# Patient Record
Sex: FEMALE | Race: WHITE | NOT HISPANIC OR LATINO | Employment: FULL TIME | URBAN - METROPOLITAN AREA
[De-identification: names, ages, dates, MRNs, and addresses within clinical notes are randomized per-mention and may not be internally consistent; named-entity substitution may affect disease eponyms.]

---

## 2020-03-06 ENCOUNTER — TELEPHONE (OUTPATIENT)
Dept: SURGERY | Facility: CLINIC | Age: 40
End: 2020-03-06

## 2020-03-06 NOTE — TELEPHONE ENCOUNTER
"New Pt with   3-27-20 arrive 7:30    Pre screened for possible Breast Mri  Good on all questions.  Ht: 5'3\"        Mailed pw    Pt v/u  Salud  "

## 2020-03-09 ENCOUNTER — TELEPHONE (OUTPATIENT)
Dept: SURGERY | Facility: CLINIC | Age: 40
End: 2020-03-09

## 2020-03-09 DIAGNOSIS — C50.212 MALIGNANT NEOPLASM OF UPPER-INNER QUADRANT OF LEFT FEMALE BREAST, UNSPECIFIED ESTROGEN RECEPTOR STATUS (HCC): Primary | ICD-10-CM

## 2020-03-09 NOTE — TELEPHONE ENCOUNTER
Scheduled Bilateral Breast Mri w wo contrast [facility] 3-17-20 arrive 6:15am    Spoke to pt she v/u    Salud

## 2020-03-13 ENCOUNTER — LAB REQUISITION (OUTPATIENT)
Dept: LAB | Facility: HOSPITAL | Age: 40
End: 2020-03-13

## 2020-03-13 DIAGNOSIS — Z00.00 ENCOUNTER FOR GENERAL ADULT MEDICAL EXAMINATION WITHOUT ABNORMAL FINDINGS: ICD-10-CM

## 2020-03-13 LAB
CYTO UR: NORMAL
LAB AP CASE REPORT: NORMAL
LAB AP DIAGNOSIS COMMENT: NORMAL
LAB AP SPECIAL STAINS: NORMAL
PATH REPORT.FINAL DX SPEC: NORMAL
PATH REPORT.GROSS SPEC: NORMAL

## 2020-03-13 PROCEDURE — 88360 TUMOR IMMUNOHISTOCHEM/MANUAL: CPT | Performed by: SURGERY

## 2020-03-17 ENCOUNTER — APPOINTMENT (OUTPATIENT)
Dept: MRI IMAGING | Facility: HOSPITAL | Age: 40
End: 2020-03-17

## 2020-03-18 ENCOUNTER — HOSPITAL ENCOUNTER (OUTPATIENT)
Dept: MRI IMAGING | Facility: HOSPITAL | Age: 40
Discharge: HOME | End: 2020-03-18
Admitting: SURGERY

## 2020-03-18 DIAGNOSIS — C50.212 MALIGNANT NEOPLASM OF UPPER-INNER QUADRANT OF LEFT FEMALE BREAST, UNSPECIFIED ESTROGEN RECEPTOR STATUS (HCC): ICD-10-CM

## 2020-03-18 PROCEDURE — 0 GADOBENATE DIMEGLUMINE 529 MG/ML SOLUTION: Performed by: SURGERY

## 2020-03-18 PROCEDURE — 77049 MRI BREAST C-+ W/CAD BI: CPT

## 2020-03-18 PROCEDURE — A9577 INJ MULTIHANCE: HCPCS | Performed by: SURGERY

## 2020-03-18 RX ADMIN — GADOBENATE DIMEGLUMINE 12 ML: 529 INJECTION, SOLUTION INTRAVENOUS at 19:11

## 2020-03-23 ENCOUNTER — TELEPHONE (OUTPATIENT)
Dept: SURGERY | Facility: CLINIC | Age: 40
End: 2020-03-23

## 2020-03-23 ENCOUNTER — OFFICE VISIT (OUTPATIENT)
Dept: SURGERY | Facility: CLINIC | Age: 40
End: 2020-03-23

## 2020-03-23 VITALS
TEMPERATURE: 98.6 F | BODY MASS INDEX: 31.36 KG/M2 | HEART RATE: 69 BPM | DIASTOLIC BLOOD PRESSURE: 72 MMHG | OXYGEN SATURATION: 97 % | SYSTOLIC BLOOD PRESSURE: 118 MMHG | HEIGHT: 63 IN | WEIGHT: 177 LBS

## 2020-03-23 DIAGNOSIS — Z80.3 FH: BREAST CANCER: ICD-10-CM

## 2020-03-23 DIAGNOSIS — N63.0 LUMP OR MASS IN BREAST: ICD-10-CM

## 2020-03-23 DIAGNOSIS — Z87.440 HISTORY OF FREQUENT URINARY TRACT INFECTIONS: ICD-10-CM

## 2020-03-23 DIAGNOSIS — C50.212 MALIGNANT NEOPLASM OF UPPER-INNER QUADRANT OF LEFT BREAST IN FEMALE, ESTROGEN RECEPTOR POSITIVE (HCC): Primary | ICD-10-CM

## 2020-03-23 DIAGNOSIS — Z17.0 MALIGNANT NEOPLASM OF UPPER-INNER QUADRANT OF LEFT BREAST IN FEMALE, ESTROGEN RECEPTOR POSITIVE (HCC): Primary | ICD-10-CM

## 2020-03-23 PROCEDURE — 99244 OFF/OP CNSLTJ NEW/EST MOD 40: CPT | Performed by: SURGERY

## 2020-03-23 RX ORDER — FERROUS SULFATE 325(65) MG
1 TABLET ORAL DAILY
COMMUNITY
Start: 2020-03-12 | End: 2020-06-01

## 2020-03-23 RX ORDER — LANOLIN ALCOHOL/MO/W.PET/CERES
1000 CREAM (GRAM) TOPICAL DAILY
COMMUNITY
End: 2020-06-01

## 2020-03-23 RX ORDER — SERTRALINE HYDROCHLORIDE 100 MG/1
200 TABLET, FILM COATED ORAL EVERY EVENING
COMMUNITY
Start: 2020-03-16 | End: 2021-06-28

## 2020-03-23 RX ORDER — DIAZEPAM 5 MG/1
TABLET ORAL
COMMUNITY
Start: 2020-03-10 | End: 2020-04-01

## 2020-03-23 NOTE — PROGRESS NOTES
Chief Complaint: Stacy Grewal is a 40 y.o. female who was seen in consultation at the request of Brenda Sheikh MD  for newly diagnosed breast cancer    History of Present Illness:  Patient presents with newly diagnosed breast cancer, LEFT breast.  She noted a left breast mass in January.  She does not feel that this is gotten any larger.  She noticed no associated pain with this.  No associated skin changes.  She noticed it first when looking in the mirror at the gym.      She noted no other new masses, skin changes, nipple discharge, nipple changes prior to her most recent imaging.  Her most recent imaging includes the followin2020  [facility]   Radiology  Stacy Grewal  SONOGRAM LEFT LTD  MAMM DX BILAT  Clinical history: Left breast mass, upper outer quadrant.   Comparison: None, baseline exam.   Breasts are composed of scattered fibroglandular tissue. 3 cm high density speculated mass 11-12:00 left breast middle one third. Associated pleomorphic microcalcifications and prominent architectural distortion. Corresponds to the palpable abnormality. Right breast is unremarkable.   Left Breast US: 3.2 x 2.3 x 1.9 cm mass 11-12:00. Recommend ultrasound-guided core needle biopsy.  BIRADS: 5      She had a biopsy on the following day that showed:     2020  Stella Grewal  US/BX BREAST LEFT  MAMM EDX UNILATERAL LEFT  Left breast. 4 core needle biopsy specimen 9 gauge device. A marker clip was then deployed within the biopsy bed.  Diagnostic Left Mammo: Postprocedure diagnostic left craniocaudal view confirms marker clip placement within the biopsied mass.    2020  [facility]   Radiology  Stacy Grewal  Solid architecture, high nuclear grade, and infrequent mitotic figures.  Invasive ductal carcinoma, grade 2.    2020  [facility]   Pathology  Stacy Grewal  OUTSIDE PATH CONSULT  Left, Core Biopsy:  A. Invasive mammary carcinoma, no special type,  grade II/III (tubules =3, nuclei =2, mitoses =2) 14 mm.  B. ER - 60%; OH - 10%; HER2 (Score 1+)  C. Ki-67 - 20%  D. No lymphvascular invasion.  E. No in situ component identified.        We then arranged for a breast MRI:    03/18/2020  [facility]  Radiololgy  Stacy Grewal  BILATERAL BREAST MRI  Middle third left breast 11:00 upper inner quadrant there is an irregular mass 4.4 cm in anterior-posterior, 3.7 cm in the superior-inferior 3.1 cm in the medial-lateral. A metallic clip is located along the anterolateral margin of the mass.  No evidence for left axillary or internal mammary chain adenopathy.  No findings suspicious for malignancy in the right breast.      She has not had a breast biopsy in the past.  She has her uterus and ovaries, is premenopausal, and takes no hormones.  Her family history includes the following: She has 1 daughter, 5 sisters, 4 maternal aunts, one paternal aunt, she has a paternal cousin who had breast cancer in her mid 30s, she has a paternal great aunt who had breast cancer.    She is here for evaluation.    Review of Systems:  Review of Systems   Constitutional: Positive for diaphoresis, fatigue and unexpected weight change. Negative for appetite change, chills and fever.   HENT:   Negative for hearing loss, lump/mass, mouth sores, nosebleeds, sore throat, tinnitus, trouble swallowing and voice change.    Eyes: Negative for eye problems and icterus.   Respiratory: Negative for chest tightness, cough, hemoptysis, shortness of breath and wheezing.    Cardiovascular: Negative for chest pain, leg swelling and palpitations.   Gastrointestinal: Negative for abdominal distention, abdominal pain, blood in stool, constipation, diarrhea, nausea, rectal pain and vomiting.   Endocrine: Positive for hot flashes.   Genitourinary: Positive for frequency, nocturia and vaginal discharge. Negative for bladder incontinence, difficulty urinating, dyspareunia, dysuria, hematuria, menstrual  problem, pelvic pain and vaginal bleeding.    Musculoskeletal: Positive for back pain and myalgias. Negative for arthralgias, flank pain, gait problem, neck pain and neck stiffness.   Skin: Negative for itching, rash and wound.   Neurological: Negative for dizziness, extremity weakness, gait problem, headaches, light-headedness, numbness, seizures and speech difficulty.   Hematological: Negative for adenopathy. Does not bruise/bleed easily.   Psychiatric/Behavioral: Positive for decreased concentration, depression and sleep disturbance. Negative for confusion and suicidal ideas. The patient is nervous/anxious.         Past Medical and Surgical History:  Breast Biopsy History:  Patient had not had a breast biopsy prior to her cancer diagnosis.  Breast Cancer HIstory:  Patient does not have a past medical history of breast cancer.  Breast Operations, and year:  None   Obstetric/Gynecologic History:  Age menstrual periods began: 12  Patient is premenopausal, first day of last period: approx 3/23/2020  Number of pregnancies: 2  Number of live births: 2  Number of abortions or miscarriages: 0  Age of delivery of first child: 22  Patient did not breast feed.  Length of time taking birth control pills: 6-8 yrs   Patient has never taken hormone replacement  Patient has both ovaries and uterus.     Past Surgical History:   Procedure Laterality Date   • APPENDECTOMY     • BREAST BIOPSY       Past Medical History:   Diagnosis Date   • Anemia    • Anxiety    • Depression    • History of UTI        Prior Hospitalizations, other than for surgery or childbirth, and year:  None     Social History     Socioeconomic History   • Marital status:      Spouse name: Not on file   • Number of children: Not on file   • Years of education: Not on file   • Highest education level: Not on file   Tobacco Use   • Smoking status: Never Smoker   • Smokeless tobacco: Never Used   Substance and Sexual Activity   • Alcohol use: Yes      "Comment: RARE   • Drug use: Never     Patient is .  Patient is employed full time with the following occupation:    Patient drinks 1-2 servings of caffeine per day.    Family History:  Family History   Problem Relation Age of Onset   • Breast cancer Paternal Aunt         GREAT PATERNAL AUNT    • Breast cancer Cousin 35        PATERNAL COUSIN        Vital Signs:  /72   Pulse 69   Temp 98.6 °F (37 °C) (Temporal)   Ht 160 cm (63\")   Wt 80.3 kg (177 lb)   LMP 03/23/2020 (Approximate)   SpO2 97%   Breastfeeding No   BMI 31.35 kg/m²      Medications:    Current Outpatient Medications:   •  ferrous sulfate 325 (65 FE) MG tablet, Take 1 tablet by mouth Daily., Disp: , Rfl:   •  sertraline (ZOLOFT) 100 MG tablet, Take 200 mg by mouth Daily., Disp: , Rfl:   •  vitamin B-12 (CYANOCOBALAMIN) 1000 MCG tablet, Take 1,000 mcg by mouth Daily., Disp: , Rfl:   •  diazePAM (VALIUM) 5 MG tablet, TAKE 1 TABLAET 1 HOUR BEFORE APPOINTMENT AND MAY REPEAT ONCE, Disp: , Rfl:      Allergies:  No Known Allergies    Physical Examination:  /72   Pulse 69   Temp 98.6 °F (37 °C) (Temporal)   Ht 160 cm (63\")   Wt 80.3 kg (177 lb)   LMP 03/23/2020 (Approximate)   SpO2 97%   Breastfeeding No   BMI 31.35 kg/m²   General Appearance:  Patient is in no distress.  She is well kept and has an obese build.   Psychiatric:  Patient with appropriate mood and affect. Alert and oriented to self, time, and place.    Breast, RIGHT:  large sized,44DD,  symmetric with the contralateral side.  Breast skin is without erythema, edema, rashes.  There are no visible abnormalities upon inspection during the arm-raising maneuver or with hands on hips in the sitting position. There is no nipple retraction, discharge or nipple/areolar skin changes.There are no masses palpable in the sitting or supine positions.    Breast, LEFT:  large sized, 44DD, symmetric with the contralateral side.  Breast skin is without erythema, edema, " rashes.  There are no visible abnormalities upon inspection during the arm-raising maneuver or with hands on hips in the sitting position. There is no nipple retraction, discharge or nipple/areolar skin changes.there is a 4 x 3.5 cm mass palpable at the left breast 1130, 9.5 cm from the nipple location of known malignancy.  There are no overlying skin changes.  It is nontender.  There are no other masses palpable in the sitting or supine positions.    Lymphatic:  There is no axillary, cervical, infraclavicular, or supraclavicular adenopathy bilaterally.  Eyes:  Pupils are round and reactive to light.  Cardiovascular:  Heart rate and rhythm are regular.  Respiratory:  Lungs are clear bilaterally with no crackles or wheezes in any lung field.  Gastrointestinal:  Abdomen is soft, nondistended, and nontender.     Musculoskeletal:  Good strength in all 4 extremities.   There is good range of motion in both shoulders.    Skin:  No new skin lesions or rashes on the skin excluding the breast (see breast exam above).        Imagin2020  [facility]   Radiology  [facility]  SONOGRAM LEFT LTD  MAMM DX BILAT  Clinical history: Left breast mass, upper outer quadrant.   Comparison: None, baseline exam.   Breasts are composed of scattered fibroglandular tissue. 3 cm high density speculated mass 11-12:00 left breast middle one third. Associated pleomorphic microcalcifications and prominent architectural distortion. Corresponds to the palpable abnormality. Right breast is unremarkable.   Left Breast US: 3.2 x 2.3 x 1.9 cm mass 11-12:00. Recommend ultrasound-guided core needle biopsy.  BIRADS: 5    2020  [facility]  Radiololgy  Stacy Hobgood  BILATERAL BREAST MRI  Middle third left breast 11:00 upper inner quadrant there is an irregular mass 4.4 cm in anterior-posterior, 3.7 cm in the superior-inferior 3.1 cm in the medial-lateral. A metallic clip is located along the anterolateral margin of the  mass.  No evidence for left axillary or internal mammary chain adenopathy.  No findings suspicious for malignancy in the right breast.    Pathology:    03/04/2020  [facility]   Radiology  Stacy Grewal  US/BX BREAST LEFT  MAMM EDX UNILATERAL LEFT  Left breast. 4 core needle biopsy specimen 9 gauge device. A marker clip was then deployed within the biopsy bed.  Diagnostic Left Mammo: Postprocedure diagnostic left craniocaudal view confirms marker clip placement within the biopsied mass.    03/04/2020  [facility]   Radiology  Stacy Grewal  Solid architecture, high nuclear grade, and infrequent mitotic figures.  Invasive ductal carcinoma, grade 2.    03/13/2020  [facility]   Pathology  Stacy Grewal  OUTSIDE PATH CONSULT  Left, Core Biopsy:  F. Invasive mammary carcinoma, no special type, grade II/III (tubules =3, nuclei =2, mitoses =2) 14 mm.  G. ER - 60%; NC - 10%; HER2 (Score 1+)  H. Ki-67 - 20%  I. No lymphvascular invasion.  J. No in situ component identified.    Procedures:      Assessment:   Diagnosis Plan   1. Malignant neoplasm of upper-inner quadrant of left breast in female, estrogen receptor positive (CMS/HCC)     2. Lump or mass in breast     3. FH: breast cancer     4. History of frequent urinary tract infections     1-2  Left breast 1130, 9.5 cm from the nipple, 4 cm on exam, 4.4 cm on MRI, 3.2 cm on ultrasound.  Invasive mammary carcinoma, no special type, intermediate grade, 3, 2, 2, 1.4 cm largest in a core, estrogen 60, progesterone 10, HER-2/mary 1+, Ki-67 is 20%.  No lymphovascular invasion, no duct carcinoma site 2,  Clinical stage T2N0 stage IIa.    3-  Paternal cousin age 35, paternal great aunt uncertain age    4-  Postcoital UTIs    Plan:  Stacy is here with her cousin today.    We reviewed her history, exam, imaging, pathology, and treatment options together today. We reviewed the differences in systemic therpay versus locoregional therapy. We discussed the options of  neoadjuvant versus adjuvant therapy. We discussed that for larger tumors, node positive tumors, or tumors that are felt to communly be chemosensitive, that neoadjuvant therapy can offer the following benefits: potential tumor reduction that may allow for breast conservation, ability to see in vivo response to chemotherapy, potential in 30% of women to neutralize small volume wendy disease that may allow avoidance of complete axillary dissection.      Specifically for her, the location in the upper inner quadrant and the sheer size, she would benefit from neoadjuvant therapy just to shrink the tumor.  The possibility of neutralizing microscopic wendy disease would also be of benefit.  I spoke with Dr. Gregory regarding her case today.    She is interested in neoadjuvant therapy.    Since this is hormone fed and not fed by HER-2/mary, we will plan to send an Oncotype off, to help her decide between a shorter or longer duration of neoadjuvant therapy.  She will let me know whether Stacy needs a port placed.  Stacy and I discussed her having a left breast ultrasound here at [facility] prior to seeing me in 8 weeks.  We also discussed with her family history and young age at diagnosis that I would recommend we send off a genetic panel today.  Breast and gynecologic panel with [facility].    We briefly discussed breast conservation versus total mastectomy in general terms. After her followup MRI, we will discuss the surgical options as well as radiation.     The plan would be eventually for a left breast image guided lumpectomy, left axillary sentinel lymph node biopsy, possible axillary lymph node dissection and oncoplastic closure bilaterally.    At her next visit, we will schedule her to see plastics and her FU MRI.    -- addendum- I received a call from Dr Didi Chew, partner of Compa Leija, at [facility], He plans for ddAC-T. He would like a port.    We will plan for a RIGHT port  placement.    She and I will discuss risks of bleeding, infection, pneumothorax, thrombus, malfunction prior to her case early next week.    He said not to cancel the oncotype.        Sunni Barber MD        We have spent 60 minutes in visit today, 45 in face to face .      Next Appointment:  Return in about 8 weeks (around 5/18/2020) for after imaging.      EMR Dragon/transcription disclaimer:    Much of this encounter note is an electronic transcription/translocation of spoken language to printed text.  The electronic translation of spoken language may permit erroneous, or at times, nonsensical words or phrases to be inadvertently transcribed.  Although I have reviewed the note from such areas, some may still exist.

## 2020-03-27 ENCOUNTER — TELEPHONE (OUTPATIENT)
Dept: SURGERY | Facility: CLINIC | Age: 40
End: 2020-03-27

## 2020-03-27 ENCOUNTER — TRANSCRIBE ORDERS (OUTPATIENT)
Dept: SURGERY | Facility: CLINIC | Age: 40
End: 2020-03-27

## 2020-03-27 DIAGNOSIS — C50.212 MALIGNANT NEOPLASM OF UPPER-INNER QUADRANT OF LEFT FEMALE BREAST, UNSPECIFIED ESTROGEN RECEPTOR STATUS (HCC): Primary | ICD-10-CM

## 2020-03-27 NOTE — TELEPHONE ENCOUNTER
Scheduled Left Breast U/S at [facility] 5-18-20 @ 11:30  Return to see Dr ORTIZ same day at 1:30          Salud

## 2020-03-30 ENCOUNTER — TELEPHONE (OUTPATIENT)
Dept: SURGERY | Facility: CLINIC | Age: 40
End: 2020-03-30

## 2020-03-30 ENCOUNTER — TELEPHONE (OUTPATIENT)
Dept: OTHER | Facility: HOSPITAL | Age: 40
End: 2020-03-30

## 2020-03-30 NOTE — TELEPHONE ENCOUNTER
Referral received from Dr. Barber's office. I called Ms. Grewal and introduced myself and navigational services. She states the plan is to have chemotherapy first and she will do that at [facility]. She has a good understanding of her pathology and treatment options and  is comfortable with her plan of care.     We discussed her support system and she stated her support is ok, but she has a therapist she talks to twice a month. We discussed that we have counseling services available through our [facility]. She declined the need for that at present.    We discussed integrative therapies and other services at the [facility]. She verbalized interest in receiving a navigation folder outlining services. I verified her mailing address and will send out a navigation folder with the following information:     [facility] Support [facility],  Sharing Our Stories Breast Cancer Support Group, Cancer and Restorative Exercise (CARE), [employer] Exercise program, Together for Breast Cancer Survival,  For Women Facing Breast Cancer, Road to Recovery, Bio[employer]ped[facility], [facility], Massage Therapy, Reiki Therapy, [employer] [place], Cancer Nutrition, and Survivorship Clinic.     She verbalized appreciation for navigational services and she has my contact information and will call with any questions that arise.

## 2020-03-31 ENCOUNTER — PREP FOR SURGERY (OUTPATIENT)
Dept: OTHER | Facility: HOSPITAL | Age: 40
End: 2020-03-31

## 2020-03-31 ENCOUNTER — TELEPHONE (OUTPATIENT)
Dept: SURGERY | Facility: CLINIC | Age: 40
End: 2020-03-31

## 2020-03-31 DIAGNOSIS — C50.212 MALIGNANT NEOPLASM OF UPPER-INNER QUADRANT OF LEFT FEMALE BREAST, UNSPECIFIED ESTROGEN RECEPTOR STATUS (HCC): Primary | ICD-10-CM

## 2020-03-31 DIAGNOSIS — Z17.0 CARCINOMA OF UPPER-INNER QUADRANT OF LEFT BREAST IN FEMALE, ESTROGEN RECEPTOR POSITIVE (HCC): Primary | ICD-10-CM

## 2020-03-31 DIAGNOSIS — C50.212 CARCINOMA OF UPPER-INNER QUADRANT OF LEFT BREAST IN FEMALE, ESTROGEN RECEPTOR POSITIVE (HCC): Primary | ICD-10-CM

## 2020-03-31 RX ORDER — ONDANSETRON 4 MG/1
4 TABLET, FILM COATED ORAL EVERY 8 HOURS PRN
Qty: 10 TABLET | Refills: 1 | Status: SHIPPED | OUTPATIENT
Start: 2020-03-31 | End: 2020-04-01

## 2020-03-31 RX ORDER — SODIUM CHLORIDE, SODIUM LACTATE, POTASSIUM CHLORIDE, CALCIUM CHLORIDE 600; 310; 30; 20 MG/100ML; MG/100ML; MG/100ML; MG/100ML
100 INJECTION, SOLUTION INTRAVENOUS CONTINUOUS
Status: CANCELLED | OUTPATIENT
Start: 2020-04-07

## 2020-03-31 RX ORDER — CEFAZOLIN SODIUM 2 G/100ML
2 INJECTION, SOLUTION INTRAVENOUS ONCE
Status: CANCELLED | OUTPATIENT
Start: 2020-04-07 | End: 2020-03-31

## 2020-03-31 RX ORDER — POLYETHYLENE GLYCOL 3350 17 G/17G
17 POWDER, FOR SOLUTION ORAL DAILY
Qty: 1 EACH | Refills: 0 | Status: SHIPPED | OUTPATIENT
Start: 2020-03-31 | End: 2020-04-01

## 2020-03-31 RX ORDER — HYDROCODONE BITARTRATE AND ACETAMINOPHEN 5; 325 MG/1; MG/1
TABLET ORAL
Qty: 15 TABLET | Refills: 0 | Status: SHIPPED | OUTPATIENT
Start: 2020-03-31 | End: 2020-04-01

## 2020-03-31 RX ORDER — CALCIUM POLYCARBOPHIL 625 MG 625 MG/1
625 TABLET ORAL DAILY
Qty: 30 TABLET | Refills: 0 | Status: SHIPPED | OUTPATIENT
Start: 2020-03-31 | End: 2020-04-01

## 2020-04-01 ENCOUNTER — APPOINTMENT (OUTPATIENT)
Dept: PREADMISSION TESTING | Facility: HOSPITAL | Age: 40
End: 2020-04-01

## 2020-04-01 ENCOUNTER — TELEPHONE (OUTPATIENT)
Dept: SURGERY | Facility: CLINIC | Age: 40
End: 2020-04-01

## 2020-04-01 VITALS
HEIGHT: 63 IN | TEMPERATURE: 98.1 F | WEIGHT: 172 LBS | HEART RATE: 82 BPM | DIASTOLIC BLOOD PRESSURE: 71 MMHG | BODY MASS INDEX: 30.48 KG/M2 | OXYGEN SATURATION: 98 % | SYSTOLIC BLOOD PRESSURE: 106 MMHG | RESPIRATION RATE: 16 BRPM

## 2020-04-01 LAB
ANION GAP SERPL CALCULATED.3IONS-SCNC: 14.2 MMOL/L (ref 5–15)
BUN BLD-MCNC: 6 MG/DL (ref 6–20)
BUN/CREAT SERPL: 8.3 (ref 7–25)
CALCIUM SPEC-SCNC: 9.4 MG/DL (ref 8.6–10.5)
CHLORIDE SERPL-SCNC: 98 MMOL/L (ref 98–107)
CO2 SERPL-SCNC: 25.8 MMOL/L (ref 22–29)
CREAT BLD-MCNC: 0.72 MG/DL (ref 0.57–1)
DEPRECATED RDW RBC AUTO: 43.9 FL (ref 37–54)
ERYTHROCYTE [DISTWIDTH] IN BLOOD BY AUTOMATED COUNT: 14.2 % (ref 12.3–15.4)
GFR SERPL CREATININE-BSD FRML MDRD: 91 ML/MIN/1.73
GLUCOSE BLD-MCNC: 83 MG/DL (ref 65–99)
HCG SERPL QL: NEGATIVE
HCT VFR BLD AUTO: 40.1 % (ref 34–46.6)
HGB BLD-MCNC: 13 G/DL (ref 12–15.9)
MCH RBC QN AUTO: 27.3 PG (ref 26.6–33)
MCHC RBC AUTO-ENTMCNC: 32.4 G/DL (ref 31.5–35.7)
MCV RBC AUTO: 84.1 FL (ref 79–97)
PLATELET # BLD AUTO: 248 10*3/MM3 (ref 140–450)
PMV BLD AUTO: 9.2 FL (ref 6–12)
POTASSIUM BLD-SCNC: 4 MMOL/L (ref 3.5–5.2)
RBC # BLD AUTO: 4.77 10*6/MM3 (ref 3.77–5.28)
SODIUM BLD-SCNC: 138 MMOL/L (ref 136–145)
WBC NRBC COR # BLD: 6.61 10*3/MM3 (ref 3.4–10.8)

## 2020-04-01 PROCEDURE — 36415 COLL VENOUS BLD VENIPUNCTURE: CPT

## 2020-04-01 PROCEDURE — 84703 CHORIONIC GONADOTROPIN ASSAY: CPT | Performed by: SURGERY

## 2020-04-01 PROCEDURE — 80048 BASIC METABOLIC PNL TOTAL CA: CPT | Performed by: SURGERY

## 2020-04-01 PROCEDURE — 85027 COMPLETE CBC AUTOMATED: CPT | Performed by: SURGERY

## 2020-04-01 RX ORDER — MELATONIN 10 MG
1 CAPSULE ORAL NIGHTLY
COMMUNITY
End: 2020-06-01

## 2020-04-01 NOTE — DISCHARGE INSTRUCTIONS
Take the following medications the morning of surgery:NONE    SURGERY 04/07 ARRIVAL 05:30    General Instructions:  • Do not eat solid food after midnight the night before surgery.  • You may drink clear liquids day of surgery but must stop at least one hour before your hospital arrival time.  • It is beneficial for you to have a clear drink that contains carbohydrates the day of surgery.  We suggest a 12 to 20 ounce bottle of Gatorade or Powerade for non-diabetic patients or a 12 to 20 ounce bottle of G2 or Powerade Zero for diabetic patients. (Pediatric patients, are not advised to drink a 12 to 20 ounce carbohydrate drink)    Clear liquids are liquids you can see through.  Nothing red in color.     Plain water                               Sports drinks  Sodas                                   Gelatin (Jell-O)  Fruit juices without pulp such as white grape juice and apple juice  Popsicles that contain no fruit or yogurt  Tea or coffee (no cream or milk added)  Gatorade / Powerade  G2 / Powerade Zero    • Infants may have breast milk up to four hours before surgery.  • Infants drinking formula may drink formula up to six hours before surgery.   • Patients who avoid smoking, chewing tobacco and alcohol for 4 weeks prior to surgery have a reduced risk of post-operative complications.  Quit smoking as many days before surgery as you can.  • Do not smoke, use chewing tobacco or drink alcohol the day of surgery.   • If applicable bring your C-PAP/ BI-PAP machine.  • Bring any papers given to you in the doctor’s office.  • Wear clean comfortable clothes.  • Do not wear contact lenses, false eyelashes or make-up.  Bring a case for your glasses.   • Bring crutches or walker if applicable.  • Remove all piercings.  Leave jewelry and any other valuables at home.  • Hair extensions with metal clips must be removed prior to surgery.  • The Pre-Admission Testing nurse will instruct you to bring medications if unable to obtain  an accurate list in Pre-Admission Testing.        If you were given a blood bank ID arm band remember to bring it with you the day of surgery.    Preventing a Surgical Site Infection:  • For 2 to 3 days before surgery, avoid shaving with a razor because the razor can irritate skin and make it easier to develop an infection.    • Any areas of open skin can increase the risk of a post-operative wound infection by allowing bacteria to enter and travel throughout the body.  Notify your surgeon if you have any skin wounds / rashes even if it is not near the expected surgical site.  The area will need assessed to determine if surgery should be delayed until it is healed.  • The night prior to surgery shower using a fresh bar of anti-bacterial soap (such as Dial) and clean washcloth.  Sleep in a clean bed with clean clothing.  Do not allow pets to sleep with you.  • Shower on the morning of surgery using a fresh bar of anti-bacterial soap (such as Dial) and clean washcloth.  Dry with a clean towel and dress in clean clothing.  • Ask your surgeon if you will be receiving antibiotics prior to surgery.  • Make sure you, your family, and all healthcare providers clean their hands with soap and water or an alcohol based hand  before caring for you or your wound.    Day of surgery:  Your arrival time is approximately two hours before your scheduled surgery time.  Upon arrival, a Pre-op nurse and Anesthesiologist will review your health history, obtain vital signs, and answer questions you may have.  The only belongings needed at this time will be a list of your home medications and if applicable your C-PAP/BI-PAP machine.  If you are staying overnight your family can leave the rest of your belongings in the car and bring them to your room later.  A Pre-op nurse will start an IV and you may receive medication in preparation for surgery, including something to help you relax.  Your family will be able to see you in the  Pre-op area.  Two visitors at a time will be allowed in the Pre-op room.  While you are in surgery your family should notify the waiting room  if they leave the waiting room area and provide a contact phone number.    Please be aware that surgery does come with discomfort.  We want to make every effort to control your discomfort so please discuss any uncontrolled symptoms with your nurse.   Your doctor will most likely have prescribed pain medications.      If you are going home after surgery you will receive individualized written care instructions before being discharged.  A responsible adult must drive you to and from the hospital on the day of your surgery and stay with you for 24 hours.    If you are staying overnight following surgery, you will be transported to your hospital room following the recovery period.  [facility] has all private rooms.    If you have any questions please call Pre-Admission Testing at [phone].  Deductibles and co-payments are collected on the day of service. Please be prepared to pay the required co-pay, deductible or deposit on the day of service as defined by your plan.

## 2020-04-07 ENCOUNTER — ANESTHESIA (OUTPATIENT)
Dept: PERIOP | Facility: HOSPITAL | Age: 40
End: 2020-04-07

## 2020-04-07 ENCOUNTER — ANESTHESIA EVENT (OUTPATIENT)
Dept: PERIOP | Facility: HOSPITAL | Age: 40
End: 2020-04-07

## 2020-04-07 ENCOUNTER — HOSPITAL ENCOUNTER (OUTPATIENT)
Facility: HOSPITAL | Age: 40
Setting detail: HOSPITAL OUTPATIENT SURGERY
Discharge: HOME | End: 2020-04-07
Attending: SURGERY | Admitting: SURGERY

## 2020-04-07 ENCOUNTER — APPOINTMENT (OUTPATIENT)
Dept: GENERAL RADIOLOGY | Facility: HOSPITAL | Age: 40
End: 2020-04-07

## 2020-04-07 VITALS
WEIGHT: 175.19 LBS | OXYGEN SATURATION: 93 % | HEIGHT: 63 IN | TEMPERATURE: 98.3 F | DIASTOLIC BLOOD PRESSURE: 61 MMHG | RESPIRATION RATE: 16 BRPM | HEART RATE: 68 BPM | SYSTOLIC BLOOD PRESSURE: 108 MMHG | BODY MASS INDEX: 31.04 KG/M2

## 2020-04-07 DIAGNOSIS — C50.212 CARCINOMA OF UPPER-INNER QUADRANT OF LEFT BREAST IN FEMALE, ESTROGEN RECEPTOR POSITIVE (HCC): ICD-10-CM

## 2020-04-07 DIAGNOSIS — Z17.0 CARCINOMA OF UPPER-INNER QUADRANT OF LEFT BREAST IN FEMALE, ESTROGEN RECEPTOR POSITIVE (HCC): ICD-10-CM

## 2020-04-07 PROCEDURE — 25010000002 SUCCINYLCHOLINE PER 20 MG: Performed by: NURSE ANESTHETIST, CERTIFIED REGISTERED

## 2020-04-07 PROCEDURE — 25010000002 FENTANYL CITRATE (PF) 100 MCG/2ML SOLUTION: Performed by: NURSE ANESTHETIST, CERTIFIED REGISTERED

## 2020-04-07 PROCEDURE — 25010000002 PROPOFOL 10 MG/ML EMULSION: Performed by: NURSE ANESTHETIST, CERTIFIED REGISTERED

## 2020-04-07 PROCEDURE — 25010000003 LIDOCAINE 1 % SOLUTION 20 ML VIAL: Performed by: SURGERY

## 2020-04-07 PROCEDURE — 77001 FLUOROGUIDE FOR VEIN DEVICE: CPT | Performed by: SURGERY

## 2020-04-07 PROCEDURE — C1788 PORT, INDWELLING, IMP: HCPCS | Performed by: SURGERY

## 2020-04-07 PROCEDURE — 36561 INSERT TUNNELED CV CATH: CPT | Performed by: SURGERY

## 2020-04-07 PROCEDURE — 25010000002 ONDANSETRON PER 1 MG: Performed by: NURSE ANESTHETIST, CERTIFIED REGISTERED

## 2020-04-07 PROCEDURE — 25010000002 DEXAMETHASONE PER 1 MG: Performed by: NURSE ANESTHETIST, CERTIFIED REGISTERED

## 2020-04-07 PROCEDURE — 25010000002 HEPARIN (PORCINE) PER 1000 UNITS: Performed by: SURGERY

## 2020-04-07 PROCEDURE — 25010000002 HYDROMORPHONE PER 4 MG: Performed by: NURSE ANESTHETIST, CERTIFIED REGISTERED

## 2020-04-07 PROCEDURE — 25010000003 CEFAZOLIN IN DEXTROSE 2-4 GM/100ML-% SOLUTION: Performed by: SURGERY

## 2020-04-07 PROCEDURE — 76000 FLUOROSCOPY <1 HR PHYS/QHP: CPT

## 2020-04-07 PROCEDURE — 25010000002 MIDAZOLAM PER 1 MG: Performed by: ANESTHESIOLOGY

## 2020-04-07 DEVICE — POWERPORT M.R.I. IMPLANTABLE PORT WITH ATTACHABLE 8F CHRONOFLEX OPEN-ENDED SINGLE-LUMEN VENOUS CATHETER INTERMEDIATE KIT (WITH SUTURE PLUGS)
Type: IMPLANTABLE DEVICE | Site: SUBCLAVIAN | Status: FUNCTIONAL
Brand: POWERPORT M.R.I., CHRONOFLEX

## 2020-04-07 RX ORDER — MIDAZOLAM HYDROCHLORIDE 1 MG/ML
1 INJECTION INTRAMUSCULAR; INTRAVENOUS
Status: DISCONTINUED | OUTPATIENT
Start: 2020-04-07 | End: 2020-04-07 | Stop reason: HOSPADM

## 2020-04-07 RX ORDER — PROPOFOL 10 MG/ML
VIAL (ML) INTRAVENOUS AS NEEDED
Status: DISCONTINUED | OUTPATIENT
Start: 2020-04-07 | End: 2020-04-07 | Stop reason: SURG

## 2020-04-07 RX ORDER — ONDANSETRON 2 MG/ML
4 INJECTION INTRAMUSCULAR; INTRAVENOUS ONCE AS NEEDED
Status: DISCONTINUED | OUTPATIENT
Start: 2020-04-07 | End: 2020-04-07 | Stop reason: HOSPADM

## 2020-04-07 RX ORDER — OXYCODONE AND ACETAMINOPHEN 7.5; 325 MG/1; MG/1
1 TABLET ORAL ONCE AS NEEDED
Status: DISCONTINUED | OUTPATIENT
Start: 2020-04-07 | End: 2020-04-07 | Stop reason: HOSPADM

## 2020-04-07 RX ORDER — DEXAMETHASONE SODIUM PHOSPHATE 10 MG/ML
INJECTION INTRAMUSCULAR; INTRAVENOUS AS NEEDED
Status: DISCONTINUED | OUTPATIENT
Start: 2020-04-07 | End: 2020-04-07 | Stop reason: SURG

## 2020-04-07 RX ORDER — LIDOCAINE HYDROCHLORIDE 20 MG/ML
INJECTION, SOLUTION INFILTRATION; PERINEURAL AS NEEDED
Status: DISCONTINUED | OUTPATIENT
Start: 2020-04-07 | End: 2020-04-07 | Stop reason: SURG

## 2020-04-07 RX ORDER — PROMETHAZINE HYDROCHLORIDE 25 MG/1
25 SUPPOSITORY RECTAL ONCE AS NEEDED
Status: DISCONTINUED | OUTPATIENT
Start: 2020-04-07 | End: 2020-04-07 | Stop reason: HOSPADM

## 2020-04-07 RX ORDER — HYDRALAZINE HYDROCHLORIDE 20 MG/ML
5 INJECTION INTRAMUSCULAR; INTRAVENOUS
Status: DISCONTINUED | OUTPATIENT
Start: 2020-04-07 | End: 2020-04-07 | Stop reason: HOSPADM

## 2020-04-07 RX ORDER — FAMOTIDINE 10 MG/ML
20 INJECTION, SOLUTION INTRAVENOUS ONCE
Status: COMPLETED | OUTPATIENT
Start: 2020-04-07 | End: 2020-04-07

## 2020-04-07 RX ORDER — PROMETHAZINE HYDROCHLORIDE 25 MG/ML
6.25 INJECTION, SOLUTION INTRAMUSCULAR; INTRAVENOUS
Status: DISCONTINUED | OUTPATIENT
Start: 2020-04-07 | End: 2020-04-07 | Stop reason: HOSPADM

## 2020-04-07 RX ORDER — ONDANSETRON 2 MG/ML
INJECTION INTRAMUSCULAR; INTRAVENOUS AS NEEDED
Status: DISCONTINUED | OUTPATIENT
Start: 2020-04-07 | End: 2020-04-07 | Stop reason: SURG

## 2020-04-07 RX ORDER — HYDROMORPHONE HYDROCHLORIDE 1 MG/ML
0.5 INJECTION, SOLUTION INTRAMUSCULAR; INTRAVENOUS; SUBCUTANEOUS
Status: DISCONTINUED | OUTPATIENT
Start: 2020-04-07 | End: 2020-04-07 | Stop reason: HOSPADM

## 2020-04-07 RX ORDER — SUCCINYLCHOLINE CHLORIDE 20 MG/ML
INJECTION INTRAMUSCULAR; INTRAVENOUS AS NEEDED
Status: DISCONTINUED | OUTPATIENT
Start: 2020-04-07 | End: 2020-04-07 | Stop reason: SURG

## 2020-04-07 RX ORDER — DIPHENHYDRAMINE HCL 25 MG
25 CAPSULE ORAL
Status: DISCONTINUED | OUTPATIENT
Start: 2020-04-07 | End: 2020-04-07 | Stop reason: HOSPADM

## 2020-04-07 RX ORDER — FENTANYL CITRATE 50 UG/ML
INJECTION, SOLUTION INTRAMUSCULAR; INTRAVENOUS AS NEEDED
Status: DISCONTINUED | OUTPATIENT
Start: 2020-04-07 | End: 2020-04-07 | Stop reason: SURG

## 2020-04-07 RX ORDER — CEFAZOLIN SODIUM 2 G/100ML
2 INJECTION, SOLUTION INTRAVENOUS ONCE
Status: COMPLETED | OUTPATIENT
Start: 2020-04-07 | End: 2020-04-07

## 2020-04-07 RX ORDER — EPHEDRINE SULFATE 50 MG/ML
INJECTION, SOLUTION INTRAVENOUS AS NEEDED
Status: DISCONTINUED | OUTPATIENT
Start: 2020-04-07 | End: 2020-04-07 | Stop reason: SURG

## 2020-04-07 RX ORDER — LIDOCAINE HYDROCHLORIDE 10 MG/ML
0.5 INJECTION, SOLUTION EPIDURAL; INFILTRATION; INTRACAUDAL; PERINEURAL ONCE AS NEEDED
Status: DISCONTINUED | OUTPATIENT
Start: 2020-04-07 | End: 2020-04-07 | Stop reason: HOSPADM

## 2020-04-07 RX ORDER — PROMETHAZINE HYDROCHLORIDE 25 MG/1
25 TABLET ORAL ONCE AS NEEDED
Status: DISCONTINUED | OUTPATIENT
Start: 2020-04-07 | End: 2020-04-07 | Stop reason: HOSPADM

## 2020-04-07 RX ORDER — FLUMAZENIL 0.1 MG/ML
0.2 INJECTION INTRAVENOUS AS NEEDED
Status: DISCONTINUED | OUTPATIENT
Start: 2020-04-07 | End: 2020-04-07 | Stop reason: HOSPADM

## 2020-04-07 RX ORDER — DIPHENHYDRAMINE HYDROCHLORIDE 50 MG/ML
12.5 INJECTION INTRAMUSCULAR; INTRAVENOUS
Status: DISCONTINUED | OUTPATIENT
Start: 2020-04-07 | End: 2020-04-07 | Stop reason: HOSPADM

## 2020-04-07 RX ORDER — LABETALOL HYDROCHLORIDE 5 MG/ML
5 INJECTION, SOLUTION INTRAVENOUS
Status: DISCONTINUED | OUTPATIENT
Start: 2020-04-07 | End: 2020-04-07 | Stop reason: HOSPADM

## 2020-04-07 RX ORDER — ACETAMINOPHEN 325 MG/1
650 TABLET ORAL ONCE AS NEEDED
Status: DISCONTINUED | OUTPATIENT
Start: 2020-04-07 | End: 2020-04-07 | Stop reason: HOSPADM

## 2020-04-07 RX ORDER — PROMETHAZINE HYDROCHLORIDE 25 MG/ML
12.5 INJECTION, SOLUTION INTRAMUSCULAR; INTRAVENOUS ONCE AS NEEDED
Status: DISCONTINUED | OUTPATIENT
Start: 2020-04-07 | End: 2020-04-07 | Stop reason: HOSPADM

## 2020-04-07 RX ORDER — NALOXONE HCL 0.4 MG/ML
0.2 VIAL (ML) INJECTION AS NEEDED
Status: DISCONTINUED | OUTPATIENT
Start: 2020-04-07 | End: 2020-04-07 | Stop reason: HOSPADM

## 2020-04-07 RX ORDER — SODIUM CHLORIDE 0.9 % (FLUSH) 0.9 %
3-10 SYRINGE (ML) INJECTION AS NEEDED
Status: DISCONTINUED | OUTPATIENT
Start: 2020-04-07 | End: 2020-04-07 | Stop reason: HOSPADM

## 2020-04-07 RX ORDER — EPHEDRINE SULFATE 50 MG/ML
5 INJECTION, SOLUTION INTRAVENOUS ONCE AS NEEDED
Status: DISCONTINUED | OUTPATIENT
Start: 2020-04-07 | End: 2020-04-07 | Stop reason: HOSPADM

## 2020-04-07 RX ORDER — HYDROCODONE BITARTRATE AND ACETAMINOPHEN 7.5; 325 MG/1; MG/1
1 TABLET ORAL ONCE AS NEEDED
Status: COMPLETED | OUTPATIENT
Start: 2020-04-07 | End: 2020-04-07

## 2020-04-07 RX ORDER — MIDAZOLAM HYDROCHLORIDE 1 MG/ML
2 INJECTION INTRAMUSCULAR; INTRAVENOUS
Status: DISCONTINUED | OUTPATIENT
Start: 2020-04-07 | End: 2020-04-07 | Stop reason: HOSPADM

## 2020-04-07 RX ORDER — ROCURONIUM BROMIDE 10 MG/ML
INJECTION, SOLUTION INTRAVENOUS AS NEEDED
Status: DISCONTINUED | OUTPATIENT
Start: 2020-04-07 | End: 2020-04-07 | Stop reason: SURG

## 2020-04-07 RX ORDER — FENTANYL CITRATE 50 UG/ML
50 INJECTION, SOLUTION INTRAMUSCULAR; INTRAVENOUS
Status: DISCONTINUED | OUTPATIENT
Start: 2020-04-07 | End: 2020-04-07 | Stop reason: HOSPADM

## 2020-04-07 RX ORDER — SODIUM CHLORIDE, SODIUM LACTATE, POTASSIUM CHLORIDE, CALCIUM CHLORIDE 600; 310; 30; 20 MG/100ML; MG/100ML; MG/100ML; MG/100ML
9 INJECTION, SOLUTION INTRAVENOUS CONTINUOUS
Status: DISCONTINUED | OUTPATIENT
Start: 2020-04-07 | End: 2020-04-07 | Stop reason: HOSPADM

## 2020-04-07 RX ORDER — SODIUM CHLORIDE 0.9 % (FLUSH) 0.9 %
3 SYRINGE (ML) INJECTION EVERY 12 HOURS SCHEDULED
Status: DISCONTINUED | OUTPATIENT
Start: 2020-04-07 | End: 2020-04-07 | Stop reason: HOSPADM

## 2020-04-07 RX ORDER — SODIUM CHLORIDE, SODIUM LACTATE, POTASSIUM CHLORIDE, CALCIUM CHLORIDE 600; 310; 30; 20 MG/100ML; MG/100ML; MG/100ML; MG/100ML
100 INJECTION, SOLUTION INTRAVENOUS CONTINUOUS
Status: DISCONTINUED | OUTPATIENT
Start: 2020-04-07 | End: 2020-04-07 | Stop reason: HOSPADM

## 2020-04-07 RX ADMIN — FENTANYL CITRATE 50 MCG: 50 INJECTION INTRAMUSCULAR; INTRAVENOUS at 08:11

## 2020-04-07 RX ADMIN — EPHEDRINE SULFATE 10 MG: 50 INJECTION INTRAVENOUS at 08:23

## 2020-04-07 RX ADMIN — SODIUM CHLORIDE, POTASSIUM CHLORIDE, SODIUM LACTATE AND CALCIUM CHLORIDE: 600; 310; 30; 20 INJECTION, SOLUTION INTRAVENOUS at 07:34

## 2020-04-07 RX ADMIN — FENTANYL CITRATE 50 MCG: 0.05 INJECTION, SOLUTION INTRAMUSCULAR; INTRAVENOUS at 09:39

## 2020-04-07 RX ADMIN — ROCURONIUM BROMIDE 5 MG: 10 INJECTION, SOLUTION INTRAVENOUS at 07:42

## 2020-04-07 RX ADMIN — DEXAMETHASONE SODIUM PHOSPHATE 10 MG: 10 INJECTION INTRAMUSCULAR; INTRAVENOUS at 07:49

## 2020-04-07 RX ADMIN — SODIUM CHLORIDE, POTASSIUM CHLORIDE, SODIUM LACTATE AND CALCIUM CHLORIDE 100 ML/HR: 600; 310; 30; 20 INJECTION, SOLUTION INTRAVENOUS at 07:02

## 2020-04-07 RX ADMIN — MIDAZOLAM 2 MG: 1 INJECTION INTRAMUSCULAR; INTRAVENOUS at 07:25

## 2020-04-07 RX ADMIN — HYDROMORPHONE HYDROCHLORIDE 0.5 MG: 1 INJECTION, SOLUTION INTRAMUSCULAR; INTRAVENOUS; SUBCUTANEOUS at 09:24

## 2020-04-07 RX ADMIN — SUCCINYLCHOLINE CHLORIDE 140 MG: 20 INJECTION, SOLUTION INTRAMUSCULAR; INTRAVENOUS; PARENTERAL at 07:42

## 2020-04-07 RX ADMIN — FENTANYL CITRATE 50 MCG: 50 INJECTION INTRAMUSCULAR; INTRAVENOUS at 07:39

## 2020-04-07 RX ADMIN — ONDANSETRON HYDROCHLORIDE 4 MG: 2 SOLUTION INTRAMUSCULAR; INTRAVENOUS at 08:35

## 2020-04-07 RX ADMIN — HYDROCODONE BITARTRATE AND ACETAMINOPHEN 1 TABLET: 7.5; 325 TABLET ORAL at 09:36

## 2020-04-07 RX ADMIN — PROPOFOL 200 MG: 10 INJECTION, EMULSION INTRAVENOUS at 07:42

## 2020-04-07 RX ADMIN — FENTANYL CITRATE 50 MCG: 0.05 INJECTION, SOLUTION INTRAMUSCULAR; INTRAVENOUS at 09:20

## 2020-04-07 RX ADMIN — LIDOCAINE HYDROCHLORIDE 100 MG: 20 INJECTION, SOLUTION INFILTRATION; PERINEURAL at 07:42

## 2020-04-07 RX ADMIN — HYDROMORPHONE HYDROCHLORIDE 0.5 MG: 1 INJECTION, SOLUTION INTRAMUSCULAR; INTRAVENOUS; SUBCUTANEOUS at 10:15

## 2020-04-07 RX ADMIN — FAMOTIDINE 20 MG: 10 INJECTION, SOLUTION INTRAVENOUS at 07:02

## 2020-04-07 RX ADMIN — CEFAZOLIN SODIUM 2 G: 2 INJECTION, SOLUTION INTRAVENOUS at 07:48

## 2020-04-07 NOTE — ANESTHESIA POSTPROCEDURE EVALUATION
Patient: Stacy Grewal    Procedure Summary     Date:  04/07/20 Room / Location:  [facility] OR [address] [place] [address] MAIN OR    Anesthesia Start:  0734 Anesthesia Stop:  0913    Procedure:  RIGHT port placement. (Right ) Diagnosis:       Carcinoma of upper-inner quadrant of left breast in female, estrogen receptor positive (CMS/HCC)      (Carcinoma of upper-inner quadrant of left breast in female, estrogen receptor positive (CMS/HCC) [C50.212, Z17.0])    Surgeon:  Sunni Barber MD Provider:  Masood Boland MD    Anesthesia Type:  general ASA Status:  2          Anesthesia Type: general    Vitals  Vitals Value Taken Time   /73 4/7/2020  9:41 AM   Temp 36.8 °C (98.3 °F) 4/7/2020  9:41 AM   Pulse 80 4/7/2020  9:45 AM   Resp 16 4/7/2020  9:41 AM   SpO2 88 % 4/7/2020  9:42 AM   Vitals shown include unvalidated device data.        Post Anesthesia Care and Evaluation    Patient location during evaluation: PHASE II  Anesthetic complications: No anesthetic complications

## 2020-04-07 NOTE — DISCHARGE INSTRUCTIONS
**SEE PORT BOOKLET AND CARD**  **YOU HAD A PAIN PILL WITH TYLENOL AT 9:36 AM**    Please give patients the following postoperative WOUND CARE and discharge instructions:     FOR PATIENTS WHO HAVE HAD BREAST SURGERY:   IF..... patient has had reconstruction or oncoplastic closure, wound care per plastic surgeon.   IF PATIENT DOES NOT HAVE PLASTIC SURGEON, keep ace wrap (if present)  in place and dressings dry for 72 hours. May then remove ACE wrap (if present) and dressings and may shower. Leave steri strips on skin and ignore clear suture tails.  Blot dry incision with towel.  No tubs, hot tubs, pools. May wear post op bra after removing ACE. Prefer patient to wear either bra or ACE until followup visit.     FOR PATIENTS WHO HAVE HAD PORT PLACED:   Keep dressing  in place and dressings dry for 72 hours.   May then remove dressings and may shower. Leave steri strips on skin and ignore clear suture tails.  Blot dry incision with towel.  No tubs, hot tubs, pools.   May use port immediately, but prefer to keep incision dry for 3 days.  Do not remove steri strips for 7-10 days.     FOR ALL PATIENTS:   Responsible adult to stay with patient at discharge and at least 24 hours after discharge.   Call the office for any of the following: persistent bleeding, excessive bruising or swelling, excessive sleepiness or trouble breathing, severe pain, persistent nausea or vomiting, fever greater than 101.0     Postop appointment was scheduled in the office preoperatively. If patient cannot find that appointment, please call the office for a reminder on the next business day. [phone].

## 2020-04-07 NOTE — OP NOTE
Operative note    Preoperative Diagnosis:  Breast cancer, LEFT    Postoperative Diagnosis: Breast cancer, LEFT    Procedure Performed: right cephalic vein cutdown port placement    Dictating physician and surgeon: Sunni Barber MD    EBL:15cc    FINDINGS AND DESCRIPTION OF PROCEDURE:       The patient was brought to the operating room and placed on the table in the supine position. After adequate general anesthesia was obtained, we sterilly prepped and draped bilateral anterior chest wall.   We did a time out to identify correct patient and correct operative site.  She did receive IV antibiotic within an hour of and prior to incision.     I made an oblique incision in the deltopectoral groove using a 15 blade and dissected down using bovie electrocautery to open the clavipectoral fascia.   I identified the cephalic vein and dissected this out circumferentially and ligated the proximal portion closest to the arm using a 3-0 silk suture.  I left the distal end patient. I then flushed the port after assembling it, using 10 unit per cc heparainized saline.    I used the following port: 8Fr power port     I made a pocket on the pectoral fascia using bovie electrocautery I then made a venotomy with the 11 blade, and threaded the catheter proximally. This threaded with ease. I then used intraopertive fluoroscopy to ensure that the catheter tip was in good position near the atriocaval junction.    I then secured the catheter in the proximal cephalic vein using the 3-0 silk.    I  I then sewed the port down to the pectoral fascia using 3 x 2-0 prolene sutures.    I then flushed and jamilah from the port using heparainized saline 10 unit per cc to ensure that the port worked properly, which it did.    I then irrigated, assured hemostasis and closed the skin in 2 layers. the first being n interrupted 3-0 vicryl layer, followed by a running 4-0 monocryl.    I then applied lengthwise 1/2 inch steri strips, 2x2 s and a  shakeel.    She will have a CXR in the recovery room.    She tolerated the procedure well, there were no immediate complications, and all counts were correct at the end of the case.

## 2020-04-07 NOTE — ANESTHESIA PREPROCEDURE EVALUATION
Anesthesia Evaluation     Patient summary reviewed   no history of anesthetic complications:  NPO Solid Status: > 6 hours  NPO Liquid Status: > 2 hours           Airway   Mallampati: II  TM distance: >3 FB  Neck ROM: full  No difficulty expected  Dental      Pulmonary - normal exam   (-) not a smoker  Cardiovascular - normal exam    (-) angina      Neuro/Psych  (+) psychiatric history Anxiety and Depression,     GI/Hepatic/Renal/Endo      Musculoskeletal     Abdominal    Substance History      OB/GYN    (-)  Pregnant        Other      history of cancer (BREAST)                    Anesthesia Plan    ASA 2     general       Anesthetic plan, all risks, benefits, and alternatives have been provided, discussed and informed consent has been obtained with: patient.

## 2020-04-07 NOTE — ANESTHESIA PROCEDURE NOTES
Airway  Urgency: elective    Date/Time: 4/7/2020 7:45 AM    General Information and Staff    Patient location during procedure: OR  Anesthesiologist: Masood Boland MD  CRNA: Cristina Blunt CRNA    Indications and Patient Condition  Indications for airway management: airway protection    Preoxygenated: yes  Mask difficulty assessment: 0 - not attempted    Final Airway Details  Final airway type: endotracheal airway      Successful airway: ETT  Cuffed: yes   Successful intubation technique: direct laryngoscopy  Facilitating devices/methods: intubating stylet  Endotracheal tube insertion site: oral  Blade: Nelly  Blade size: 3  ETT size (mm): 6.5  Cormack-Lehane Classification: grade IIa - partial view of glottis  Placement verified by: chest auscultation and capnometry   Measured from: lips  ETT/EBT  to lips (cm): 22  Number of attempts at approach: 1

## 2020-04-10 ENCOUNTER — APPOINTMENT (OUTPATIENT)
Dept: LAB | Facility: HOSPITAL | Age: 40
End: 2020-04-10

## 2020-04-17 ENCOUNTER — TELEPHONE (OUTPATIENT)
Dept: SURGERY | Facility: CLINIC | Age: 40
End: 2020-04-17

## 2020-04-17 NOTE — TELEPHONE ENCOUNTER
instructions to; Move her ultrasound and same-day visit with me to the first week of June. She is a neoadjuvant patient and cannot be moved.     6/1/2020 [facility] 9:30  Follow up 6/1/2020 11:00 with Dr. Barber.     I had to leave patient a voicemail, unable to mailbox full    CONFIRMED.

## 2020-05-15 ENCOUNTER — TELEPHONE (OUTPATIENT)
Dept: SURGERY | Facility: CLINIC | Age: 40
End: 2020-05-15

## 2020-05-15 NOTE — TELEPHONE ENCOUNTER
Patient calls today wanting to know if Dr. Barber has spoke to her Medical Oncologist Dr. Meade.   States he recommend scans due to elevated liver enzymes, but her insurance would not cover.     U/S breast an follow up with us is scheduled 6/1/2020.

## 2020-05-19 ENCOUNTER — TELEPHONE (OUTPATIENT)
Dept: SURGERY | Facility: CLINIC | Age: 40
End: 2020-05-19

## 2020-05-19 NOTE — TELEPHONE ENCOUNTER
5-5-20 Note frmo Dr Chew at [facility] in [facility]: Patient could not tolerate the scalp cooling device.  She has lost her hair.  Treatment summary: start date 4-15-20- 4 cycles of AC, to be completed 5-27, then followed by taxol    Dr Chew plans q3 week taxol due to travel with last dose 7-22-20    April 3, 2020 CT abdomen and pelvis at [facility]: No evidence for metastatic disease or adenopathy.  Nonspecific bilateral ovarian lesions.  However the right ovarian dominant lesion appears larger than prior recommend pelvic ultrasound.  CT chest April 3, 2020 [facility]: 3 cm left breast mass consistent with known malignancy.  Axillary lymph nodes containing fat without discrete enlargement.  No mediastinal or hilar adenopathy.  Bilateral lower lobe atelectasis without infiltrate mass or pleural effusion.  No evidence for metastatic lesion or adenopathy.

## 2020-05-27 ENCOUNTER — TELEPHONE (OUTPATIENT)
Dept: SURGERY | Facility: CLINIC | Age: 40
End: 2020-05-27

## 2020-05-27 NOTE — TELEPHONE ENCOUNTER
I let patient know we are planning and U/S 6/1. No other scans planned. It will be up to Medical Oncologist if additional scan are requested.

## 2020-06-01 ENCOUNTER — OFFICE VISIT (OUTPATIENT)
Dept: SURGERY | Facility: CLINIC | Age: 40
End: 2020-06-01

## 2020-06-01 ENCOUNTER — HOSPITAL ENCOUNTER (OUTPATIENT)
Dept: ULTRASOUND IMAGING | Facility: HOSPITAL | Age: 40
Discharge: HOME | End: 2020-06-01
Admitting: SURGERY

## 2020-06-01 VITALS
WEIGHT: 205 LBS | HEART RATE: 97 BPM | HEIGHT: 63 IN | SYSTOLIC BLOOD PRESSURE: 115 MMHG | OXYGEN SATURATION: 97 % | DIASTOLIC BLOOD PRESSURE: 74 MMHG | BODY MASS INDEX: 36.32 KG/M2 | TEMPERATURE: 97.3 F

## 2020-06-01 DIAGNOSIS — C50.212 MALIGNANT NEOPLASM OF UPPER-INNER QUADRANT OF LEFT BREAST IN FEMALE, ESTROGEN RECEPTOR POSITIVE (HCC): ICD-10-CM

## 2020-06-01 DIAGNOSIS — Z17.0 MALIGNANT NEOPLASM OF UPPER-INNER QUADRANT OF LEFT BREAST IN FEMALE, ESTROGEN RECEPTOR POSITIVE (HCC): ICD-10-CM

## 2020-06-01 DIAGNOSIS — N63.0 LUMP OR MASS IN BREAST: ICD-10-CM

## 2020-06-01 DIAGNOSIS — Z80.3 FH: BREAST CANCER: ICD-10-CM

## 2020-06-01 DIAGNOSIS — C50.212 MALIGNANT NEOPLASM OF UPPER-INNER QUADRANT OF LEFT FEMALE BREAST, UNSPECIFIED ESTROGEN RECEPTOR STATUS (HCC): Primary | ICD-10-CM

## 2020-06-01 DIAGNOSIS — Z87.440 HISTORY OF FREQUENT URINARY TRACT INFECTIONS: ICD-10-CM

## 2020-06-01 PROCEDURE — 99214 OFFICE O/P EST MOD 30 MIN: CPT | Performed by: SURGERY

## 2020-06-01 PROCEDURE — 76642 ULTRASOUND BREAST LIMITED: CPT

## 2020-06-01 RX ORDER — PROMETHAZINE HYDROCHLORIDE 25 MG/1
12.5-25 TABLET ORAL
COMMUNITY
Start: 2020-05-27 | End: 2020-08-24

## 2020-06-01 RX ORDER — OLANZAPINE 10 MG/1
10 TABLET ORAL
COMMUNITY
Start: 2020-04-15 | End: 2020-08-24

## 2020-06-01 RX ORDER — DEXAMETHASONE 0.5 MG/1
TABLET ORAL
COMMUNITY
Start: 2020-04-20 | End: 2020-08-24

## 2020-06-01 RX ORDER — ONDANSETRON HYDROCHLORIDE 8 MG/1
8 TABLET, FILM COATED ORAL
COMMUNITY
Start: 2020-04-07 | End: 2020-08-24

## 2020-06-01 NOTE — PROGRESS NOTES
Chief Complaint: Stacy Grewal is a 40 y.o. female who was seen in consultation at the request of Brenda Sheikh MD  for newly diagnosed breast cancer and a followup visit    History of Present Illness:  Patient presents with newly diagnosed breast cancer, LEFT breast.  She noted a left breast mass in January.  She does not feel that this is gotten any larger.  She noticed no associated pain with this.  No associated skin changes.  She noticed it first when looking in the mirror at the gym.      She noted no other new masses, skin changes, nipple discharge, nipple changes prior to her most recent imaging.  Her most recent imaging includes the followin2020  [facility]   Radiology  Stacy Grewal  SONOGRAM LEFT LTD  MAMM DX BILAT  Clinical history: Left breast mass, upper outer quadrant.   Comparison: None, baseline exam.   Breasts are composed of scattered fibroglandular tissue. 3 cm high density speculated mass 11-12:00 left breast middle one third. Associated pleomorphic microcalcifications and prominent architectural distortion. Corresponds to the palpable abnormality. Right breast is unremarkable.   Left Breast US: 3.2 x 2.3 x 1.9 cm mass 11-12:00. Recommend ultrasound-guided core needle biopsy.  BIRADS: 5      She had a biopsy on the following day that showed:     2020  [facility]   Hazel Grewal  US/BX BREAST LEFT  MAMM EDX UNILATERAL LEFT  Left breast. 4 core needle biopsy specimen 9 gauge device. A marker clip was then deployed within the biopsy bed.  Diagnostic Left Mammo: Postprocedure diagnostic left craniocaudal view confirms marker clip placement within the biopsied mass.    2020  [facility]   Radiology  Stacy Grewal  Solid architecture, high nuclear grade, and infrequent mitotic figures.  Invasive ductal carcinoma, grade 2.    2020  [facility]   Pathology  Stacy Grewal  OUTSIDE PATH CONSULT  Left, Core Biopsy:  A. Invasive mammary  carcinoma, no special type, grade II/III (tubules =3, nuclei =2, mitoses =2) 14 mm.  B. ER - 60%; MS - 10%; HER2 (Score 1+)  C. Ki-67 - 20%  D. No lymphvascular invasion.  E. No in situ component identified.        We then arranged for a breast MRI:    03/18/2020  [facility]  Radiololgy  Stacy Grewal  BILATERAL BREAST MRI  Middle third left breast 11:00 upper inner quadrant there is an irregular mass 4.4 cm in anterior-posterior, 3.7 cm in the superior-inferior 3.1 cm in the medial-lateral. A metallic clip is located along the anterolateral margin of the mass.  No evidence for left axillary or internal mammary chain adenopathy.  No findings suspicious for malignancy in the right breast.      She has not had a breast biopsy in the past.  She has her uterus and ovaries, is premenopausal, and takes no hormones.  Her family history includes the following: She has 1 daughter, 5 sisters, 4 maternal aunts, one paternal aunt, she has a paternal cousin who had breast cancer in her mid 30s, she has a paternal great aunt who had breast cancer.      Interval History:  In the interim, Stacy took DDAC from April 15, 2020 through May 27, 2020.  She is set to start her Taxol next Wednesday on Holley 10.  She tells me that her port is drawing and flushing properly.  She tells me that she feels the mass is smaller and softer in the breast.  She denies any new breast masses, skin changes, nipple changes, nipple discharge.  She denies any headache, bone pain, belly pain, cough, changes in vision or gait.  Her genetics returned as negative for mutation.  She had staging CT chest abdomen pelvis April 3, 2020 that was negative for metastatic disease.  Ovarian lesions were seen and a recommendation was made for transvaginal ultrasound.    Her most recent imaging is a left breast ultrasound in radiology today: Previous measurements 3.2 x 1.9 x 2.3.  Today 3.0 x 1.9 x 2.2.  Stable no significant change BI-RADS 6.    She is here for  review.    Review of Systems:  Review of Systems   Constitutional: Positive for diaphoresis, fatigue and unexpected weight change (18 lb wt gain ). Negative for appetite change, chills and fever.   HENT:   Negative for hearing loss, lump/mass, mouth sores, nosebleeds, sore throat, tinnitus, trouble swallowing and voice change.    Eyes: Negative for eye problems and icterus.   Respiratory: Negative for chest tightness, cough, hemoptysis, shortness of breath and wheezing.    Cardiovascular: Negative for chest pain, leg swelling and palpitations.   Gastrointestinal: Negative for abdominal distention, abdominal pain, blood in stool, constipation, diarrhea, nausea, rectal pain and vomiting.   Endocrine: Positive for hot flashes.   Genitourinary: Positive for frequency, nocturia and vaginal discharge. Negative for bladder incontinence, difficulty urinating, dyspareunia, dysuria, hematuria, menstrual problem, pelvic pain and vaginal bleeding.    Musculoskeletal: Positive for back pain and myalgias. Negative for arthralgias, flank pain, gait problem, neck pain and neck stiffness.   Skin: Negative for itching, rash and wound.   Neurological: Negative for dizziness, extremity weakness, gait problem, headaches, light-headedness, numbness, seizures and speech difficulty.   Hematological: Negative for adenopathy. Does not bruise/bleed easily.   Psychiatric/Behavioral: Positive for decreased concentration, depression and sleep disturbance. Negative for confusion and suicidal ideas. The patient is nervous/anxious.         Past Medical and Surgical History:  Breast Biopsy History:  Patient had not had a breast biopsy prior to her cancer diagnosis.  Breast Cancer HIstory:  Patient does not have a past medical history of breast cancer.  Breast Operations, and year:  None   Obstetric/Gynecologic History:  Age menstrual periods began: 12  Patient is premenopausal, first day of last period: approx 3/23/2020  Number of pregnancies:  2  Number of live births: 2  Number of abortions or miscarriages: 0  Age of delivery of first child: 22  Patient did not breast feed.  Length of time taking birth control pills: 6-8 yrs   Patient has never taken hormone replacement  Patient has both ovaries and uterus.     Past Surgical History:   Procedure Laterality Date   • APPENDECTOMY     • BREAST BIOPSY     • VENOUS ACCESS DEVICE (PORT) INSERTION Right 4/7/2020    Procedure: RIGHT port placement.;  Surgeon: Sunni Barber MD;  Location: [facility];  Service: General;  Laterality: Right;     Past Medical History:   Diagnosis Date   • Anemia    • Anesthesia complication     PATIENT STATED THAT AFTER HER APPENDIX WAS TAKEN OUT, HER  TOLD HER THAT IN RECOVERY THEY HAD TO PUT HER ON A VENTILATOR BECAUSE HER HEART RATE SLOWED AND HER BREATHING SLOWED.   • Anxiety    • Breast cancer (CMS/HCC)     Left   • Depression    • HA (headache)    • History of UTI        Prior Hospitalizations, other than for surgery or childbirth, and year:  None     Social History     Socioeconomic History   • Marital status:      Spouse name: Garret   • Number of children: 2   • Years of education: Not on file   • Highest education level: Not on file   Occupational History   • Occupation: [employer]     Employer: [employer]   Tobacco Use   • Smoking status: Never Smoker   • Smokeless tobacco: Never Used   Substance and Sexual Activity   • Alcohol use: Yes     Comment: RARE   • Drug use: Never   • Sexual activity: Defer     Patient is .  Patient is employed full time with the following occupation: [employer]   Patient drinks 1-2 servings of caffeine per day.    Family History:  Family History   Problem Relation Age of Onset   • Breast cancer Paternal Aunt         GREAT PATERNAL AUNT    • Breast cancer Cousin 35        PATERNAL COUSIN    • Malig Hyperthermia Neg Hx        Vital Signs:  /74   Pulse 97   Temp 97.3 °F (36.3 °C) (Temporal)   Ht 160 cm  "(63\")   Wt 93 kg (205 lb)   LMP  (LMP Unknown)   SpO2 97%   Breastfeeding No   BMI 36.31 kg/m²      Medications:    Current Outpatient Medications:   •  dexamethasone (DECADRON) 0.5 MG tablet, DISSOLVE 1 TAB IN 10 ML WATER, HOLD IN MOUTH 5 MIN, THEN SPIT. DO 4X DAILY., Disp: , Rfl:   •  OLANZapine (zyPREXA) 10 MG tablet, Take 10 mg by mouth., Disp: , Rfl:   •  ondansetron (ZOFRAN) 8 MG tablet, Take 8 mg by mouth., Disp: , Rfl:   •  promethazine (PHENERGAN) 25 MG tablet, Take 12.5-25 mg by mouth., Disp: , Rfl:   •  sertraline (ZOLOFT) 100 MG tablet, Take 200 mg by mouth Daily., Disp: , Rfl:      Allergies:  No Known Allergies    Physical Examination:  /74   Pulse 97   Temp 97.3 °F (36.3 °C) (Temporal)   Ht 160 cm (63\")   Wt 93 kg (205 lb)   LMP  (LMP Unknown)   SpO2 97%   Breastfeeding No   BMI 36.31 kg/m²   General Appearance:  Patient is in no distress.  She is well kept and has an obese build.   Psychiatric:  Patient with appropriate mood and affect. Alert and oriented to self, time, and place.    Breast, RIGHT:  large sized,44DD,  symmetric with the contralateral side.  Breast skin is without erythema, edema, rashes.  There are no visible abnormalities upon inspection during the arm-raising maneuver or with hands on hips in the sitting position. There is no nipple retraction, discharge or nipple/areolar skin changes.There are no masses palpable in the sitting or supine positions.    Breast, LEFT:  large sized, 44DD, symmetric with the contralateral side.  Breast skin is without erythema, edema, rashes.  There are no visible abnormalities upon inspection during the arm-raising maneuver or with hands on hips in the sitting position. There is no nipple retraction, discharge or nipple/areolar skin changes. There is a 3 cm (initialy 4 x 3.5 cm) mass palpable at the left breast 1130, 9.5 cm from the nipple location of known malignancy.  There are no overlying skin changes.  It is nontender.  There " are no other masses palpable in the sitting or supine positions.    Lymphatic:  There is no axillary, cervical, infraclavicular, or supraclavicular adenopathy bilaterally.  Eyes:  Pupils are round and reactive to light.  Cardiovascular:  Heart rate and rhythm are regular.  Respiratory:  Lungs are clear bilaterally with no crackles or wheezes in any lung field.  Gastrointestinal:  Abdomen is soft, nondistended, and nontender.     Musculoskeletal:  Good strength in all 4 extremities.   There is good range of motion in both shoulders.    Skin:  No new skin lesions or rashes on the skin excluding the breast (see breast exam above).        Imagin2020  [facility]   Radiology  Stacy Harriet  SONOGRAM LEFT LTD  MAMM DX BILAT  Clinical history: Left breast mass, upper outer quadrant.   Comparison: None, baseline exam.   Breasts are composed of scattered fibroglandular tissue. 3 cm high density speculated mass 11-12:00 left breast middle one third. Associated pleomorphic microcalcifications and prominent architectural distortion. Corresponds to the palpable abnormality. Right breast is unremarkable.   Left Breast US: 3.2 x 2.3 x 1.9 cm mass 11-12:00. Recommend ultrasound-guided core needle biopsy.  BIRADS: 5    2020  [facility]  Radiololgy  Stacy Harriet  BILATERAL BREAST MRI  Middle third left breast 11:00 upper inner quadrant there is an irregular mass 4.4 cm in anterior-posterior, 3.7 cm in the superior-inferior 3.1 cm in the medial-lateral. A metallic clip is located along the anterolateral margin of the mass.  No evidence for left axillary or internal mammary chain adenopathy.  No findings suspicious for malignancy in the right breast.    April 3, 2020 CT abdomen and pelvis at [facility]: No evidence for metastatic disease or adenopathy.  Nonspecific bilateral ovarian lesions.  However the right ovarian dominant lesion appears larger than prior recommend pelvic ultrasound.  CT chest April  3, 2020 [facility]: 3 cm left breast mass consistent with known malignancy.  Axillary lymph nodes containing fat without discrete enlargement.  No mediastinal or hilar adenopathy.  Bilateral lower lobe atelectasis without infiltrate mass or pleural effusion.  No evidence for metastatic lesion or adenopathy.     [facility] left breast ultrasound June 1, 2020: 3.0 x 1.9 x 2.2 cm lesion down from 3.2 x 1.9 x 2.3 cm lesion.  BI-RADS 6    Pathology:    03/04/2020  [facility]   Radiology  Stacy Grewal  US/BX BREAST LEFT  MAMM EDX UNILATERAL LEFT  Left breast. 4 core needle biopsy specimen 9 gauge device. A marker clip was then deployed within the biopsy bed.  Diagnostic Left Mammo: Postprocedure diagnostic left craniocaudal view confirms marker clip placement within the biopsied mass.    03/04/2020  [facility]   Radiology  Stacy Grewal  Solid architecture, high nuclear grade, and infrequent mitotic figures.  Invasive ductal carcinoma, grade 2.    03/13/2020  [facility]   Pathology  Stacy Grewal  OUTSIDE PATH CONSULT  Left, Core Biopsy:  F. Invasive mammary carcinoma, no special type, grade II/III (tubules =3, nuclei =2, mitoses =2) 14 mm.  G. ER - 60%; DE - 10%; HER2 (Score 1+)  H. Ki-67 - 20%  I. No lymphvascular invasion.  J. No in situ component identified.        Procedures:      Assessment:   Diagnosis Plan   1. Malignant neoplasm of upper-inner quadrant of left female breast, unspecified estrogen receptor status (CMS/HCC)     2. Lump or mass in breast     3. FH: breast cancer     4. History of frequent urinary tract infections        1-2  Left breast 1130, 9.5 cm from the nipple, 4 cm on exam, 4.4 cm on MRI, 3.2 cm on ultrasound.  Invasive mammary carcinoma, no special type, intermediate grade, 3, 2, 2, 1.4 cm largest in a core, estrogen 60, progesterone 10, HER-2/mary 1+, Ki-67 is 20%.  No lymphovascular invasion, no duct carcinoma site 2,  Clinical stage T2N0 stage IIa.      Deshaun-  DDAC from April 15, 2020 through May 27, 2020.  - set to start her Taxol next Wednesday on Holley 10.    -staging  CT chest abdomen pelvis April 3, 2020 that was negative for metastatic disease.  Ovarian lesions were seen and a recommendation was made for transvaginal ultrasound. Dr Meade plans to do this at a later date    -port placed by me. Functioning well.        3-  Paternal cousin age 35, paternal great aunt uncertain age  [facility] breast and gyn panel returned 3-30-20 as negative for mutation      4-  Postcoital UTIs    Plan:  Stacy and I reviewed her interval history, imaging, imaging reports, treatment, and examination together today.  On examination the mass is softer and smaller than it initial presentation.  On imaging the lesion is not as dense appearing but has no significant change in size.  I talked with Dr. Chew her medical oncologist about this today.  We will plan to give her the Taxol portion of her treatment prior to surgery.  He is going to verify the date of her last treatment.  Her Taxol supposed to start Holley 10.  The patient was told every 2 weeks.  Her medical oncologist is seeing every 3 weeks.  As I said he will call and let us know so we can plan accordingly.      I will plan for a MRI of the breast as well as a mammogram of the left breast to reassess the calcifications.  She is interested in a lumpectomy and would be interested in seeing a plastic surgeon for an oncoplastic reduction and contralateral mastopexy for symmetry.    We will arrange for her to see plastic surgery.    We will plan to leave her port in place at the time of surgery until we have her pathology report.    Tentatively we would plan for a left breast image guided lumpectomy with a sentinel lymph node biopsy, possible axillary lymph node dissection and possible oncoplastics.  To leave the port in place.  She has gained 28 pounds in the interim while on the steroids and chemotherapy.  I offered her to  see nutrition but she does not wish to do that at this juncture.  We did discuss the negative impact on healing and on her breast cancer natural history with weight gain.  She was understanding of this.      I will see her back after her last Taxol and her imaging.  As mentioned we will wait to hear from her medical oncologist about the timing of this.          Sunni Barber MD    -- addendum- last dose of taxol is confirmed as 7-22-20- Will plan for MRI July 24, see me after. PLan for PAT August 20 and PAT the week after    We have spent 35minutes in visit today, 25 in face to face .    Next Appointment:  Return for after imaging, Next scheduled follow up.      EMR Dragon/transcription disclaimer:    Much of this encounter note is an electronic transcription/translocation of spoken language to printed text.  The electronic translation of spoken language may permit erroneous, or at times, nonsensical words or phrases to be inadvertently transcribed.  Although I have reviewed the note from such areas, some may still exist.

## 2020-06-02 ENCOUNTER — TELEPHONE (OUTPATIENT)
Dept: SURGERY | Facility: CLINIC | Age: 40
End: 2020-06-02

## 2020-06-02 NOTE — TELEPHONE ENCOUNTER
Scheduled Consult with Dr Davidson.  6-25-20 arrive 8:15    Scheduled Lt diagnostic Mammo @ [facility] 7-24-20  Scheduled Bilateral Breast MRI w wo contrast 7-24-20    Arrive at 9:00    Return to see Dr ORTIZ  8-10-20 arrive 8:15 (pt picked date due to being out of town)    Salud

## 2020-07-24 ENCOUNTER — TELEPHONE (OUTPATIENT)
Dept: SURGERY | Facility: CLINIC | Age: 40
End: 2020-07-24

## 2020-07-24 ENCOUNTER — APPOINTMENT (OUTPATIENT)
Dept: MRI IMAGING | Facility: HOSPITAL | Age: 40
End: 2020-07-24

## 2020-07-24 ENCOUNTER — APPOINTMENT (OUTPATIENT)
Dept: MAMMOGRAPHY | Facility: HOSPITAL | Age: 40
End: 2020-07-24

## 2020-07-24 NOTE — TELEPHONE ENCOUNTER
Pt no showed for her imaging today, this has been rescheduled to  8-12-20. Pt will see Dr ORTIZ 8-17-20 arrive 8:45    Pt v/u  Salud

## 2020-08-11 ENCOUNTER — TELEPHONE (OUTPATIENT)
Dept: SURGERY | Facility: CLINIC | Age: 40
End: 2020-08-11

## 2020-08-12 ENCOUNTER — HOSPITAL ENCOUNTER (OUTPATIENT)
Dept: MRI IMAGING | Facility: HOSPITAL | Age: 40
Discharge: HOME | End: 2020-08-12

## 2020-08-12 ENCOUNTER — TELEPHONE (OUTPATIENT)
Dept: SURGERY | Facility: CLINIC | Age: 40
End: 2020-08-12

## 2020-08-12 ENCOUNTER — HOSPITAL ENCOUNTER (OUTPATIENT)
Dept: MAMMOGRAPHY | Facility: HOSPITAL | Age: 40
Discharge: HOME | End: 2020-08-12
Admitting: SURGERY

## 2020-08-12 DIAGNOSIS — C50.212 MALIGNANT NEOPLASM OF UPPER-INNER QUADRANT OF LEFT FEMALE BREAST, UNSPECIFIED ESTROGEN RECEPTOR STATUS (HCC): ICD-10-CM

## 2020-08-12 PROCEDURE — 77049 MRI BREAST C-+ W/CAD BI: CPT

## 2020-08-12 PROCEDURE — 77065 DX MAMMO INCL CAD UNI: CPT

## 2020-08-12 PROCEDURE — A9575 INJ GADOTERATE MEGLUMI 0.1ML: HCPCS | Performed by: SURGERY

## 2020-08-12 PROCEDURE — 25010000002 GADOTERATE MEGLUMINE 10 MMOL/20ML SOLUTION: Performed by: SURGERY

## 2020-08-12 RX ORDER — GADOTERATE MEGLUMINE 376.9 MG/ML
20 INJECTION INTRAVENOUS
Status: COMPLETED | OUTPATIENT
Start: 2020-08-12 | End: 2020-08-12

## 2020-08-12 RX ADMIN — GADOTERATE MEGLUMINE 20 ML: 376.9 INJECTION, SOLUTION INTRAVENOUS at 13:51

## 2020-08-12 NOTE — TELEPHONE ENCOUNTER
-Located within [facility] bilateral breast MRI August 12, 2020: Scattered fibroglandular densities.  Middle one third left breast 11:00, upper inner quadrant there is a metallic clip mini cork-shaped within an irregular enhancing mass that measures 3.3 x 2.1 x 2.2 cm.  Previously it measured 4.4 x 3.7 x 3.7 cm.  There is been interval development of central hypointense enhancement suggestive of tumor necrosis.  There is been interval reduction in surrounding tissue vascularity.  No evidence for left axillary or internal mammary adenopathy.  No abnormal areas of enhancement in the right breast.    Impression interval partial response to neoadjuvant chemotherapy.    LEFT DX mammogram Located within [facility] dated 8-12-20 showed fatty replaced parenchyma.  In the posterior one third upper inner quadrant left breast is a metallic clip within an irregular mass.  The mass measures 2.8 x 2.2 cm compared to previous measurements of 3.3 x 3.0.  No axillary adenopathy.  Impression interval decrease in size of the biopsy-proven malignancy in the left breast in the posterior one third upper inner quadrant.  Consistent with interval partial response to neoadjuvant chemotherapy.    Requested Dr Brush comment on the calcifications extent on this mammogram    [facility] left diagnostic mammogram addendum: There has been partial interval resolution of microcalcifications associated with malignancy in the left breast since the prior examination.  Fewer microcalcifications remain in the majority are located at the medial margin of the malignancy with fewer microcalcifications along the lateral margin.  The calcifications that remain are located within the stated dimensions of the malignancy.  No suspicious microcalcifications greater than 5 mm away from the margin of the malignancy are visualized.

## 2020-08-17 ENCOUNTER — TELEPHONE (OUTPATIENT)
Dept: SURGERY | Facility: CLINIC | Age: 40
End: 2020-08-17

## 2020-08-17 NOTE — TELEPHONE ENCOUNTER
Pt late for appointment today with Dr SKY  Rescheduled her for 8-20-20 arrive 3:15    Spoke to pt prabhjot Brannon

## 2020-08-18 ENCOUNTER — TELEPHONE (OUTPATIENT)
Dept: SURGERY | Facility: CLINIC | Age: 40
End: 2020-08-18

## 2020-08-18 NOTE — TELEPHONE ENCOUNTER
Note from Dr Good Chew at [facility]- 8-6-20-  Patient completed her scheduled chemotherapy July 22, 2020.

## 2020-08-20 ENCOUNTER — PREP FOR SURGERY (OUTPATIENT)
Dept: OTHER | Facility: HOSPITAL | Age: 40
End: 2020-08-20

## 2020-08-20 ENCOUNTER — OFFICE VISIT (OUTPATIENT)
Dept: SURGERY | Facility: CLINIC | Age: 40
End: 2020-08-20

## 2020-08-20 ENCOUNTER — TELEPHONE (OUTPATIENT)
Dept: SURGERY | Facility: CLINIC | Age: 40
End: 2020-08-20

## 2020-08-20 VITALS
DIASTOLIC BLOOD PRESSURE: 77 MMHG | OXYGEN SATURATION: 97 % | TEMPERATURE: 97.3 F | BODY MASS INDEX: 34.55 KG/M2 | SYSTOLIC BLOOD PRESSURE: 114 MMHG | WEIGHT: 195 LBS | HEART RATE: 86 BPM | HEIGHT: 63 IN

## 2020-08-20 DIAGNOSIS — C50.212 MALIGNANT NEOPLASM OF UPPER-INNER QUADRANT OF LEFT FEMALE BREAST, UNSPECIFIED ESTROGEN RECEPTOR STATUS (HCC): ICD-10-CM

## 2020-08-20 DIAGNOSIS — C50.212 MALIGNANT NEOPLASM OF UPPER-INNER QUADRANT OF LEFT FEMALE BREAST, UNSPECIFIED ESTROGEN RECEPTOR STATUS (HCC): Primary | ICD-10-CM

## 2020-08-20 DIAGNOSIS — Z87.440 HISTORY OF FREQUENT URINARY TRACT INFECTIONS: ICD-10-CM

## 2020-08-20 DIAGNOSIS — F43.21 GRIEF REACTION: Primary | ICD-10-CM

## 2020-08-20 DIAGNOSIS — Z80.3 FH: BREAST CANCER: ICD-10-CM

## 2020-08-20 PROCEDURE — 99215 OFFICE O/P EST HI 40 MIN: CPT | Performed by: SURGERY

## 2020-08-20 RX ORDER — DIAZEPAM 5 MG/1
10 TABLET ORAL ONCE
Status: CANCELLED | OUTPATIENT
Start: 2020-08-26 | End: 2020-08-20

## 2020-08-20 RX ORDER — CEFAZOLIN SODIUM 2 G/100ML
2 INJECTION, SOLUTION INTRAVENOUS ONCE
Status: CANCELLED | OUTPATIENT
Start: 2020-08-26 | End: 2020-08-20

## 2020-08-20 RX ORDER — CHOLECALCIFEROL (VITAMIN D3) 125 MCG
5-10 CAPSULE ORAL NIGHTLY PRN
COMMUNITY
End: 2022-09-12

## 2020-08-20 RX ORDER — SODIUM CHLORIDE, SODIUM LACTATE, POTASSIUM CHLORIDE, CALCIUM CHLORIDE 600; 310; 30; 20 MG/100ML; MG/100ML; MG/100ML; MG/100ML
100 INJECTION, SOLUTION INTRAVENOUS CONTINUOUS
Status: CANCELLED | OUTPATIENT
Start: 2020-08-26

## 2020-08-20 NOTE — H&P
There are no changes in the history or physical exam, aside from any that are listed as an addendum at the bottom of this document.   Today, patient will go for the surgery listed in the plan below.    Chief Complaint: Stacy Grewal is a 40 y.o. female who was seen in consultation at the request of Brenda Sheikh MD  for newly diagnosed breast cancer and a followup visit    History of Present Illness:  Patient presents with newly diagnosed breast cancer, LEFT breast.  She noted a left breast mass in January.  She does not feel that this is gotten any larger.  She noticed no associated pain with this.  No associated skin changes.  She noticed it first when looking in the mirror at the gym.      She noted no other new masses, skin changes, nipple discharge, nipple changes prior to her most recent imaging.  Her most recent imaging includes the followin2020  [facility]   Radiology  Stacy Grewal  SONOGRAM LEFT LTD  MAMM DX BILAT  Clinical history: Left breast mass, upper outer quadrant.   Comparison: None, baseline exam.   Breasts are composed of scattered fibroglandular tissue. 3 cm high density speculated mass 11-12:00 left breast middle one third. Associated pleomorphic microcalcifications and prominent architectural distortion. Corresponds to the palpable abnormality. Right breast is unremarkable.   Left Breast US: 3.2 x 2.3 x 1.9 cm mass 11-12:00. Recommend ultrasound-guided core needle biopsy.  BIRADS: 5      She had a biopsy on the following day that showed:     2020  [facility]   Radiology  Stacy Grewal  US/BX BREAST LEFT  MAMM EDX UNILATERAL LEFT  Left breast. 4 core needle biopsy specimen 9 gauge device. A marker clip was then deployed within the biopsy bed.  Diagnostic Left Mammo: Postprocedure diagnostic left craniocaudal view confirms marker clip placement within the biopsied mass.    2020  [facility]   Radiology  Stacy Carmina  Solid architecture, high nuclear  grade, and infrequent mitotic figures.  Invasive ductal carcinoma, grade 2.    03/13/2020  [facility]   Pathology  Stacy Grewal  OUTSIDE PATH CONSULT  Left, Core Biopsy:  A. Invasive mammary carcinoma, no special type, grade II/III (tubules =3, nuclei =2, mitoses =2) 14 mm.  B. ER - 60%; VT - 10%; HER2 (Score 1+)  C. Ki-67 - 20%  D. No lymphvascular invasion.  E. No in situ component identified.        We then arranged for a breast MRI:    03/18/2020  [facility]  Radiololgy  Stacy Grewal  BILATERAL BREAST MRI  Middle third left breast 11:00 upper inner quadrant there is an irregular mass 4.4 cm in anterior-posterior, 3.7 cm in the superior-inferior 3.1 cm in the medial-lateral. A metallic clip is located along the anterolateral margin of the mass.  No evidence for left axillary or internal mammary chain adenopathy.  No findings suspicious for malignancy in the right breast.      She has not had a breast biopsy in the past.  She has her uterus and ovaries, is premenopausal, and takes no hormones.  Her family history includes the following: She has 1 daughter, 5 sisters, 4 maternal aunts, one paternal aunt, she has a paternal cousin who had breast cancer in her mid 30s, she has a paternal great aunt who had breast cancer.      In the interim, Stacy took DDAC from April 15, 2020 through May 27, 2020.  She is set to start her Taxol next Wednesday on Holley 10.  She tells me that her port is drawing and flushing properly.  She tells me that she feels the mass is smaller and softer in the breast.  She denies any new breast masses, skin changes, nipple changes, nipple discharge.  She denies any headache, bone pain, belly pain, cough, changes in vision or gait.  Her genetics returned as negative for mutation.  She had staging CT chest abdomen pelvis April 3, 2020 that was negative for metastatic disease.  Ovarian lesions were seen and a recommendation was made for transvaginal ultrasound.    Her most recent  imaging is a left breast ultrasound in radiology today: Previous measurements 3.2 x 1.9 x 2.3.  Today 3.0 x 1.9 x 2.2.  Stable no significant change BI-RADS 6.    Interval History:  In the interim, Stacy took her last dose of Taxol on July 22.  She tells me that the mass is hard for her to feel.  She tells me her port has been functioning well.  She has seen Dr. Davidson in the interim.  She denies any belly pain bone pain cough, changes in vision or gait.  Her most recent imaging includes the following:  [facility] bilateral breast MRI August 12, 2020: Scattered fibroglandular densities.  Middle one third left breast 11:00, upper inner quadrant there is a metallic clip mini cork-shaped within an irregular enhancing mass that measures 3.3 x 2.1 x 2.2 cm.  Previously it measured 4.4 x 3.7 x 3.7 cm.  There is been interval development of central hypointense enhancement suggestive of tumor necrosis.  There is been interval reduction in surrounding tissue vascularity.  No evidence for left axillary or internal mammary adenopathy.  No abnormal areas of enhancement in the right breast.     Impression interval partial response to neoadjuvant chemotherapy.     LEFT DX mammogram [facility] dated 8-12-20 showed fatty replaced parenchyma.  In the posterior one third upper inner quadrant left breast is a metallic clip within an irregular mass.  The mass measures 2.8 x 2.2 cm compared to previous measurements of 3.3 x 3.0.  No axillary adenopathy.  Impression interval decrease in size of the biopsy-proven malignancy in the left breast in the posterior one third upper inner quadrant.  Consistent with interval partial response to neoadjuvant chemotherapy.     Requested Dr Brush comment on the calcifications extent on this mammogram     [facility] left diagnostic mammogram addendum: There has been partial interval resolution of microcalcifications associated with malignancy in the left breast since the prior examination.  Fewer  microcalcifications remain in the majority are located at the medial margin of the malignancy with fewer microcalcifications along the lateral margin.  The calcifications that remain are located within the stated dimensions of the malignancy.  No suspicious microcalcifications greater than 5 mm away from the margin of the malignancy are visualized.       Her weight is stable.  Review of Systems:  Review of Systems   Constitutional: Positive for diaphoresis and fatigue. Negative for appetite change, chills, fever and unexpected weight change (10 lb wt loss).   HENT:   Negative for hearing loss, lump/mass, mouth sores, nosebleeds, sore throat, tinnitus, trouble swallowing and voice change.    Eyes: Negative for eye problems and icterus.   Respiratory: Negative for chest tightness, cough, hemoptysis, shortness of breath and wheezing.    Cardiovascular: Negative for chest pain, leg swelling and palpitations.   Gastrointestinal: Negative for abdominal distention, abdominal pain, blood in stool, constipation, diarrhea, nausea, rectal pain and vomiting.   Endocrine: Positive for hot flashes.   Genitourinary: Positive for frequency, nocturia and vaginal discharge. Negative for bladder incontinence, difficulty urinating, dyspareunia, dysuria, hematuria, menstrual problem, pelvic pain and vaginal bleeding.    Musculoskeletal: Positive for back pain and myalgias. Negative for arthralgias, flank pain, gait problem, neck pain and neck stiffness.   Skin: Negative for itching, rash and wound.   Neurological: Negative for dizziness, extremity weakness, gait problem, headaches, light-headedness, numbness, seizures and speech difficulty.   Hematological: Negative for adenopathy. Does not bruise/bleed easily.   Psychiatric/Behavioral: Positive for decreased concentration, depression and sleep disturbance. Negative for confusion and suicidal ideas. The patient is nervous/anxious.         Past Medical and Surgical History:  Breast  Biopsy History:  Patient had not had a breast biopsy prior to her cancer diagnosis.  Breast Cancer HIstory:  Patient does not have a past medical history of breast cancer.  Breast Operations, and year:  None   Obstetric/Gynecologic History:  Age menstrual periods began: 12  Patient is premenopausal, first day of last period: approx 3/23/2020  Number of pregnancies: 2  Number of live births: 2  Number of abortions or miscarriages: 0  Age of delivery of first child: 22  Patient did not breast feed.  Length of time taking birth control pills: 6-8 yrs   Patient has never taken hormone replacement  Patient has both ovaries and uterus.     Past Surgical History:   Procedure Laterality Date   • APPENDECTOMY     • BREAST BIOPSY     • VENOUS ACCESS DEVICE (PORT) INSERTION Right 4/7/2020    Procedure: RIGHT port placement.;  Surgeon: Sunni Barber MD;  Location: [facility];  Service: General;  Laterality: Right;     Past Medical History:   Diagnosis Date   • Anemia    • Anesthesia complication     PATIENT STATED THAT AFTER HER APPENDIX WAS TAKEN OUT, HER  TOLD HER THAT IN RECOVERY THEY HAD TO PUT HER ON A VENTILATOR BECAUSE HER HEART RATE SLOWED AND HER BREATHING SLOWED.   • Anxiety    • Breast cancer (CMS/HCC)     Left   • Depression    • Drug therapy    • HA (headache)    • History of UTI        Prior Hospitalizations, other than for surgery or childbirth, and year:  None     Social History     Socioeconomic History   • Marital status:      Spouse name: Garrte   • Number of children: 2   • Years of education: Not on file   • Highest education level: Not on file   Occupational History   • Occupation: G[employer]h     Employer: [employer]   Tobacco Use   • Smoking status: Never Smoker   • Smokeless tobacco: Never Used   Substance and Sexual Activity   • Alcohol use: Yes     Comment: RARE   • Drug use: Never   • Sexual activity: Defer     Patient is .  Patient is employed full time with the  "following occupation:    Patient drinks 1-2 servings of caffeine per day.    Family History:  Family History   Problem Relation Age of Onset   • Breast cancer Paternal Aunt         GREAT PATERNAL AUNT    • Breast cancer Cousin 35        PATERNAL COUSIN    • Malig Hyperthermia Neg Hx        Vital Signs:  /77   Pulse 86   Temp 97.3 °F (36.3 °C)   Ht 160 cm (63\")   Wt 88.5 kg (195 lb)   LMP  (LMP Unknown)   SpO2 97%   Breastfeeding No   BMI 34.54 kg/m²      Medications:    Current Outpatient Medications:   •  melatonin 5 MG tablet tablet, Take 5 mg by mouth., Disp: , Rfl:   •  sertraline (ZOLOFT) 100 MG tablet, Take 200 mg by mouth Daily., Disp: , Rfl:   •  dexamethasone (DECADRON) 0.5 MG tablet, DISSOLVE 1 TAB IN 10 ML WATER, HOLD IN MOUTH 5 MIN, THEN SPIT. DO 4X DAILY., Disp: , Rfl:   •  OLANZapine (zyPREXA) 10 MG tablet, Take 10 mg by mouth., Disp: , Rfl:   •  ondansetron (ZOFRAN) 8 MG tablet, Take 8 mg by mouth., Disp: , Rfl:   •  promethazine (PHENERGAN) 25 MG tablet, Take 12.5-25 mg by mouth., Disp: , Rfl:      Allergies:  No Known Allergies    Physical Examination:  /77   Pulse 86   Temp 97.3 °F (36.3 °C)   Ht 160 cm (63\")   Wt 88.5 kg (195 lb)   LMP  (LMP Unknown)   SpO2 97%   Breastfeeding No   BMI 34.54 kg/m²   General Appearance:  Patient is in no distress.  She is well kept and has an obese build.   Psychiatric:  Patient with appropriate mood and affect. Alert and oriented to self, time, and place.    Breast, RIGHT:  large sized,44DD,  symmetric with the contralateral side.  Breast skin is without erythema, edema, rashes.  There are no visible abnormalities upon inspection during the arm-raising maneuver or with hands on hips in the sitting position. There is no nipple retraction, discharge or nipple/areolar skin changes.There are no masses palpable in the sitting or supine positions.    Breast, LEFT:  large sized, 44DD, symmetric with the contralateral side.  Breast " skin is without erythema, edema, rashes.  There are no visible abnormalities upon inspection during the arm-raising maneuver or with hands on hips in the sitting position. There is no nipple retraction, discharge or nipple/areolar skin changes. There is a palpable thickening with underlying nodularity that is vague on her exam at the site of known malignancy- (initialy 4 x 3.5 cm) - palpable at the left breast 1130, 9.5 cm from the nipple location. There are no overlying skin changes.  It is nontender.  There are no other masses palpable in the sitting or supine positions.    Lymphatic:  There is no axillary, cervical, infraclavicular, or supraclavicular adenopathy bilaterally.  Eyes:  Pupils are round and reactive to light.  Cardiovascular:  Heart rate and rhythm are regular.  Respiratory:  Lungs are clear bilaterally with no crackles or wheezes in any lung field.  Gastrointestinal:  Abdomen is soft, nondistended, and nontender.     Musculoskeletal:  Good strength in all 4 extremities.   There is good range of motion in both shoulders.    Skin:  No new skin lesions or rashes on the skin excluding the breast (see breast exam above).        Imagin2020  [facility]   Radiology  Stacyalida Grewal  SONOGRAM LEFT LTD  MAMM DX BILAT  Clinical history: Left breast mass, upper outer quadrant.   Comparison: None, baseline exam.   Breasts are composed of scattered fibroglandular tissue. 3 cm high density speculated mass 11-12:00 left breast middle one third. Associated pleomorphic microcalcifications and prominent architectural distortion. Corresponds to the palpable abnormality. Right breast is unremarkable.   Left Breast US: 3.2 x 2.3 x 1.9 cm mass 11-12:00. Recommend ultrasound-guided core needle biopsy.  BIRADS: 5    2020  [facility]  Radiololgy  Stacy Grewal  BILATERAL BREAST MRI  Middle third left breast 11:00 upper inner quadrant there is an irregular mass 4.4 cm in anterior-posterior, 3.7 cm  in the superior-inferior 3.1 cm in the medial-lateral. A metallic clip is located along the anterolateral margin of the mass.  No evidence for left axillary or internal mammary chain adenopathy.  No findings suspicious for malignancy in the right breast.    April 3, 2020 CT abdomen and pelvis at [facility]: No evidence for metastatic disease or adenopathy.  Nonspecific bilateral ovarian lesions.  However the right ovarian dominant lesion appears larger than prior recommend pelvic ultrasound.  CT chest April 3, 2020 [facility]: 3 cm left breast mass consistent with known malignancy.  Axillary lymph nodes containing fat without discrete enlargement.  No mediastinal or hilar adenopathy.  Bilateral lower lobe atelectasis without infiltrate mass or pleural effusion.  No evidence for metastatic lesion or adenopathy.     [facility] left breast ultrasound June 1, 2020: 3.0 x 1.9 x 2.2 cm lesion down from 3.2 x 1.9 x 2.3 cm lesion.  BI-RADS 6    [facility] bilateral breast MRI August 12, 2020: Scattered fibroglandular densities.  Middle one third left breast 11:00, upper inner quadrant there is a metallic clip mini cork-shaped within an irregular enhancing mass that measures 3.3 x 2.1 x 2.2 cm.  Previously it measured 4.4 x 3.7 x 3.7 cm.  There is been interval development of central hypointense enhancement suggestive of tumor necrosis.  There is been interval reduction in surrounding tissue vascularity.  No evidence for left axillary or internal mammary adenopathy.  No abnormal areas of enhancement in the right breast.     Impression interval partial response to neoadjuvant chemotherapy.     LEFT DX mammogram [facility] dated 8-12-20 showed fatty replaced parenchyma.  In the posterior one third upper inner quadrant left breast is a metallic clip within an irregular mass.  The mass measures 2.8 x 2.2 cm compared to previous measurements of 3.3 x 3.0.  No axillary adenopathy.  Impression interval decrease in size of  the biopsy-proven malignancy in the left breast in the posterior one third upper inner quadrant.  Consistent with interval partial response to neoadjuvant chemotherapy.     Requested Dr Brush comment on the calcifications extent on this mammogram     [facility] left diagnostic mammogram addendum: There has been partial interval resolution of microcalcifications associated with malignancy in the left breast since the prior examination.  Fewer microcalcifications remain in the majority are located at the medial margin of the malignancy with fewer microcalcifications along the lateral margin.  The calcifications that remain are located within the stated dimensions of the malignancy.  No suspicious microcalcifications greater than 5 mm away from the margin of the malignancy are visualized.           Pathology:    03/04/2020  [facility]   Radiology  Stacy Grewal  US/BX BREAST LEFT  MAMM EDX UNILATERAL LEFT  Left breast. 4 core needle biopsy specimen 9 gauge device. A marker clip was then deployed within the biopsy bed.  Diagnostic Left Mammo: Postprocedure diagnostic left craniocaudal view confirms marker clip placement within the biopsied mass.    03/04/2020  [facility]  Stacy Grewal  Solid architecture, high nuclear grade, and infrequent mitotic figures.  Invasive ductal carcinoma, grade 2.    03/13/2020  [facility]   Pathology  Stacy Grewal  OUTSIDE PATH CONSULT  Left, Core Biopsy:  F. Invasive mammary carcinoma, no special type, grade II/III (tubules =3, nuclei =2, mitoses =2) 14 mm.  G. ER - 60%; NV - 10%; HER2 (Score 1+)  H. Ki-67 - 20%  I. No lymphvascular invasion.  J. No in situ component identified.        Note from Dr Davidson dated 7-16-20- plan for left oncoplastic closure and right reduction    Note from Dr Good Chew at [facility]- 8-6-20-  Patient completed her scheduled chemotherapy July 22, 2020.      Procedures:      Assessment:   Diagnosis Plan   1.  Grief reaction     2. Malignant neoplasm of upper-inner quadrant of left female breast, unspecified estrogen receptor status (CMS/HCC)     3. FH: breast cancer     4. History of frequent urinary tract infections        1-  Grief with significant denial and anger related to cancer diagnosis.  Possible complicated by marital issues/discord, young age.    2-  Left breast 1130, 9.5 cm from the nipple, 4 cm on exam, 4.4 cm on MRI, 3.2 cm on ultrasound.  Invasive mammary carcinoma, no special type, intermediate grade, 3, 2, 2, 1.4 cm largest in a core, estrogen 60, progesterone 10, HER-2/mary 1+, Ki-67 is 20%.  No lymphovascular invasion, no duct carcinoma site 2,  Clinical stage T2N0 stage IIa.    Dr Meade-  Oncotype 38  DDAC from April 15, 2020 through May 27, 2020.  - set to start her Taxol next Wednesday on [place] 10.- last dose July 22.    -staging  CT chest abdomen pelvis April 3, 2020 that was negative for metastatic disease.  Ovarian lesions were seen and a recommendation was made for transvaginal ultrasound. Dr Meade plans to do this at a later date    -port placed by me. Functioning well.    - post neoadjuvant MRI showed tumor reduction from 4.4 cm to 3.3 cm. Calcifications remaining are within 5mm of the index lesion- wont need special bracketing    3-  Paternal cousin age 35, paternal great aunt uncertain age  [facility] breast and gyn panel returned 3-30-20 as negative for mutation      4-  Postcoital UTIs    Plan:  Stacy and I reviewed her interval history, imaging, imaging reports, treatment, and examination together today.  On examination the mass is softer and smaller than it initial presentation.     There is still nodularity present and the MRI shows significant residual.      We will plan to leave her port at the time of surgery.    We discussed that for early stage breast cancer, there are 2 options for locoregional treatment that have equivalent survival: total mastectomy versus lumpectomy and  radiation, either with sentinel node biopsy.   She is interested in breast conservation.   We discussed the option of ipsilateral oncoplastic closure with contralateral  mastopexy, and she is interested in this.  She has seen Dr Davidson and has many additional questions for her- we will arrange for a preoperative visit.    We discussed the following procedure:  Left breast ultrasound-guided lumpectomy, left axillary sentinel lymph node biopsy,  possible axillary lymph node dissection, possible oncoplastics.      She understands the risks of lumpectomy including bleeding, infection, seroma and 25% chance of positive margins. She understands the risks of  sentinel node biopsy, including 7% chance of lymphedema, injury to nerves or vessels in the axilla, seroma. We discussed that we would review her pathology from her sentinel node procedure in consideration of a complete axillary dissection, after her procedure.     NCCN guidelines have been followed.    We prescribed her EMLA cream and tegederm for her sentinel node procedure.      She told me that she had a negative first impression at Dr Davidson office, mostly because she was unhappy with how the photos looked after a reduction.  We discussed that Dr Davidson does very nice work and that it may be grief in accepting the reality of having to have breast changes due to her cancer. She knows that she doesn't want a mastectomy, she does not like the amount of incisions with a reduction, and really just doesnt want cancer at all.    We will arrange for her to talk with Flor dotson.     We also offered a second opinion and will keep that on with Dr Owens.  We discussed that if she didn't feel that she was in a better place emotionally that we may need to postpone her surgery.               Sunni Barber MD      We have spent 50 minutes in visit today, 40 in face to face .    Next Appointment:  Return for surgery.      EMR Dragon/transcription  disclaimer:    Much of this encounter note is an electronic transcription/translocation of spoken language to printed text.  The electronic translation of spoken language may permit erroneous, or at times, nonsensical words or phrases to be inadvertently transcribed.  Although I have reviewed the note from such areas, some may still exist.

## 2020-08-20 NOTE — PROGRESS NOTES
Chief Complaint: Stacy Grewal is a 40 y.o. female who was seen in consultation at the request of Brenda Sheikh MD  for newly diagnosed breast cancer and a followup visit    History of Present Illness:  Patient presents with newly diagnosed breast cancer, LEFT breast.  She noted a left breast mass in January.  She does not feel that this is gotten any larger.  She noticed no associated pain with this.  No associated skin changes.  She noticed it first when looking in the mirror at the gym.      She noted no other new masses, skin changes, nipple discharge, nipple changes prior to her most recent imaging.  Her most recent imaging includes the followin2020  [facility]   Radiology  Stacy Grewal  SONOGRAM LEFT LTD  MAMM DX BILAT  Clinical history: Left breast mass, upper outer quadrant.   Comparison: None, baseline exam.   Breasts are composed of scattered fibroglandular tissue. 3 cm high density speculated mass 11-12:00 left breast middle one third. Associated pleomorphic microcalcifications and prominent architectural distortion. Corresponds to the palpable abnormality. Right breast is unremarkable.   Left Breast US: 3.2 x 2.3 x 1.9 cm mass 11-12:00. Recommend ultrasound-guided core needle biopsy.  BIRADS: 5      She had a biopsy on the following day that showed:     2020  [facility]   Hazel Grewal  US/BX BREAST LEFT  MAMM EDX UNILATERAL LEFT  Left breast. 4 core needle biopsy specimen 9 gauge device. A marker clip was then deployed within the biopsy bed.  Diagnostic Left Mammo: Postprocedure diagnostic left craniocaudal view confirms marker clip placement within the biopsied mass.    2020  [facility]   Radiology  Stacy Grewal  Solid architecture, high nuclear grade, and infrequent mitotic figures.  Invasive ductal carcinoma, grade 2.    2020  [facility]   Pathology  Stacy Grewal  OUTSIDE PATH CONSULT  Left, Core Biopsy:  A. Invasive mammary  carcinoma, no special type, grade II/III (tubules =3, nuclei =2, mitoses =2) 14 mm.  B. ER - 60%; UT - 10%; HER2 (Score 1+)  C. Ki-67 - 20%  D. No lymphvascular invasion.  E. No in situ component identified.        We then arranged for a breast MRI:    03/18/2020  [facility]  Radiololgy  Stacy Grewal  BILATERAL BREAST MRI  Middle third left breast 11:00 upper inner quadrant there is an irregular mass 4.4 cm in anterior-posterior, 3.7 cm in the superior-inferior 3.1 cm in the medial-lateral. A metallic clip is located along the anterolateral margin of the mass.  No evidence for left axillary or internal mammary chain adenopathy.  No findings suspicious for malignancy in the right breast.      She has not had a breast biopsy in the past.  She has her uterus and ovaries, is premenopausal, and takes no hormones.  Her family history includes the following: She has 1 daughter, 5 sisters, 4 maternal aunts, one paternal aunt, she has a paternal cousin who had breast cancer in her mid 30s, she has a paternal great aunt who had breast cancer.      In the interim, Stacy took DDAC from April 15, 2020 through May 27, 2020.  She is set to start her Taxol next Wednesday on Holley 10.  She tells me that her port is drawing and flushing properly.  She tells me that she feels the mass is smaller and softer in the breast.  She denies any new breast masses, skin changes, nipple changes, nipple discharge.  She denies any headache, bone pain, belly pain, cough, changes in vision or gait.  Her genetics returned as negative for mutation.  She had staging CT chest abdomen pelvis April 3, 2020 that was negative for metastatic disease.  Ovarian lesions were seen and a recommendation was made for transvaginal ultrasound.    Her most recent imaging is a left breast ultrasound in radiology today: Previous measurements 3.2 x 1.9 x 2.3.  Today 3.0 x 1.9 x 2.2.  Stable no significant change BI-RADS 6.    Interval History:  In the interim,  Stacy took her last dose of Taxol on July 22.  She tells me that the mass is hard for her to feel.  She tells me her port has been functioning well.  She has seen Dr. Davidson in the interim.  She denies any belly pain bone pain cough, changes in vision or gait.  Her most recent imaging includes the following:  [facility] bilateral breast MRI August 12, 2020: Scattered fibroglandular densities.  Middle one third left breast 11:00, upper inner quadrant there is a metallic clip mini cork-shaped within an irregular enhancing mass that measures 3.3 x 2.1 x 2.2 cm.  Previously it measured 4.4 x 3.7 x 3.7 cm.  There is been interval development of central hypointense enhancement suggestive of tumor necrosis.  There is been interval reduction in surrounding tissue vascularity.  No evidence for left axillary or internal mammary adenopathy.  No abnormal areas of enhancement in the right breast.     Impression interval partial response to neoadjuvant chemotherapy.     LEFT DX mammogram [facility] dated 8-12-20 showed fatty replaced parenchyma.  In the posterior one third upper inner quadrant left breast is a metallic clip within an irregular mass.  The mass measures 2.8 x 2.2 cm compared to previous measurements of 3.3 x 3.0.  No axillary adenopathy.  Impression interval decrease in size of the biopsy-proven malignancy in the left breast in the posterior one third upper inner quadrant.  Consistent with interval partial response to neoadjuvant chemotherapy.     Requested Dr Brush comment on the calcifications extent on this mammogram     [facility] left diagnostic mammogram addendum: There has been partial interval resolution of microcalcifications associated with malignancy in the left breast since the prior examination.  Fewer microcalcifications remain in the majority are located at the medial margin of the malignancy with fewer microcalcifications along the lateral margin.  The calcifications that remain are located  within the stated dimensions of the malignancy.  No suspicious microcalcifications greater than 5 mm away from the margin of the malignancy are visualized.       Her weight is stable.  Review of Systems:  Review of Systems   Constitutional: Positive for diaphoresis and fatigue. Negative for appetite change, chills, fever and unexpected weight change (10 lb wt loss).   HENT:   Negative for hearing loss, lump/mass, mouth sores, nosebleeds, sore throat, tinnitus, trouble swallowing and voice change.    Eyes: Negative for eye problems and icterus.   Respiratory: Negative for chest tightness, cough, hemoptysis, shortness of breath and wheezing.    Cardiovascular: Negative for chest pain, leg swelling and palpitations.   Gastrointestinal: Negative for abdominal distention, abdominal pain, blood in stool, constipation, diarrhea, nausea, rectal pain and vomiting.   Endocrine: Positive for hot flashes.   Genitourinary: Positive for frequency, nocturia and vaginal discharge. Negative for bladder incontinence, difficulty urinating, dyspareunia, dysuria, hematuria, menstrual problem, pelvic pain and vaginal bleeding.    Musculoskeletal: Positive for back pain and myalgias. Negative for arthralgias, flank pain, gait problem, neck pain and neck stiffness.   Skin: Negative for itching, rash and wound.   Neurological: Negative for dizziness, extremity weakness, gait problem, headaches, light-headedness, numbness, seizures and speech difficulty.   Hematological: Negative for adenopathy. Does not bruise/bleed easily.   Psychiatric/Behavioral: Positive for decreased concentration, depression and sleep disturbance. Negative for confusion and suicidal ideas. The patient is nervous/anxious.         Past Medical and Surgical History:  Breast Biopsy History:  Patient had not had a breast biopsy prior to her cancer diagnosis.  Breast Cancer HIstory:  Patient does not have a past medical history of breast cancer.  Breast Operations, and  year:  None   Obstetric/Gynecologic History:  Age menstrual periods began: 12  Patient is premenopausal, first day of last period: approx 3/23/2020  Number of pregnancies: 2  Number of live births: 2  Number of abortions or miscarriages: 0  Age of delivery of first child: 22  Patient did not breast feed.  Length of time taking birth control pills: 6-8 yrs   Patient has never taken hormone replacement  Patient has both ovaries and uterus.     Past Surgical History:   Procedure Laterality Date   • APPENDECTOMY     • BREAST BIOPSY     • VENOUS ACCESS DEVICE (PORT) INSERTION Right 4/7/2020    Procedure: RIGHT port placement.;  Surgeon: Sunni Barber MD;  Location: [facility];  Service: General;  Laterality: Right;     Past Medical History:   Diagnosis Date   • Anemia    • Anesthesia complication     PATIENT STATED THAT AFTER HER APPENDIX WAS TAKEN OUT, HER  TOLD HER THAT IN RECOVERY THEY HAD TO PUT HER ON A VENTILATOR BECAUSE HER HEART RATE SLOWED AND HER BREATHING SLOWED.   • Anxiety    • Breast cancer (CMS/HCC)     Left   • Depression    • Drug therapy    • HA (headache)    • History of UTI        Prior Hospitalizations, other than for surgery or childbirth, and year:  None     Social History     Socioeconomic History   • Marital status:      Spouse name: Garret   • Number of children: 2   • Years of education: Not on file   • Highest education level: Not on file   Occupational History   • Occupation: [employer]     Employer: [employer]   Tobacco Use   • Smoking status: Never Smoker   • Smokeless tobacco: Never Used   Substance and Sexual Activity   • Alcohol use: Yes     Comment: RARE   • Drug use: Never   • Sexual activity: Defer     Patient is .  Patient is employed full time with the following occupation: [employer]   Patient drinks 1-2 servings of caffeine per day.    Family History:  Family History   Problem Relation Age of Onset   • Breast cancer Paternal Aunt         GREAT  "PATERNAL AUNT    • Breast cancer Cousin 35        PATERNAL COUSIN    • Malig Hyperthermia Neg Hx        Vital Signs:  /77   Pulse 86   Temp 97.3 °F (36.3 °C)   Ht 160 cm (63\")   Wt 88.5 kg (195 lb)   LMP  (LMP Unknown)   SpO2 97%   Breastfeeding No   BMI 34.54 kg/m²      Medications:    Current Outpatient Medications:   •  melatonin 5 MG tablet tablet, Take 5 mg by mouth., Disp: , Rfl:   •  sertraline (ZOLOFT) 100 MG tablet, Take 200 mg by mouth Daily., Disp: , Rfl:   •  dexamethasone (DECADRON) 0.5 MG tablet, DISSOLVE 1 TAB IN 10 ML WATER, HOLD IN MOUTH 5 MIN, THEN SPIT. DO 4X DAILY., Disp: , Rfl:   •  OLANZapine (zyPREXA) 10 MG tablet, Take 10 mg by mouth., Disp: , Rfl:   •  ondansetron (ZOFRAN) 8 MG tablet, Take 8 mg by mouth., Disp: , Rfl:   •  promethazine (PHENERGAN) 25 MG tablet, Take 12.5-25 mg by mouth., Disp: , Rfl:      Allergies:  No Known Allergies    Physical Examination:  /77   Pulse 86   Temp 97.3 °F (36.3 °C)   Ht 160 cm (63\")   Wt 88.5 kg (195 lb)   LMP  (LMP Unknown)   SpO2 97%   Breastfeeding No   BMI 34.54 kg/m²   General Appearance:  Patient is in no distress.  She is well kept and has an obese build.   Psychiatric:  Patient with appropriate mood and affect. Alert and oriented to self, time, and place.    Breast, RIGHT:  large sized,44DD,  symmetric with the contralateral side.  Breast skin is without erythema, edema, rashes.  There are no visible abnormalities upon inspection during the arm-raising maneuver or with hands on hips in the sitting position. There is no nipple retraction, discharge or nipple/areolar skin changes.There are no masses palpable in the sitting or supine positions.    Breast, LEFT:  large sized, 44DD, symmetric with the contralateral side.  Breast skin is without erythema, edema, rashes.  There are no visible abnormalities upon inspection during the arm-raising maneuver or with hands on hips in the sitting position. There is no nipple " retraction, discharge or nipple/areolar skin changes. There is a palpable thickening with underlying nodularity that is vague on her exam at the site of known malignancy- (initialy 4 x 3.5 cm) - palpable at the left breast 1130, 9.5 cm from the nipple location. There are no overlying skin changes.  It is nontender.  There are no other masses palpable in the sitting or supine positions.    Lymphatic:  There is no axillary, cervical, infraclavicular, or supraclavicular adenopathy bilaterally.  Eyes:  Pupils are round and reactive to light.  Cardiovascular:  Heart rate and rhythm are regular.  Respiratory:  Lungs are clear bilaterally with no crackles or wheezes in any lung field.  Gastrointestinal:  Abdomen is soft, nondistended, and nontender.     Musculoskeletal:  Good strength in all 4 extremities.   There is good range of motion in both shoulders.    Skin:  No new skin lesions or rashes on the skin excluding the breast (see breast exam above).        Imagin2020  [facility]   Radiology  [facility]  SONOGRAM LEFT LTD  MAMM DX BILAT  Clinical history: Left breast mass, upper outer quadrant.   Comparison: None, baseline exam.   Breasts are composed of scattered fibroglandular tissue. 3 cm high density speculated mass 11-12:00 left breast middle one third. Associated pleomorphic microcalcifications and prominent architectural distortion. Corresponds to the palpable abnormality. Right breast is unremarkable.   Left Breast US: 3.2 x 2.3 x 1.9 cm mass 11-12:00. Recommend ultrasound-guided core needle biopsy.  BIRADS: 5    2020  [facility]  Radiololgy  Stacy Couderay  BILATERAL BREAST MRI  Middle third left breast 11:00 upper inner quadrant there is an irregular mass 4.4 cm in anterior-posterior, 3.7 cm in the superior-inferior 3.1 cm in the medial-lateral. A metallic clip is located along the anterolateral margin of the mass.  No evidence for left axillary or internal mammary chain  adenopathy.  No findings suspicious for malignancy in the right breast.    April 3, 2020 CT [facility]: No evidence for metastatic disease or adenopathy.  Nonspecific bilateral ovarian lesions.  However the right ovarian dominant lesion appears larger than prior recommend pelvic ultrasound.  CT chest April 3, 2020 [facility]: 3 cm left breast mass consistent with known malignancy.  Axillary lymph nodes containing fat without discrete enlargement.  No mediastinal or hilar adenopathy.  Bilateral lower lobe atelectasis without infiltrate mass or pleural effusion.  No evidence for metastatic lesion or adenopathy.     [facility] left breast ultrasound June 1, 2020: 3.0 x 1.9 x 2.2 cm lesion down from 3.2 x 1.9 x 2.3 cm lesion.  BI-RADS 6    [facility] bilateral breast MRI August 12, 2020: Scattered fibroglandular densities.  Middle one third left breast 11:00, upper inner quadrant there is a metallic clip mini cork-shaped within an irregular enhancing mass that measures 3.3 x 2.1 x 2.2 cm.  Previously it measured 4.4 x 3.7 x 3.7 cm.  There is been interval development of central hypointense enhancement suggestive of tumor necrosis.  There is been interval reduction in surrounding tissue vascularity.  No evidence for left axillary or internal mammary adenopathy.  No abnormal areas of enhancement in the right breast.     Impression interval partial response to neoadjuvant chemotherapy.     LEFT DX mammogram [facility] dated 8-12-20 showed fatty replaced parenchyma.  In the posterior one third upper inner quadrant left breast is a metallic clip within an irregular mass.  The mass measures 2.8 x 2.2 cm compared to previous measurements of 3.3 x 3.0.  No axillary adenopathy.  Impression interval decrease in size of the biopsy-proven malignancy in the left breast in the posterior one third upper inner quadrant.  Consistent with interval partial response to neoadjuvant chemotherapy.     Requested  Dr Brush comment on the calcifications extent on this mammogram     [facility] left diagnostic mammogram addendum: There has been partial interval resolution of microcalcifications associated with malignancy in the left breast since the prior examination.  Fewer microcalcifications remain in the majority are located at the medial margin of the malignancy with fewer microcalcifications along the lateral margin.  The calcifications that remain are located within the stated dimensions of the malignancy.  No suspicious microcalcifications greater than 5 mm away from the margin of the malignancy are visualized.           Pathology:    03/04/2020  [facility]   Radiology  Stacy Grewal  US/BX BREAST LEFT  MAMM EDX UNILATERAL LEFT  Left breast. 4 core needle biopsy specimen 9 gauge device. A marker clip was then deployed within the biopsy bed.  Diagnostic Left Mammo: Postprocedure diagnostic left craniocaudal view confirms marker clip placement within the biopsied mass.    03/04/2020  [facility]  Stacy Grewal  Solid architecture, high nuclear grade, and infrequent mitotic figures.  Invasive ductal carcinoma, grade 2.    03/13/2020  [facility]   Pathology  Stacy Grewal  OUTSIDE PATH CONSULT  Left, Core Biopsy:  F. Invasive mammary carcinoma, no special type, grade II/III (tubules =3, nuclei =2, mitoses =2) 14 mm.  G. ER - 60%; WA - 10%; HER2 (Score 1+)  H. Ki-67 - 20%  I. No lymphvascular invasion.  J. No in situ component identified.        Note from Dr Davidson dated 7-16-20- plan for left oncoplastic closure and right reduction    Note from Dr Good Chew at [facility]- 8-6-20-  Patient completed her scheduled chemotherapy July 22, 2020.      Procedures:      Assessment:   Diagnosis Plan   1. Grief reaction     2. Malignant neoplasm of upper-inner quadrant of left female breast, unspecified estrogen receptor status (CMS/HCC)     3. FH: breast cancer     4. History of  frequent urinary tract infections        1-  Grief with significant denial and anger related to cancer diagnosis.  Possible complicated by marital issues/discord, young age.    2-  Left breast 1130, 9.5 cm from the nipple, 4 cm on exam, 4.4 cm on MRI, 3.2 cm on ultrasound.  Invasive mammary carcinoma, no special type, intermediate grade, 3, 2, 2, 1.4 cm largest in a core, estrogen 60, progesterone 10, HER-2/mary 1+, Ki-67 is 20%.  No lymphovascular invasion, no duct carcinoma site 2,  Clinical stage T2N0 stage IIa.    Dr Meade-  Oncotype 38  DDAC from April 15, 2020 through May 27, 2020.  - set to start her Taxol next Wednesday on Holley 10.- last dose July 22.    -staging  CT chest abdomen pelvis April 3, 2020 that was negative for metastatic disease.  Ovarian lesions were seen and a recommendation was made for transvaginal ultrasound. Dr Meade plans to do this at a later date    -port placed by me. Functioning well.    - post neoadjuvant MRI showed tumor reduction from 4.4 cm to 3.3 cm. Calcifications remaining are within 5mm of the index lesion- wont need special bracketing    3-  Paternal cousin age 35, paternal great aunt uncertain age  [facility] breast and gyn panel returned 3-30-20 as negative for mutation      4-  Postcoital UTIs    Plan:  Stacy and MATT reviewed her interval history, imaging, imaging reports, treatment, and examination together today.  On examination the mass is softer and smaller than it initial presentation.     There is still nodularity present and the MRI shows significant residual.      We will plan to leave her port at the time of surgery.    We discussed that for early stage breast cancer, there are 2 options for locoregional treatment that have equivalent survival: total mastectomy versus lumpectomy and radiation, either with sentinel node biopsy.   She is interested in breast conservation.   We discussed the option of ipsilateral oncoplastic closure with contralateral  mastopexy, and  she is interested in this.  She has seen Dr Davidson and has many additional questions for her- we will arrange for a preoperative visit.    We discussed the following procedure:  Left breast ultrasound-guided lumpectomy, left axillary sentinel lymph node biopsy,  possible axillary lymph node dissection, possible oncoplastic's.      She understands the risks of lumpectomy including bleeding, infection, seroma and 25% chance of positive margins. She understands the risks of  sentinel node biopsy, including 7% chance of lymphedema, injury to nerves or vessels in the axilla, seroma. We discussed that we would review her pathology from her sentinel node procedure in consideration of a complete axillary dissection, after her procedure.     NCCN guidelines have been followed.    We prescribed her EMLA cream and tegederm for her sentinel node procedure.      She told me that she had a negative first impression at Dr Davidson office, mostly because she was unhappy with how the photos looked after a reduction.  We discussed that Dr Davidson does very nice work and that it may be grief in accepting the reality of having to have breast changes due to her cancer. She knows that she doesn't want a mastectomy, she does not like the amount of incisions with a reduction, and really just doesnt want cancer at all.    We will arrange for her to talk with Flor dotson.     We also offered a second opinion and will keep that on with Dr Owens.  We discussed that if she didn't feel that she was in a better place emotionally that we may need to postpone her surgery.               Sunni Barber MD      We have spent 50 minutes in visit today, 40 in face to face .    Next Appointment:  Return for surgery.      EMR Dragon/transcription disclaimer:    Much of this encounter note is an electronic transcription/translocation of spoken language to printed text.  The electronic translation of spoken language may permit erroneous,  or at times, nonsensical words or phrases to be inadvertently transcribed.  Although I have reviewed the note from such areas, some may still exist.

## 2020-08-20 NOTE — TELEPHONE ENCOUNTER
Request for patient to see Behavioral Oncology.   Ambar will call patient tomorrow and help us get her set up.

## 2020-08-21 ENCOUNTER — TRANSCRIBE ORDERS (OUTPATIENT)
Dept: SURGERY | Facility: CLINIC | Age: 40
End: 2020-08-21

## 2020-08-21 ENCOUNTER — TELEPHONE (OUTPATIENT)
Dept: OTHER | Facility: HOSPITAL | Age: 40
End: 2020-08-21

## 2020-08-21 ENCOUNTER — TELEPHONE (OUTPATIENT)
Dept: SURGERY | Facility: CLINIC | Age: 40
End: 2020-08-21

## 2020-08-21 DIAGNOSIS — C50.212 MALIGNANT NEOPLASM OF UPPER-INNER QUADRANT OF LEFT FEMALE BREAST, UNSPECIFIED ESTROGEN RECEPTOR STATUS (HCC): Primary | ICD-10-CM

## 2020-08-21 NOTE — TELEPHONE ENCOUNTER
Called Stacy to follow up on her consult with Dr. Barber and Dr. Davidson. She stated she had a good appointment with Dr. Davidson today and was feeling better about the surgery in general. She did not have any questions or concerns to discuss with me today but was open to seeing out supportive oncology clinic. Appointment made with Rosa Monday Aug 24th at 10am. She works from home and would prefer a virtual session if possible. She has my contact number if any needs arise.

## 2020-08-24 ENCOUNTER — DOCUMENTATION (OUTPATIENT)
Dept: OTHER | Facility: HOSPITAL | Age: 40
End: 2020-08-24

## 2020-08-24 ENCOUNTER — APPOINTMENT (OUTPATIENT)
Dept: PREADMISSION TESTING | Facility: HOSPITAL | Age: 40
End: 2020-08-24

## 2020-08-24 VITALS
OXYGEN SATURATION: 97 % | HEIGHT: 63 IN | RESPIRATION RATE: 16 BRPM | HEART RATE: 83 BPM | DIASTOLIC BLOOD PRESSURE: 82 MMHG | BODY MASS INDEX: 34.2 KG/M2 | SYSTOLIC BLOOD PRESSURE: 116 MMHG | TEMPERATURE: 98.3 F | WEIGHT: 193 LBS

## 2020-08-24 DIAGNOSIS — C50.212 MALIGNANT NEOPLASM OF UPPER-INNER QUADRANT OF LEFT FEMALE BREAST, UNSPECIFIED ESTROGEN RECEPTOR STATUS (HCC): ICD-10-CM

## 2020-08-24 LAB
ALBUMIN SERPL-MCNC: 4.3 G/DL (ref 3.5–5.2)
ALBUMIN/GLOB SERPL: 1.9 G/DL
ALP SERPL-CCNC: 122 U/L (ref 39–117)
ALT SERPL W P-5'-P-CCNC: 30 U/L (ref 1–33)
ANION GAP SERPL CALCULATED.3IONS-SCNC: 11.5 MMOL/L (ref 5–15)
AST SERPL-CCNC: 33 U/L (ref 1–32)
BASOPHILS # BLD AUTO: 0.03 10*3/MM3 (ref 0–0.2)
BASOPHILS NFR BLD AUTO: 0.6 % (ref 0–1.5)
BILIRUB SERPL-MCNC: 0.3 MG/DL (ref 0–1.2)
BUN SERPL-MCNC: 9 MG/DL (ref 6–20)
BUN/CREAT SERPL: 11.7 (ref 7–25)
CALCIUM SPEC-SCNC: 9.2 MG/DL (ref 8.6–10.5)
CHLORIDE SERPL-SCNC: 101 MMOL/L (ref 98–107)
CO2 SERPL-SCNC: 24.5 MMOL/L (ref 22–29)
CREAT SERPL-MCNC: 0.77 MG/DL (ref 0.57–1)
DEPRECATED RDW RBC AUTO: 44.2 FL (ref 37–54)
EOSINOPHIL # BLD AUTO: 0.16 10*3/MM3 (ref 0–0.4)
EOSINOPHIL NFR BLD AUTO: 3 % (ref 0.3–6.2)
ERYTHROCYTE [DISTWIDTH] IN BLOOD BY AUTOMATED COUNT: 13.8 % (ref 12.3–15.4)
GFR SERPL CREATININE-BSD FRML MDRD: 84 ML/MIN/1.73
GLOBULIN UR ELPH-MCNC: 2.3 GM/DL
GLUCOSE SERPL-MCNC: 96 MG/DL (ref 65–99)
HCG SERPL QL: NEGATIVE
HCT VFR BLD AUTO: 37.1 % (ref 34–46.6)
HGB BLD-MCNC: 11.8 G/DL (ref 12–15.9)
IMM GRANULOCYTES # BLD AUTO: 0.01 10*3/MM3 (ref 0–0.05)
IMM GRANULOCYTES NFR BLD AUTO: 0.2 % (ref 0–0.5)
LYMPHOCYTES # BLD AUTO: 0.96 10*3/MM3 (ref 0.7–3.1)
LYMPHOCYTES NFR BLD AUTO: 17.8 % (ref 19.6–45.3)
MCH RBC QN AUTO: 27.6 PG (ref 26.6–33)
MCHC RBC AUTO-ENTMCNC: 31.8 G/DL (ref 31.5–35.7)
MCV RBC AUTO: 86.9 FL (ref 79–97)
MONOCYTES # BLD AUTO: 0.33 10*3/MM3 (ref 0.1–0.9)
MONOCYTES NFR BLD AUTO: 6.1 % (ref 5–12)
NEUTROPHILS NFR BLD AUTO: 3.91 10*3/MM3 (ref 1.7–7)
NEUTROPHILS NFR BLD AUTO: 72.3 % (ref 42.7–76)
NRBC BLD AUTO-RTO: 0 /100 WBC (ref 0–0.2)
PLATELET # BLD AUTO: 222 10*3/MM3 (ref 140–450)
PMV BLD AUTO: 10.2 FL (ref 6–12)
POTASSIUM SERPL-SCNC: 4.1 MMOL/L (ref 3.5–5.2)
PROT SERPL-MCNC: 6.6 G/DL (ref 6–8.5)
RBC # BLD AUTO: 4.27 10*6/MM3 (ref 3.77–5.28)
SODIUM SERPL-SCNC: 137 MMOL/L (ref 136–145)
WBC # BLD AUTO: 5.4 10*3/MM3 (ref 3.4–10.8)

## 2020-08-24 PROCEDURE — U0004 COV-19 TEST NON-CDC HGH THRU: HCPCS | Performed by: NURSE PRACTITIONER

## 2020-08-24 PROCEDURE — C9803 HOPD COVID-19 SPEC COLLECT: HCPCS | Performed by: NURSE PRACTITIONER

## 2020-08-24 PROCEDURE — 36415 COLL VENOUS BLD VENIPUNCTURE: CPT

## 2020-08-24 PROCEDURE — 84703 CHORIONIC GONADOTROPIN ASSAY: CPT | Performed by: SURGERY

## 2020-08-24 PROCEDURE — 80053 COMPREHEN METABOLIC PANEL: CPT | Performed by: SURGERY

## 2020-08-24 PROCEDURE — 85025 COMPLETE CBC W/AUTO DIFF WBC: CPT | Performed by: SURGERY

## 2020-08-24 NOTE — DISCHARGE INSTRUCTIONS
Take the following medications the morning of surgery: NONE      If you are on prescription narcotic pain medication to control your pain you may also take that medication the morning of surgery.    General Instructions:  • Do not eat solid food after midnight the night before surgery.  • You may drink clear liquids day of surgery but must stop at least one hour before your hospital arrival time.  • It is beneficial for you to have a clear drink that contains carbohydrates the day of surgery.  We suggest a 12 to 20 ounce bottle of Gatorade or Powerade for non-diabetic patients or a 12 to 20 ounce bottle of G2 or Powerade Zero for diabetic patients.     Clear liquids are liquids you can see through.  Nothing red in color.     Plain water                               Sports drinks  Sodas                                   Gelatin (Jell-O)  Fruit juices without pulp such as white grape juice and apple juice  Popsicles that contain no fruit or yogurt  Tea or coffee (no cream or milk added)  Gatorade / Powerade  G2 / Powerade Zero    • Bring any papers given to you in the doctor’s office.  • Wear clean comfortable clothes.  • Do not wear contact lenses, false eyelashes or make-up.  Bring a case for your glasses.   • Remove all piercings.  Leave jewelry and any other valuables at home.  • The Pre-Admission Testing nurse will instruct you to bring medications if unable to obtain an accurate list in Pre-Admission Testing.            Preventing a Surgical Site Infection:  • For 2 to 3 days before surgery, avoid shaving with a razor because the razor can irritate skin and make it easier to develop an infection.    • Any areas of open skin can increase the risk of a post-operative wound infection by allowing bacteria to enter and travel throughout the body.  Notify your surgeon if you have any skin wounds / rashes even if it is not near the expected surgical site.  The area will need assessed to determine if surgery should be  delayed until it is healed.  • The night prior to surgery shower using a fresh bar of anti-bacterial soap (such as Dial) and clean washcloth.  Sleep in a clean bed with clean clothing.  Do not allow pets to sleep with you.  • Shower on the morning of surgery using a fresh bar of anti-bacterial soap (such as Dial) and clean washcloth.  Dry with a clean towel and dress in clean clothing.  • Ask your surgeon if you will be receiving antibiotics prior to surgery.  • Make sure you, your family, and all healthcare providers clean their hands with soap and water or an alcohol based hand  before caring for you or your wound.    Day of surgery: 8/26/2020.  MAIN OR. ARRIVAL TIME 530 AM  Your arrival time is approximately two hours before your scheduled surgery time.  Upon arrival, a Pre-op nurse and Anesthesiologist will review your health history, obtain vital signs, and answer questions you may have.  The only belongings needed at this time will be a list of your home medications and if applicable your C-PAP/BI-PAP machine.  If you are staying overnight your family can leave the rest of your belongings in the car and bring them to your room later.  A Pre-op nurse will start an IV and you may receive medication in preparation for surgery, including something to help you relax.  Your family will be able to see you in the Pre-op area.  Two visitors at a time will be allowed in the Pre-op room.  While you are in surgery your family should notify the waiting room  if they leave the waiting room area and provide a contact phone number.    Please be aware that surgery does come with discomfort.  We want to make every effort to control your discomfort so please discuss any uncontrolled symptoms with your nurse.   Your doctor will most likely have prescribed pain medications.      If you are going home after surgery you will receive individualized written care instructions before being discharged.  A responsible  adult must drive you to and from the hospital on the day of your surgery and stay with you for 24 hours.        If you have any questions please call Pre-Admission Testing at [phone].  Deductibles and co-payments are collected on the day of service. Please be prepared to pay the required co-pay, deductible or deposit on the day of service as defined by your plan.    Patient Education for Self-Quarantine Process    Following your COVID testing, we strongly recommend that you do not leave your home after you have been tested for COVID except to get medical care. This includes not going to work, school or to public areas.  If this is not possible for you to do please limit your activities to only required outings.  Be sure to wear a mask when you are with other people, practice social distancing and wash your hands frequently.      The following items provide additional details to keep you safe.  • Wash your hands with soap and water frequently for at least 20 seconds.   • Avoid touching your eyes, nose and mouth with unwashed hands.  • Do not share anything - utensils, towels, food from the same bowl.   • Have your own utensils, drinking glass, dishes, towels and bedding.   • Do not have visitors.   • Do use FaceTime to stay in touch with family and friends.  • You should stay in a specific room away from others if possible.   • Stay at least 6 feet away from others in the home if you cannot have a dedicated room to yourself.   • Do not snuggle with your pet. While the CDC says there is no evidence that pets can spread COVID-19 or be infected from humans, it is probably best to avoid “petting, snuggling, being kissed or licked and sharing food (during self-quarantine)”, according to the CDC.   • Sanitize household surfaces daily. Include all high touch areas (door handles, light switches, phones, countertops, etc.)  • Do not share a bathroom with others, if possible.   • Wear a mask around others in your home if  you are unable to stay in a separate room or 6 feet apart. If  you are unable to wear a mask, have your family member wear a mask if they must be within 6 feet of you.   Call your surgeon immediately if you experience any of the following symptoms:  • Sore Throat  • Shortness of Breath or difficulty breathing  • Cough  • Chills  • Body soreness or muscle pain  • Headache  • Fever  • New loss of taste or smell  • Do not arrive for your surgery ill.  Your procedure will need to be rescheduled to another time.  You will need to call your physician before the day of surgery to avoid any unnecessary exposure to hospital staff as well as other patients.

## 2020-08-24 NOTE — PROGRESS NOTES
Supportive Oncology Services  Initial Evaluation    Patient ID: Stacy Grewal is a 40 y.o. female referred to the Supportive Oncology Services (SOS) clinic for initial consultation at the request of Dr. Sunni Barber. Chart reviewed.  Patient with left breast cancer.  S/P neoadjuvant chemotherapy - her oncologist is with [facility].  Patient to have lumpectomy and reconstruction surgery on Wednesday 8/26.  Patient with increased frustration and anger over the loss of her hair, weight gain and false / misinformation that she was told at Dr. Davidson's office.  Patient upset with the RN that examined her and provided information regarding to her surgery, post op care and scarring.  Patient with pre-existing anxiety and insomnia prior to her breast cancer diagnosis.  Patient states that she is quick to anger, always had trouble relaxing and trouble with focus and concentration when she has unscheduled time. Patient states that she is able to stay on task and focus while working, but then she struggles during unscheduled time.   Patient appears to have low coping capacity and also hasn't given the time or space to process everything that has happened since diagnosis.  Patient is frustrated with the potential outcome of surgery and reconstruction.  Patient frustrated that she went through chemo and lost her hair and the tumor only shrunk a 1/2 centimeter.   Patient with identity crisis over hair loss and weight gain.     Target Symptoms: Anxiety, Anger and irritability.  Pre-Existing Insomnia.     PHQ9 Total Score: 5  DANIEL 7 Total Score: 10    Mental Status Exam: Assessment was done via telephone so OSW did not see patient.  Patient A&O x 3.  She was cooperative and talkative during assessment, Speech was normal.  Mood was euthymic. NO SI/ HI.       Medical/ Psychiatric History  Cancer Diagnosis: breast   Current Treatment: s/p chemotherapy -  Surgery planned for Wednesday 8/26     Psychiatric History:   Anxiety -  Did see a Psychiatrist about 3 years ago - Dr. Bess Johnson  Current Medications and prescriber: Zoloft 100mg - 2x a day.  -  Her PCP now prescribes   Past Medications: Zyprexa -  But patient does not remember taking but it is on her med list.     Currently seeing Mental Health Professional? No   Medication/ Counseling Interest? Yes    Social History  Support Community: limited to spouse and children.  Patient is .  She has two children ages 18 and 14.  She is close to her dad.  She has a half sister and half brother.   Substance Use:  Patient reports no history of abuse.     Family History  Psychiatric: paternal grandmother with depression.  Patient has no family history on her mother's side of the family.   Impactful cancer experience: breast cancer in paternal Aunt and Cousin.     Plan of Care    OSW met with patient via telephone to complete psychosocial assessment for SOS clinic.  Patient with new diagnosis of left breast cancer in March 2020.  She completed neoadjuvant chemotherapy.  Patient to undergo lumpectomy with reconstruction.  Patient with heightened anger and frustration over false information that was given to her at the plastic surgeon's office by one of the RN's in regards to the surgery and recovery.  Patient states that she has always struggled with anger/ anxiety and depression and sought out help by a Psychiatrist , Dr. Bess Johnson about 3 years ago.  She was placed on Zoloft and patient reports that this med has helped.  She states that she has struggled with insomnia for years.  She usually has more difficulty with staying asleep than falling asleep.  Will present case to SOS Team at Thursdays Clinical Case Conferencing.  Patient has seen therapist in the past, but is currently not seeing anyone.  Patient unsure she wants to do therapy at this time.  Discussed other emotional supports such as [employer] and [employer].  Patient not interested in this at this  time.     Will follow up.  Rosa Echavarria, DANIELLEW, CSW, OSW-C

## 2020-08-25 LAB
REF LAB TEST METHOD: NORMAL
SARS-COV-2 RNA RESP QL NAA+PROBE: NOT DETECTED

## 2020-08-26 ENCOUNTER — ANESTHESIA EVENT (OUTPATIENT)
Dept: PERIOP | Facility: HOSPITAL | Age: 40
End: 2020-08-26

## 2020-08-26 ENCOUNTER — HOSPITAL ENCOUNTER (OUTPATIENT)
Facility: HOSPITAL | Age: 40
Setting detail: HOSPITAL OUTPATIENT SURGERY
Discharge: HOME | End: 2020-08-26
Attending: PLASTIC SURGERY | Admitting: SURGERY

## 2020-08-26 ENCOUNTER — ANESTHESIA (OUTPATIENT)
Dept: PERIOP | Facility: HOSPITAL | Age: 40
End: 2020-08-26

## 2020-08-26 ENCOUNTER — HOSPITAL ENCOUNTER (OUTPATIENT)
Dept: NUCLEAR MEDICINE | Facility: HOSPITAL | Age: 40
Discharge: HOME | End: 2020-08-26

## 2020-08-26 ENCOUNTER — APPOINTMENT (OUTPATIENT)
Dept: GENERAL RADIOLOGY | Facility: HOSPITAL | Age: 40
End: 2020-08-26

## 2020-08-26 VITALS
DIASTOLIC BLOOD PRESSURE: 80 MMHG | BODY MASS INDEX: 34.55 KG/M2 | HEIGHT: 63 IN | OXYGEN SATURATION: 96 % | HEART RATE: 90 BPM | WEIGHT: 195 LBS | TEMPERATURE: 98.1 F | SYSTOLIC BLOOD PRESSURE: 124 MMHG | RESPIRATION RATE: 16 BRPM

## 2020-08-26 DIAGNOSIS — C50.212 MALIGNANT NEOPLASM OF UPPER-INNER QUADRANT OF LEFT FEMALE BREAST, UNSPECIFIED ESTROGEN RECEPTOR STATUS (HCC): ICD-10-CM

## 2020-08-26 PROCEDURE — 25010000002 ONDANSETRON PER 1 MG: Performed by: NURSE ANESTHETIST, CERTIFIED REGISTERED

## 2020-08-26 PROCEDURE — 25010000002 DEXAMETHASONE PER 1 MG: Performed by: NURSE ANESTHETIST, CERTIFIED REGISTERED

## 2020-08-26 PROCEDURE — 0 TECHNETIUM FILTERED SULFUR COLLOID: Performed by: SURGERY

## 2020-08-26 PROCEDURE — 88360 TUMOR IMMUNOHISTOCHEM/MANUAL: CPT | Performed by: SURGERY

## 2020-08-26 PROCEDURE — 38525 BIOPSY/REMOVAL LYMPH NODES: CPT | Performed by: SURGERY

## 2020-08-26 PROCEDURE — 25010000003 LIDOCAINE 1 % SOLUTION: Performed by: SURGERY

## 2020-08-26 PROCEDURE — 25010000002 PROPOFOL 10 MG/ML EMULSION: Performed by: NURSE ANESTHETIST, CERTIFIED REGISTERED

## 2020-08-26 PROCEDURE — 88307 TISSUE EXAM BY PATHOLOGIST: CPT | Performed by: SURGERY

## 2020-08-26 PROCEDURE — 25010000003 CEFAZOLIN IN DEXTROSE 2-4 GM/100ML-% SOLUTION: Performed by: SURGERY

## 2020-08-26 PROCEDURE — 76998 US GUIDE INTRAOP: CPT | Performed by: SURGERY

## 2020-08-26 PROCEDURE — 25010000002 FENTANYL CITRATE (PF) 100 MCG/2ML SOLUTION: Performed by: NURSE ANESTHETIST, CERTIFIED REGISTERED

## 2020-08-26 PROCEDURE — 25010000002 SUCCINYLCHOLINE PER 20 MG: Performed by: NURSE ANESTHETIST, CERTIFIED REGISTERED

## 2020-08-26 PROCEDURE — 88332 PATH CONSLTJ SURG EA ADD BLK: CPT | Performed by: SURGERY

## 2020-08-26 PROCEDURE — 88342 IMHCHEM/IMCYTCHM 1ST ANTB: CPT | Performed by: SURGERY

## 2020-08-26 PROCEDURE — 88341 IMHCHEM/IMCYTCHM EA ADD ANTB: CPT | Performed by: SURGERY

## 2020-08-26 PROCEDURE — 78195 LYMPH SYSTEM IMAGING: CPT | Performed by: SURGERY

## 2020-08-26 PROCEDURE — 25010000002 HYDROMORPHONE PER 4 MG: Performed by: NURSE ANESTHETIST, CERTIFIED REGISTERED

## 2020-08-26 PROCEDURE — A9541 TC99M SULFUR COLLOID: HCPCS | Performed by: SURGERY

## 2020-08-26 PROCEDURE — 25010000002 PHENYLEPHRINE PER 1 ML: Performed by: NURSE ANESTHETIST, CERTIFIED REGISTERED

## 2020-08-26 PROCEDURE — 88331 PATH CONSLTJ SURG 1 BLK 1SPC: CPT | Performed by: SURGERY

## 2020-08-26 PROCEDURE — 76098 X-RAY EXAM SURGICAL SPECIMEN: CPT

## 2020-08-26 PROCEDURE — 19301 PARTIAL MASTECTOMY: CPT | Performed by: SURGERY

## 2020-08-26 PROCEDURE — 25010000002 GENTAMICIN PER 80 MG: Performed by: PLASTIC SURGERY

## 2020-08-26 PROCEDURE — 25010000002 MIDAZOLAM PER 1 MG: Performed by: ANESTHESIOLOGY

## 2020-08-26 PROCEDURE — 88305 TISSUE EXAM BY PATHOLOGIST: CPT | Performed by: SURGERY

## 2020-08-26 PROCEDURE — 38792 RA TRACER ID OF SENTINL NODE: CPT

## 2020-08-26 DEVICE — CLIP LIGAT VASC HORIZON TI SM/WD RD 6CT: Type: IMPLANTABLE DEVICE | Site: BREAST | Status: FUNCTIONAL

## 2020-08-26 DEVICE — CLIP LIGAT VASC HORIZON TI MD BLU 6CT: Type: IMPLANTABLE DEVICE | Site: BREAST | Status: FUNCTIONAL

## 2020-08-26 RX ORDER — FLUMAZENIL 0.1 MG/ML
0.2 INJECTION INTRAVENOUS AS NEEDED
Status: DISCONTINUED | OUTPATIENT
Start: 2020-08-26 | End: 2020-08-26 | Stop reason: HOSPADM

## 2020-08-26 RX ORDER — PROMETHAZINE HYDROCHLORIDE 25 MG/1
25 SUPPOSITORY RECTAL ONCE AS NEEDED
Status: DISCONTINUED | OUTPATIENT
Start: 2020-08-26 | End: 2020-08-26 | Stop reason: HOSPADM

## 2020-08-26 RX ORDER — FAMOTIDINE 10 MG/ML
20 INJECTION, SOLUTION INTRAVENOUS ONCE
Status: COMPLETED | OUTPATIENT
Start: 2020-08-26 | End: 2020-08-26

## 2020-08-26 RX ORDER — HYDROCODONE BITARTRATE AND ACETAMINOPHEN 7.5; 325 MG/1; MG/1
1 TABLET ORAL ONCE AS NEEDED
Status: COMPLETED | OUTPATIENT
Start: 2020-08-26 | End: 2020-08-26

## 2020-08-26 RX ORDER — PROMETHAZINE HYDROCHLORIDE 25 MG/1
25 TABLET ORAL ONCE AS NEEDED
Status: DISCONTINUED | OUTPATIENT
Start: 2020-08-26 | End: 2020-08-26 | Stop reason: HOSPADM

## 2020-08-26 RX ORDER — EPHEDRINE SULFATE 50 MG/ML
5 INJECTION, SOLUTION INTRAVENOUS ONCE AS NEEDED
Status: DISCONTINUED | OUTPATIENT
Start: 2020-08-26 | End: 2020-08-26 | Stop reason: HOSPADM

## 2020-08-26 RX ORDER — LABETALOL HYDROCHLORIDE 5 MG/ML
5 INJECTION, SOLUTION INTRAVENOUS
Status: DISCONTINUED | OUTPATIENT
Start: 2020-08-26 | End: 2020-08-26 | Stop reason: HOSPADM

## 2020-08-26 RX ORDER — FENTANYL CITRATE 50 UG/ML
50 INJECTION, SOLUTION INTRAMUSCULAR; INTRAVENOUS
Status: DISCONTINUED | OUTPATIENT
Start: 2020-08-26 | End: 2020-08-26 | Stop reason: HOSPADM

## 2020-08-26 RX ORDER — LIDOCAINE HYDROCHLORIDE 20 MG/ML
INJECTION, SOLUTION INFILTRATION; PERINEURAL AS NEEDED
Status: DISCONTINUED | OUTPATIENT
Start: 2020-08-26 | End: 2020-08-26 | Stop reason: SURG

## 2020-08-26 RX ORDER — FENTANYL CITRATE 50 UG/ML
INJECTION, SOLUTION INTRAMUSCULAR; INTRAVENOUS AS NEEDED
Status: DISCONTINUED | OUTPATIENT
Start: 2020-08-26 | End: 2020-08-26 | Stop reason: SURG

## 2020-08-26 RX ORDER — HYDROMORPHONE HCL 110MG/55ML
PATIENT CONTROLLED ANALGESIA SYRINGE INTRAVENOUS AS NEEDED
Status: DISCONTINUED | OUTPATIENT
Start: 2020-08-26 | End: 2020-08-26 | Stop reason: SURG

## 2020-08-26 RX ORDER — NALOXONE HCL 0.4 MG/ML
0.2 VIAL (ML) INJECTION AS NEEDED
Status: DISCONTINUED | OUTPATIENT
Start: 2020-08-26 | End: 2020-08-26 | Stop reason: HOSPADM

## 2020-08-26 RX ORDER — MIDAZOLAM HYDROCHLORIDE 1 MG/ML
1 INJECTION INTRAMUSCULAR; INTRAVENOUS
Status: COMPLETED | OUTPATIENT
Start: 2020-08-26 | End: 2020-08-26

## 2020-08-26 RX ORDER — ONDANSETRON 2 MG/ML
4 INJECTION INTRAMUSCULAR; INTRAVENOUS ONCE AS NEEDED
Status: DISCONTINUED | OUTPATIENT
Start: 2020-08-26 | End: 2020-08-26 | Stop reason: HOSPADM

## 2020-08-26 RX ORDER — MAGNESIUM HYDROXIDE 1200 MG/15ML
LIQUID ORAL AS NEEDED
Status: DISCONTINUED | OUTPATIENT
Start: 2020-08-26 | End: 2020-08-26 | Stop reason: HOSPADM

## 2020-08-26 RX ORDER — PROPOFOL 10 MG/ML
VIAL (ML) INTRAVENOUS AS NEEDED
Status: DISCONTINUED | OUTPATIENT
Start: 2020-08-26 | End: 2020-08-26 | Stop reason: SURG

## 2020-08-26 RX ORDER — LIDOCAINE HYDROCHLORIDE 10 MG/ML
0.5 INJECTION, SOLUTION EPIDURAL; INFILTRATION; INTRACAUDAL; PERINEURAL ONCE AS NEEDED
Status: DISCONTINUED | OUTPATIENT
Start: 2020-08-26 | End: 2020-08-26 | Stop reason: HOSPADM

## 2020-08-26 RX ORDER — HYDROMORPHONE HYDROCHLORIDE 1 MG/ML
0.5 INJECTION, SOLUTION INTRAMUSCULAR; INTRAVENOUS; SUBCUTANEOUS
Status: DISCONTINUED | OUTPATIENT
Start: 2020-08-26 | End: 2020-08-26 | Stop reason: HOSPADM

## 2020-08-26 RX ORDER — DIAZEPAM 5 MG/1
10 TABLET ORAL ONCE
Status: COMPLETED | OUTPATIENT
Start: 2020-08-26 | End: 2020-08-26

## 2020-08-26 RX ORDER — CEFAZOLIN SODIUM 2 G/100ML
2 INJECTION, SOLUTION INTRAVENOUS ONCE
Status: COMPLETED | OUTPATIENT
Start: 2020-08-26 | End: 2020-08-26

## 2020-08-26 RX ORDER — LIDOCAINE HYDROCHLORIDE 10 MG/ML
INJECTION, SOLUTION INFILTRATION; PERINEURAL AS NEEDED
Status: DISCONTINUED | OUTPATIENT
Start: 2020-08-26 | End: 2020-08-26 | Stop reason: HOSPADM

## 2020-08-26 RX ORDER — DIPHENHYDRAMINE HYDROCHLORIDE 50 MG/ML
12.5 INJECTION INTRAMUSCULAR; INTRAVENOUS
Status: DISCONTINUED | OUTPATIENT
Start: 2020-08-26 | End: 2020-08-26 | Stop reason: HOSPADM

## 2020-08-26 RX ORDER — DIPHENHYDRAMINE HCL 25 MG
25 CAPSULE ORAL
Status: DISCONTINUED | OUTPATIENT
Start: 2020-08-26 | End: 2020-08-26 | Stop reason: HOSPADM

## 2020-08-26 RX ORDER — SUCCINYLCHOLINE CHLORIDE 20 MG/ML
INJECTION INTRAMUSCULAR; INTRAVENOUS AS NEEDED
Status: DISCONTINUED | OUTPATIENT
Start: 2020-08-26 | End: 2020-08-26 | Stop reason: SURG

## 2020-08-26 RX ORDER — SODIUM CHLORIDE, SODIUM LACTATE, POTASSIUM CHLORIDE, CALCIUM CHLORIDE 600; 310; 30; 20 MG/100ML; MG/100ML; MG/100ML; MG/100ML
9 INJECTION, SOLUTION INTRAVENOUS CONTINUOUS
Status: DISCONTINUED | OUTPATIENT
Start: 2020-08-26 | End: 2020-08-26 | Stop reason: HOSPADM

## 2020-08-26 RX ORDER — SODIUM CHLORIDE 0.9 % (FLUSH) 0.9 %
3 SYRINGE (ML) INJECTION EVERY 12 HOURS SCHEDULED
Status: DISCONTINUED | OUTPATIENT
Start: 2020-08-26 | End: 2020-08-26 | Stop reason: HOSPADM

## 2020-08-26 RX ORDER — DEXAMETHASONE SODIUM PHOSPHATE 10 MG/ML
INJECTION INTRAMUSCULAR; INTRAVENOUS AS NEEDED
Status: DISCONTINUED | OUTPATIENT
Start: 2020-08-26 | End: 2020-08-26 | Stop reason: SURG

## 2020-08-26 RX ORDER — DIAZEPAM 5 MG/1
5 TABLET ORAL ONCE AS NEEDED
Status: DISCONTINUED | OUTPATIENT
Start: 2020-08-26 | End: 2020-08-26 | Stop reason: HOSPADM

## 2020-08-26 RX ORDER — SODIUM CHLORIDE, SODIUM LACTATE, POTASSIUM CHLORIDE, CALCIUM CHLORIDE 600; 310; 30; 20 MG/100ML; MG/100ML; MG/100ML; MG/100ML
100 INJECTION, SOLUTION INTRAVENOUS CONTINUOUS
Status: DISCONTINUED | OUTPATIENT
Start: 2020-08-26 | End: 2020-08-26 | Stop reason: HOSPADM

## 2020-08-26 RX ORDER — BUPIVACAINE HYDROCHLORIDE 2.5 MG/ML
INJECTION, SOLUTION EPIDURAL; INFILTRATION; INTRACAUDAL AS NEEDED
Status: DISCONTINUED | OUTPATIENT
Start: 2020-08-26 | End: 2020-08-26 | Stop reason: HOSPADM

## 2020-08-26 RX ORDER — HYDROCODONE BITARTRATE AND ACETAMINOPHEN 5; 325 MG/1; MG/1
1 TABLET ORAL ONCE AS NEEDED
Status: DISCONTINUED | OUTPATIENT
Start: 2020-08-26 | End: 2020-08-26 | Stop reason: HOSPADM

## 2020-08-26 RX ORDER — HYDRALAZINE HYDROCHLORIDE 20 MG/ML
5 INJECTION INTRAMUSCULAR; INTRAVENOUS
Status: DISCONTINUED | OUTPATIENT
Start: 2020-08-26 | End: 2020-08-26 | Stop reason: HOSPADM

## 2020-08-26 RX ORDER — OXYCODONE AND ACETAMINOPHEN 7.5; 325 MG/1; MG/1
1 TABLET ORAL ONCE AS NEEDED
Status: DISCONTINUED | OUTPATIENT
Start: 2020-08-26 | End: 2020-08-26 | Stop reason: HOSPADM

## 2020-08-26 RX ORDER — ROCURONIUM BROMIDE 10 MG/ML
INJECTION, SOLUTION INTRAVENOUS AS NEEDED
Status: DISCONTINUED | OUTPATIENT
Start: 2020-08-26 | End: 2020-08-26 | Stop reason: SURG

## 2020-08-26 RX ORDER — ONDANSETRON 2 MG/ML
INJECTION INTRAMUSCULAR; INTRAVENOUS AS NEEDED
Status: DISCONTINUED | OUTPATIENT
Start: 2020-08-26 | End: 2020-08-26 | Stop reason: SURG

## 2020-08-26 RX ORDER — SODIUM CHLORIDE 0.9 % (FLUSH) 0.9 %
3-10 SYRINGE (ML) INJECTION AS NEEDED
Status: DISCONTINUED | OUTPATIENT
Start: 2020-08-26 | End: 2020-08-26 | Stop reason: HOSPADM

## 2020-08-26 RX ADMIN — TECHNETIUM TC 99M SULFUR COLLOID 1 DOSE: KIT at 07:45

## 2020-08-26 RX ADMIN — FENTANYL CITRATE 50 MCG: 50 INJECTION, SOLUTION INTRAMUSCULAR; INTRAVENOUS at 14:10

## 2020-08-26 RX ADMIN — FENTANYL CITRATE 50 MCG: 50 INJECTION, SOLUTION INTRAMUSCULAR; INTRAVENOUS at 15:16

## 2020-08-26 RX ADMIN — FENTANYL CITRATE 50 MCG: 50 INJECTION INTRAMUSCULAR; INTRAVENOUS at 09:04

## 2020-08-26 RX ADMIN — EPHEDRINE SULFATE 10 MG: 50 INJECTION INTRAMUSCULAR; INTRAVENOUS; SUBCUTANEOUS at 09:35

## 2020-08-26 RX ADMIN — HYDROMORPHONE HYDROCHLORIDE 0.5 MG: 1 INJECTION, SOLUTION INTRAMUSCULAR; INTRAVENOUS; SUBCUTANEOUS at 14:19

## 2020-08-26 RX ADMIN — SODIUM CHLORIDE, POTASSIUM CHLORIDE, SODIUM LACTATE AND CALCIUM CHLORIDE: 600; 310; 30; 20 INJECTION, SOLUTION INTRAVENOUS at 10:54

## 2020-08-26 RX ADMIN — ROCURONIUM BROMIDE 20 MG: 10 INJECTION INTRAVENOUS at 10:55

## 2020-08-26 RX ADMIN — HYDROMORPHONE HYDROCHLORIDE 0.5 MG: 1 INJECTION, SOLUTION INTRAMUSCULAR; INTRAVENOUS; SUBCUTANEOUS at 15:23

## 2020-08-26 RX ADMIN — HYDROMORPHONE HYDROCHLORIDE 0.5 MG: 2 INJECTION, SOLUTION INTRAMUSCULAR; INTRAVENOUS; SUBCUTANEOUS at 12:31

## 2020-08-26 RX ADMIN — SUCCINYLCHOLINE CHLORIDE 100 MG: 20 INJECTION, SOLUTION INTRAMUSCULAR; INTRAVENOUS; PARENTERAL at 09:04

## 2020-08-26 RX ADMIN — DIAZEPAM 5 MG: 5 TABLET ORAL at 07:19

## 2020-08-26 RX ADMIN — PHENYLEPHRINE HYDROCHLORIDE 100 MCG: 10 INJECTION INTRAVENOUS at 09:12

## 2020-08-26 RX ADMIN — HYDROMORPHONE HYDROCHLORIDE 0.5 MG: 2 INJECTION, SOLUTION INTRAMUSCULAR; INTRAVENOUS; SUBCUTANEOUS at 10:15

## 2020-08-26 RX ADMIN — EPHEDRINE SULFATE 10 MG: 50 INJECTION INTRAMUSCULAR; INTRAVENOUS; SUBCUTANEOUS at 09:29

## 2020-08-26 RX ADMIN — CEFAZOLIN SODIUM 2 G: 2 INJECTION, SOLUTION INTRAVENOUS at 09:08

## 2020-08-26 RX ADMIN — PHENYLEPHRINE HYDROCHLORIDE 100 MCG: 10 INJECTION INTRAVENOUS at 09:17

## 2020-08-26 RX ADMIN — HYDROMORPHONE HYDROCHLORIDE 0.5 MG: 1 INJECTION, SOLUTION INTRAMUSCULAR; INTRAVENOUS; SUBCUTANEOUS at 14:44

## 2020-08-26 RX ADMIN — DEXAMETHASONE SODIUM PHOSPHATE 8 MG: 10 INJECTION INTRAMUSCULAR; INTRAVENOUS at 09:13

## 2020-08-26 RX ADMIN — EPHEDRINE SULFATE 10 MG: 50 INJECTION INTRAMUSCULAR; INTRAVENOUS; SUBCUTANEOUS at 09:41

## 2020-08-26 RX ADMIN — SODIUM CHLORIDE, POTASSIUM CHLORIDE, SODIUM LACTATE AND CALCIUM CHLORIDE 100 ML/HR: 600; 310; 30; 20 INJECTION, SOLUTION INTRAVENOUS at 05:55

## 2020-08-26 RX ADMIN — EPHEDRINE SULFATE 5 MG: 50 INJECTION INTRAMUSCULAR; INTRAVENOUS; SUBCUTANEOUS at 09:57

## 2020-08-26 RX ADMIN — PROPOFOL 200 MG: 10 INJECTION, EMULSION INTRAVENOUS at 09:04

## 2020-08-26 RX ADMIN — FAMOTIDINE 20 MG: 10 INJECTION INTRAVENOUS at 07:55

## 2020-08-26 RX ADMIN — PHENYLEPHRINE HYDROCHLORIDE 100 MCG: 10 INJECTION INTRAVENOUS at 11:13

## 2020-08-26 RX ADMIN — FENTANYL CITRATE 50 MCG: 50 INJECTION INTRAMUSCULAR; INTRAVENOUS at 08:59

## 2020-08-26 RX ADMIN — ONDANSETRON HYDROCHLORIDE 4 MG: 2 SOLUTION INTRAMUSCULAR; INTRAVENOUS at 12:29

## 2020-08-26 RX ADMIN — LIDOCAINE HYDROCHLORIDE 80 MG: 20 INJECTION, SOLUTION INFILTRATION; PERINEURAL at 09:04

## 2020-08-26 RX ADMIN — HYDROCODONE BITARTRATE AND ACETAMINOPHEN 1 TABLET: 7.5; 325 TABLET ORAL at 14:15

## 2020-08-26 RX ADMIN — FENTANYL CITRATE 50 MCG: 50 INJECTION, SOLUTION INTRAMUSCULAR; INTRAVENOUS at 14:24

## 2020-08-26 RX ADMIN — MIDAZOLAM 1 MG: 1 INJECTION INTRAMUSCULAR; INTRAVENOUS at 07:55

## 2020-08-26 RX ADMIN — HYDROMORPHONE HYDROCHLORIDE 0.5 MG: 1 INJECTION, SOLUTION INTRAMUSCULAR; INTRAVENOUS; SUBCUTANEOUS at 18:14

## 2020-08-26 RX ADMIN — MIDAZOLAM 1 MG: 1 INJECTION INTRAMUSCULAR; INTRAVENOUS at 08:23

## 2020-08-26 RX ADMIN — DIAZEPAM 10 MG: 5 TABLET ORAL at 05:48

## 2020-08-26 RX ADMIN — FENTANYL CITRATE 50 MCG: 50 INJECTION, SOLUTION INTRAMUSCULAR; INTRAVENOUS at 15:23

## 2020-08-26 NOTE — ANESTHESIA PROCEDURE NOTES
Airway  Urgency: elective    Date/Time: 8/26/2020 9:05 AM  Airway not difficult    General Information and Staff    Patient location during procedure: OR  CRNA: Adelia Ny CRNA    Indications and Patient Condition  Indications for airway management: airway protection    Preoxygenated: yes  Mask difficulty assessment: 2 - vent by mask + OA or adjuvant +/- NMBA    Final Airway Details  Final airway type: endotracheal airway      Successful airway: ETT  Cuffed: yes   Successful intubation technique: direct laryngoscopy and RSI  Endotracheal tube insertion site: oral  Blade: [place]  Blade size: 2  ETT size (mm): 7.0  Cormack-Lehane Classification: grade I - full view of glottis  Placement verified by: chest auscultation and capnometry   Cuff volume (mL): 8  Number of attempts at approach: 1  Assessment: lips, teeth, and gum same as pre-op and atraumatic intubation    Additional Comments  PreO2 with 100% O2;  FeO2 >85%;  sniff position; easy mask ventilation;  Intubated with no difficultly after eyes taped; Appears atraumatic;  Lips and teeth intact as preop condition;  Airway secured. Connected to ventilator.

## 2020-08-26 NOTE — ANESTHESIA POSTPROCEDURE EVALUATION
Patient: Stacy Grewal    Procedure Summary     Date:  08/26/20 Room / Location:  [facility] OR [address] [place] [address] MAIN OR    Anesthesia Start:  0858 Anesthesia Stop:  1300    Procedures:       Left breast ultrasound-guided lumpectomy, left axillary sentinel lymph node biopsy (Left Breast)      LEFT BREAST ONCOPLASTIC CLOSURE RIGHT BREAST REDUCTION FOR SYMMETERY (Bilateral Chest) Diagnosis:       Malignant neoplasm of upper-inner quadrant of left female breast, unspecified estrogen receptor status (CMS/HCC)      (Malignant neoplasm of upper-inner quadrant of left female breast, unspecified estrogen receptor status (CMS/HCC) [C50.212])    Surgeon:  Sunni Barber MD; Angela Davidson MD Provider:  Tara Wooten MD    Anesthesia Type:  general ASA Status:  2          Anesthesia Type: general    Vitals  Vitals Value Taken Time   /74 8/26/2020  1:30 PM   Temp 37.3 °C (99.1 °F) 8/26/2020 12:53 PM   Pulse 99 8/26/2020  1:36 PM   Resp 16 8/26/2020  1:30 PM   SpO2 92 % 8/26/2020  1:36 PM   Vitals shown include unvalidated device data.        Post Anesthesia Care and Evaluation    Patient location during evaluation: PACU  Patient participation: complete - patient participated  Level of consciousness: awake and alert  Pain management: adequate  Airway patency: patent  Anesthetic complications: No anesthetic complications    Cardiovascular status: acceptable  Respiratory status: acceptable  Hydration status: acceptable    Comments: --------------------            08/26/20               1330     --------------------   BP:       112/74     Pulse:      92       Resp:       16       Temp:                SpO2:      96%      --------------------

## 2020-08-26 NOTE — ANESTHESIA PREPROCEDURE EVALUATION
Anesthesia Evaluation     Patient summary reviewed and Nursing notes reviewed   no history of anesthetic complications:  NPO Solid Status: > 8 hours  NPO Liquid Status: > 2 hours           Airway   Mallampati: II  TM distance: >3 FB  Neck ROM: full  Dental - normal exam     Pulmonary - negative pulmonary ROS and normal exam   Cardiovascular - negative cardio ROS and normal exam  Exercise tolerance: good (4-7 METS)    Rhythm: regular        Neuro/Psych  (+) psychiatric history Anxiety and Depression,     GI/Hepatic/Renal/Endo - negative ROS     Musculoskeletal (-) negative ROS    Abdominal  - normal exam    Bowel sounds: normal.   Substance History - negative use     OB/GYN negative ob/gyn ROS         Other      history of cancer                    Anesthesia Plan    ASA 2     general     intravenous induction     Anesthetic plan, all risks, benefits, and alternatives have been provided, discussed and informed consent has been obtained with: patient.    Plan discussed with CRNA and attending.

## 2020-08-27 ENCOUNTER — TELEPHONE (OUTPATIENT)
Dept: SURGERY | Facility: CLINIC | Age: 40
End: 2020-08-27

## 2020-08-27 NOTE — TELEPHONE ENCOUNTER
Patient calls with concerns of MARINO drains not draining.     Says they are stopped up with clot and not producing any output x 1 day.   Surgery was yesterday with Dr. Barber and Dr. Davidson  Contacted Tracey at Dr. Davidson office. She will notify nurse, nurse to reach out to patient.     Called patient to confirm, mailbox full cannot leave a message.

## 2020-08-28 ENCOUNTER — TELEPHONE (OUTPATIENT)
Dept: SURGERY | Facility: CLINIC | Age: 40
End: 2020-08-28

## 2020-08-28 NOTE — TELEPHONE ENCOUNTER
Pathology from August 26, 2020 left breast ultrasound-guided lumpectomy and sentinel lymph node biopsy with bilateral oncoplastics by Dr. Davidson returned as:  0 of 2 sentinel lymph nodes.  Invasive mammary carcinoma, no special type,apocrine features, 3 cm, high-grade, 3, 3, 2, unifocal, duct carcinoma in situ present, negative for extensive intraductal component.  Duct carcinoma in situ spans an area of 3 cm.  Solid, comedo and cribriform, high-grade.  Necrosis is present.  Margins are uninvolved by invasive and duct carcinoma in situ.  Closest is 6 mm from invasive and 3 mm of superior margin.  This refers to the initial lumpectomy.  Foci of lymphovascular space invasion was noted.  Estrogen 60, progesterone 10, HER-2/mary 1+, Ki-67 20%.  Pathologic stage T2N0. Stage IIA.  All additional margins were benign breast tissue.  I will call and let her know  She has a postop appt with Dr Meade next week.  We will defer to Dr Meade regarding the radiation oncology consultation since this will be local.    She sees Dr Davidson today.      Final Diagnosis   1.  [place] Lymph Node #1, Left Axilla, Excision:               A. One lymph node, negative for metastatic carcinoma (0/1).     2. [place] Lymph Node #2, Left Axilla, Excision:               A. One lymph node, negative for metastatic carcinoma (0/1).     3. Left Breast, Oriented Lumpectomy (99 grams):  INVASIVE MAMMARY CARCINOMA, NO SPECIAL TYPE (INVASIVE                   DUCTAL CARCINOMA) WITH APOCRINE FEATURES.               A. Tumor site:  11:00 o'clock, upper inner quadrant.               B. Tumor size:  30 mm maximally.               C. Histologic grade:  [place] histologic score: III/III (tubules=3, nuclei=3, mitoses=2).               D. Tumor focality:  Single focus.               E. 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                            1. Size (extent) of DCIS:  DCIS is associated with  the periphery of the invasive tumor spanning an area                                of 30 mm from medial to lateral.                             2. Architectural patterns:  Solid, comedo and cribriform.                            3. Nuclear grade:  High (III).                            4. Necrosis:  Present, central comedo necrosis identified.                             5. Tumor extension:  Skeletal muscle uninvolved by tumor (see specimen 8).               F. Margins:  Uninvolved by invasive carcinoma and DCIS.                            1. Invasive carcinoma present 6 mm from the superior margin, 14 mm from the anterior and inferior                                margins, 15 mm from the posterior margin, 20 mm from the lateral margin and 30 mm from the medial                                margin of excision (refers to original lumpectomy specimen 3).                            2. DCIS comes to within 3 mm of the superior margin, 8 mm from the anterior margin, 14 mm from the                                 inferior margin,15 mm from the posterior margin, 20 mm from the lateral margin and 30 mm from the                                 medial margin of excision (refers to original lumpectomy specimen 3).               G. Foci of Lymphovascular space invasion identified.               H. Lymph nodes:  Two benign sentinel lymph nodes (0/2) (specimens 1-2).               I.  Hormone receptor status:  ER positive (60%), VT positive (10%), Her2/mary negative (1+ by IHC), Ki67 20%                    (performed on prior biopsy /[id number]).               J. Pathologic stage: pT2, N(sn)0.       4. Left Breast, Additional Superior Margin, Oriented Excision:                 A. Benign unremarkable fatty breast tissue.               B. New superior margin free by an additional 16 mm.       5.  Left Breast, Additional Lateral Margin, Oriented Excision:                 A. Benign breast tissue with scattered adenosis.                B. New lateral margin free by an additional 16 mm.       6. Left Breast, Additional Inferior Margin, Oriented Excision:               A. Benign unremarkable fatty breast tissue.               B. New inferior margin free by an additional 15 mm.     7. Left Breast, Additional Medial Margin, Oriented Excision:               A. Benign unremarkable fatty breast tissue.               B. New inferior margin free by an additional 12 mm.     8.  Left Breast, Additional Posterior Margin, Oriented Excision:               A. Benign adipose tissue and skeletal muscle.               B. New posterior margin free by an additional 4 mm.     9. Skin, Left Breast, Additional Anterior Margin, Oriented Excision:               A. Benign unremarkable skin and adipose tissue.               B. New anterior margin free by an additional 8 mm.     10. Right Breast, Reduction Mammoplasty (284 grams):               A. Benign skin and breast tissue.               B. No atypical hyperplasia, in-situ nor invasive carcinoma identified.     11. Left Breast, Reduction Mammoplasty (238 grams):               A. Benign skin and breast tissue.               B. No atypical hyperplasia, in-situ nor invasive carcinoma identified.     Swm/kds   Electronically signed by Brenda Emmanuel MD on 8/28/2020 at 1021   Synoptic Checklist   INVASIVE CARCINOMA OF THE BREAST: Resection   Breast.Invasive - 1, 2, 3, 4, 5, 6, 7, 8, 9   8th Edition - Protocol posted: 2/26/2020   SPECIMEN   Procedure  Excision (less than total mastectomy)    Specimen Laterality  Left    TUMOR   Tumor Site  Upper inner quadrant    Histologic Type  Invasive carcinoma of no special type (ductal)    Glandular (Acinar) / Tubular Differentiation  Score 3    Nuclear Pleomorphism  Score 3    Mitotic Rate  Score 2    Overall Grade  Grade 3 (scores of 8 or 9)    Tumor Size  Greatest dimension of largest invasive focus (Millimeters): 30 mm   Tumor Focality  Single focus of invasive  "carcinoma    Ductal Carcinoma In Situ (DCIS)  Present      Negative for extensive intraductal component (EIC)    Size (Extent) of DCIS  Estimated size (extent) of DCIS is at least (Millimeters): 30 mm   Architectural Patterns  [place]      Cribriform      Solid    Nuclear Grade  Grade III (high)    Necrosis  Present, central (expansive \"comedo\" necrosis)    Lobular Carcinoma In Situ (LCIS)  Not identified    Tumor Extent     Lymphovascular Invasion  Present    Dermal Lymphovascular Invasion  No skin present    Microcalcifications  Present in invasive carcinoma      Present in non-neoplastic tissue    Treatment Effect in the Breast  No known presurgical therapy    MARGINS   Invasive Carcinoma Margins  Uninvolved by invasive carcinoma    Distance from Closest Margin (Millimeters)  19 mm   Closest Margin(s)  Posterior    Distance from Other Margins     Anterior Margin (Millimeters)  22 mm   Posterior Margin (Millimeters)  19 mm   Superior Margin (Millimeters)  22 mm   Inferior Margin (Millimeters)  29 mm   Medial Margin (Millimeters)  42 mm   Lateral Margin (Millimeters)  36 mm   DCIS Margins  Uninvolved by DCIS    Distance from Closest Margin (Millimeters)  16 mm   Closest Margin(s)  Anterior    Distance from Other Margins     Anterior Margin (Millimeters)  16 mm   Posterior Margin (Millimeters)  19 mm   Superior Margin (Millimeters)  19 mm   Inferior Margin (Millimeters)  29 mm   Medial Margin (Millimeters)  42 mm   Lateral Margin (Millimeters)  36 mm   LYMPH NODES   Regional Lymph Nodes  Uninvolved by tumor cells    Total Number of Lymph Nodes Examined  2    Number of [place] Nodes Examined  2    PATHOLOGIC STAGE CLASSIFICATION (pTNM, AJCC 8th Edition)   Primary Tumor (pT)  pT2    Regional Lymph Nodes Modifier  (sn): [place] node(s) evaluated.    Regional Lymph Nodes (pN)  pN0    Breast Biomarker Testing Performed on Previous Biopsy     Estrogen Receptor (ER) Status  Positive (greater than 10% of cells demonstrate " nuclear positivity)    Percentage of Cells with Nuclear Positivity  60 %   Breast Biomarker Testing Performed on Previous Biopsy     Progesterone Receptor (PgR) Status  Positive    Percentage of Cells with Nuclear Positivity  10 %   Breast Biomarker Testing Performed on Previous Biopsy     HER2 (by immunohistochemistry)  Negative (Score 1+)    Breast Biomarker Testing Performed on Previous Biopsy     Ki-67 Percentage of Positive Nuclei  20 %   Testing Performed on Case Number  /[id number]    .      Comment    Multiple representative slides from part 3 of the case are shared internally with Livier Franco and Valery, who concur. The margins reported in specimen 3 refer to the original lumpectomy specimen.  The synoptic template incorporates the additional margins of the final margin status.          Alexia/leobardo

## 2020-09-02 ENCOUNTER — TELEPHONE (OUTPATIENT)
Dept: OTHER | Facility: HOSPITAL | Age: 40
End: 2020-09-02

## 2020-09-02 NOTE — TELEPHONE ENCOUNTER
Oncology Social Work   [facility] Oncology Services - [place]     OSW called and spoke to patient to follow up and see how she was doing post surgery.  Patient reports that surgery went well and that she was doing well.  Still offered supportive counseling and community resources. Patient will reach out to OSW if needs arise.    Rosa Echavarria, DANIELLEW, CSW, OSW-C

## 2020-09-15 ENCOUNTER — OFFICE VISIT (OUTPATIENT)
Dept: SURGERY | Facility: CLINIC | Age: 40
End: 2020-09-15

## 2020-09-15 VITALS
DIASTOLIC BLOOD PRESSURE: 79 MMHG | HEIGHT: 63 IN | BODY MASS INDEX: 34.38 KG/M2 | HEART RATE: 88 BPM | SYSTOLIC BLOOD PRESSURE: 113 MMHG | OXYGEN SATURATION: 98 % | WEIGHT: 194 LBS | TEMPERATURE: 98.2 F

## 2020-09-15 DIAGNOSIS — Z87.440 HISTORY OF FREQUENT URINARY TRACT INFECTIONS: ICD-10-CM

## 2020-09-15 DIAGNOSIS — Z80.3 FH: BREAST CANCER: ICD-10-CM

## 2020-09-15 DIAGNOSIS — C50.212 MALIGNANT NEOPLASM OF UPPER-INNER QUADRANT OF LEFT FEMALE BREAST, UNSPECIFIED ESTROGEN RECEPTOR STATUS (HCC): Primary | ICD-10-CM

## 2020-09-15 PROCEDURE — 99024 POSTOP FOLLOW-UP VISIT: CPT | Performed by: SURGERY

## 2020-09-15 NOTE — PROGRESS NOTES
Chief Complaint: Stacy Grewal is a 40 y.o. female who was seen in consultation at the request of Brenda Sheikh MD  for newly diagnosed breast cancer and a postoperative visit    History of Present Illness:  Patient presents with newly diagnosed breast cancer, LEFT breast.  She noted a left breast mass in January.  She does not feel that this is gotten any larger.  She noticed no associated pain with this.  No associated skin changes.  She noticed it first when looking in the mirror at the gym.      She noted no other new masses, skin changes, nipple discharge, nipple changes prior to her most recent imaging.  Her most recent imaging includes the followin2020  [facility]   Radiology  Stacy Grewal  SONOGRAM LEFT LTD  MAMM DX BILAT  Clinical history: Left breast mass, upper outer quadrant.   Comparison: None, baseline exam.   Breasts are composed of scattered fibroglandular tissue. 3 cm high density speculated mass 11-12:00 left breast middle one third. Associated pleomorphic microcalcifications and prominent architectural distortion. Corresponds to the palpable abnormality. Right breast is unremarkable.   Left Breast US: 3.2 x 2.3 x 1.9 cm mass 11-12:00. Recommend ultrasound-guided core needle biopsy.  BIRADS: 5      She had a biopsy on the following day that showed:     2020  [facility]   Hazel Grewal  US/BX BREAST LEFT  MAMM EDX UNILATERAL LEFT  Left breast. 4 core needle biopsy specimen 9 gauge device. A marker clip was then deployed within the biopsy bed.  Diagnostic Left Mammo: Postprocedure diagnostic left craniocaudal view confirms marker clip placement within the biopsied mass.    2020  [facility]   Radiology  Stacy Grewal  Solid architecture, high nuclear grade, and infrequent mitotic figures.  Invasive ductal carcinoma, grade 2.    2020  [facility]   Pathology  Stacy Grewal  OUTSIDE PATH CONSULT  Left, Core Biopsy:  A. Invasive mammary  carcinoma, no special type, grade II/III (tubules =3, nuclei =2, mitoses =2) 14 mm.  B. ER - 60%; [place] - 10%; HER2 (Score 1+)  C. Ki-67 - 20%  D. No lymphvascular invasion.  E. No in situ component identified.        We then arranged for a breast MRI:    03/18/2020  [facility]  Radiololgy  Stacy Grewal  BILATERAL BREAST MRI  Middle third left breast 11:00 upper inner quadrant there is an irregular mass 4.4 cm in anterior-posterior, 3.7 cm in the superior-inferior 3.1 cm in the medial-lateral. A metallic clip is located along the anterolateral margin of the mass.  No evidence for left axillary or internal mammary chain adenopathy.  No findings suspicious for malignancy in the right breast.      She has not had a breast biopsy in the past.  She has her uterus and ovaries, is premenopausal, and takes no hormones.  Her family history includes the following: She has 1 daughter, 5 sisters, 4 maternal aunts, one paternal aunt, she has a paternal cousin who had breast cancer in her mid 30s, she has a paternal great aunt who had breast cancer.      In the interim, Stacy took DDAC from April 15, 2020 through May 27, 2020.  She is set to start her Taxol next Wednesday on [place] 10.  She tells me that her port is drawing and flushing properly.  She tells me that she feels the mass is smaller and softer in the breast.  She denies any new breast masses, skin changes, nipple changes, nipple discharge.  She denies any headache, bone pain, belly pain, cough, changes in vision or gait.  Her genetics returned as negative for mutation.  She had staging CT chest abdomen pelvis April 3, 2020 that was negative for metastatic disease.  Ovarian lesions were seen and a recommendation was made for transvaginal ultrasound.    Her most recent imaging is a left breast ultrasound in radiology today: Previous measurements 3.2 x 1.9 x 2.3.  Today 3.0 x 1.9 x 2.2.  Stable no significant change BI-RADS 6.    Interval History:  In the interim,  Stacy took her last dose of Taxol on July 22.  She tells me that the mass is hard for her to feel.  She tells me her port has been functioning well.  She has seen Dr. Davidson in the interim.  She denies any belly pain bone pain cough, changes in vision or gait.  Her most recent imaging includes the following:  [facility] bilateral breast MRI August 12, 2020: Scattered fibroglandular densities.  Middle one third left breast 11:00, upper inner quadrant there is a metallic clip mini cork-shaped within an irregular enhancing mass that measures 3.3 x 2.1 x 2.2 cm.  Previously it measured 4.4 x 3.7 x 3.7 cm.  There is been interval development of central hypointense enhancement suggestive of tumor necrosis.  There is been interval reduction in surrounding tissue vascularity.  No evidence for left axillary or internal mammary adenopathy.  No abnormal areas of enhancement in the right breast.     Impression interval partial response to neoadjuvant chemotherapy.     LEFT DX mammogram [facility] dated 8-12-20 showed fatty replaced parenchyma.  In the posterior one third upper inner quadrant left breast is a metallic clip within an irregular mass.  The mass measures 2.8 x 2.2 cm compared to previous measurements of 3.3 x 3.0.  No axillary adenopathy.  Impression interval decrease in size of the biopsy-proven malignancy in the left breast in the posterior one third upper inner quadrant.  Consistent with interval partial response to neoadjuvant chemotherapy.     Requested Dr Brush comment on the calcifications extent on this mammogram     [facility] left diagnostic mammogram addendum: There has been partial interval resolution of microcalcifications associated with malignancy in the left breast since the prior examination.  Fewer microcalcifications remain in the majority are located at the medial margin of the malignancy with fewer microcalcifications along the lateral margin.  The calcifications that remain are located  within the stated dimensions of the malignancy.  No suspicious microcalcifications greater than 5 mm away from the margin of the malignancy are visualized.         Interval History:  Pathology from August 26, 2020 left breast ultrasound-guided lumpectomy and sentinel lymph node biopsy with bilateral oncoplastics by Dr. Davidson returned as:  0 of 2 sentinel lymph nodes.  Invasive mammary carcinoma, no special type,apocrine features, 3 cm, high-grade, 3, 3, 2, unifocal, duct carcinoma in situ present, negative for extensive intraductal component.  Duct carcinoma in situ spans an area of 3 cm.  Solid, comedo and cribriform, high-grade.  Necrosis is present.  Margins are uninvolved by invasive and duct carcinoma in situ.  Closest is 6 mm from invasive and 3 mm of superior margin.  This refers to the initial lumpectomy.  Foci of lymphovascular space invasion was noted.  Estrogen 60, progesterone 10, HER-2/mary 1+, Ki-67 20%.  Pathologic stage T2N0. Stage IIA.  All additional margins were benign breast tissue.    She denies redness, warmth, drainage from incisions.    She is here to review.      Review of Systems:  Review of Systems   Constitutional: Positive for diaphoresis and fatigue. Negative for appetite change, chills, fever and unexpected weight change (1 lb wt loss).   HENT:   Negative for hearing loss, lump/mass, mouth sores, nosebleeds, sore throat, tinnitus, trouble swallowing and voice change.    Eyes: Negative for eye problems and icterus.   Respiratory: Negative for chest tightness, cough, hemoptysis, shortness of breath and wheezing.    Cardiovascular: Negative for chest pain, leg swelling and palpitations.   Gastrointestinal: Negative for abdominal distention, abdominal pain, blood in stool, constipation, diarrhea, nausea, rectal pain and vomiting.   Endocrine: Positive for hot flashes.   Genitourinary: Positive for frequency, nocturia and vaginal discharge. Negative for bladder incontinence, difficulty  urinating, dyspareunia, dysuria, hematuria, menstrual problem, pelvic pain and vaginal bleeding.    Musculoskeletal: Positive for back pain and myalgias. Negative for arthralgias, flank pain, gait problem, neck pain and neck stiffness.   Skin: Negative for itching, rash and wound.   Neurological: Negative for dizziness, extremity weakness, gait problem, headaches, light-headedness, numbness, seizures and speech difficulty.   Hematological: Negative for adenopathy. Does not bruise/bleed easily.   Psychiatric/Behavioral: Positive for decreased concentration, depression and sleep disturbance. Negative for confusion and suicidal ideas. The patient is nervous/anxious.         Past Medical and Surgical History:  Breast Biopsy History:  Patient had not had a breast biopsy prior to her cancer diagnosis.  Breast Cancer HIstory:  Patient does not have a past medical history of breast cancer.  Breast Operations, and year:  None   Obstetric/Gynecologic History:  Age menstrual periods began: 12  Patient is premenopausal, first day of last period: approx 3/23/2020  Number of pregnancies: 2  Number of live births: 2  Number of abortions or miscarriages: 0  Age of delivery of first child: 22  Patient did not breast feed.  Length of time taking birth control pills: 6-8 yrs   Patient has never taken hormone replacement  Patient has both ovaries and uterus.     Past Surgical History:   Procedure Laterality Date   • APPENDECTOMY     • BREAST BIOPSY Left     MARCH 2020. TREATMENT WITH CHEMO.COMPLETED IN JULY 2020   • BREAST LUMPECTOMY WITH SENTINEL NODE BIOPSY Left 8/26/2020    Procedure: Left breast ultrasound-guided lumpectomy, left axillary sentinel lymph node biopsy;  Surgeon: Sunni Barber MD;  Location: [facility];  Service: General;  Laterality: Left;   • BREAST SURGERY Bilateral 8/26/2020    Procedure: LEFT BREAST ONCOPLASTIC CLOSURE RIGHT BREAST REDUCTION FOR SYMMETERY;  Surgeon: Angela Davidson MD;  Location:  "[facility] MAIN OR;  Service: Plastics;  Laterality: Bilateral;   • VENOUS ACCESS DEVICE (PORT) INSERTION Right 4/7/2020    Procedure: RIGHT port placement.;  Surgeon: Sunni Barber MD;  Location: [facility];  Service: General;  Laterality: Right;     Past Medical History:   Diagnosis Date   • Anemia    • Anxiety    • Balance problem    • Breast cancer (CMS/HCC)     Left   • Depression    • Drug therapy    • History of chemotherapy     7/22/2020. SEES DR ATKINS [place]   • History of migraine    • History of neuropathy     IN FEET   • History of UTI        Prior Hospitalizations, other than for surgery or childbirth, and year:  None     Social History     Socioeconomic History   • Marital status:      Spouse name: Garret   • Number of children: 2   • Years of education: Not on file   • Highest education level: Not on file   Occupational History   • Occupation: [employer]     Employer: [employer]   Tobacco Use   • Smoking status: Never Smoker   • Smokeless tobacco: Never Used   Substance and Sexual Activity   • Alcohol use: Yes     Comment: RARE   • Drug use: Never   • Sexual activity: Defer     Patient is .  Patient is employed full time with the following occupation: [email or web address]   Patient drinks 1-2 servings of caffeine per day.    Family History:  Family History   Problem Relation Age of Onset   • Breast cancer Paternal Aunt         GREAT PATERNAL AUNT    • Breast cancer Cousin 35        PATERNAL COUSIN    • Malig Hyperthermia Neg Hx        Vital Signs:  /79   Pulse 88   Temp 98.2 °F (36.8 °C)   Ht 160 cm (63\")   Wt 88 kg (194 lb)   LMP  (LMP Unknown)   SpO2 98%   Breastfeeding No   BMI 34.37 kg/m²      Medications:    Current Outpatient Medications:   •  melatonin 5 MG tablet tablet, Take 5-10 mg by mouth At Night As Needed (INSOMNIA)., Disp: , Rfl:   •  sertraline (ZOLOFT) 100 MG tablet, Take 200 mg by mouth Every Evening., Disp: , Rfl:   •  HYDROcodone-acetaminophen " "(Norco) 5-325 MG per tablet, Take 1-2 tablets by mouth Every 4 TO 6 Hours As Needed for pain, Disp: 30 tablet, Rfl: 0     Allergies:  No Known Allergies    Physical Examination:  /79   Pulse 88   Temp 98.2 °F (36.8 °C)   Ht 160 cm (63\")   Wt 88 kg (194 lb)   LMP  (LMP Unknown)   SpO2 98%   Breastfeeding No   BMI 34.37 kg/m²   General Appearance:  Patient is in no distress.  She is well kept and has an obese build.   Psychiatric:  Patient with appropriate mood and affect. Alert and oriented to self, time, and place.    Breast, RIGHT:  Medium large sized, with well healing reduction pattern incisions from her oncoplastic reduction Dr Davidson. No erythema, warmth, drainage. Symmetric with the contralateral side.  Breast skin is without erythema, edema, rashes.  There are no visible abnormalities upon inspection during the arm-raising maneuver or with hands on hips in the sitting position. There is no nipple retraction, discharge or nipple/areolar skin changes.There are no masses palpable in the sitting or supine positions.    Breast, LEFT:  Medium large sized, with well healing reduction pattern incisions from her oncoplastic reduction Dr Davidson. No erythema, warmth, drainage.  Breast skin is without erythema, edema, rashes.  There are no visible abnormalities upon inspection during the arm-raising maneuver or with hands on hips in the sitting position. There is no nipple retraction, discharge or nipple/areolar skin changes. There are no masses palpable in the sitting or supine positions.    Lymphatic:  There is no axillary, cervical, infraclavicular, or supraclavicular adenopathy bilaterally. Well healing LEFT axillary incision with no erythema, warmth, drainage, and no seroma.  Eyes:  Pupils are round and reactive to light.  Cardiovascular:  Heart rate and rhythm are regular.  Respiratory:  Lungs are clear bilaterally with no crackles or wheezes in any lung field.  Gastrointestinal:  Abdomen is soft, " nondistended, and nontender.     Musculoskeletal:  Good strength in all 4 extremities.   There is good range of motion in both shoulders.    Skin:  No new skin lesions or rashes on the skin excluding the breast (see breast exam above).        Imagin2020  [facility]   Radiology  Stacyalida Grewal  SONOGRAM LEFT LTD  MAMM DX BILAT  Clinical history: Left breast mass, upper outer quadrant.   Comparison: None, baseline exam.   Breasts are composed of scattered fibroglandular tissue. 3 cm high density speculated mass 11-12:00 left breast middle one third. Associated pleomorphic microcalcifications and prominent architectural distortion. Corresponds to the palpable abnormality. Right breast is unremarkable.   Left Breast US: 3.2 x 2.3 x 1.9 cm mass 11-12:00. Recommend ultrasound-guided core needle biopsy.  BIRADS: 5    2020  [facility]  Radiololgy  Stacy Grewal  BILATERAL BREAST MRI  Middle third left breast 11:00 upper inner quadrant there is an irregular mass 4.4 cm in anterior-posterior, 3.7 cm in the superior-inferior 3.1 cm in the medial-lateral. A metallic clip is located along the anterolateral margin of the mass.  No evidence for left axillary or internal mammary chain adenopathy.  No findings suspicious for malignancy in the right breast.    April 3, 2020 CT [facility] at [facility]: No evidence for metastatic disease or adenopathy.  Nonspecific bilateral ovarian lesions.  However the right ovarian dominant lesion appears larger than prior recommend pelvic ultrasound.  CT chest April 3, 2020 [facility]: 3 cm left breast mass consistent with known malignancy.  Axillary lymph nodes containing fat without discrete enlargement.  No mediastinal or hilar adenopathy.  Bilateral lower lobe atelectasis without infiltrate mass or pleural effusion.  No evidence for metastatic lesion or adenopathy.     [facility] left breast ultrasound 2020: 3.0 x 1.9 x 2.2 cm  lesion down from 3.2 x 1.9 x 2.3 cm lesion.  BI-RADS 6    [facility] bilateral breast MRI August 12, 2020: Scattered fibroglandular densities.  Middle one third left breast 11:00, upper inner quadrant there is a metallic clip mini cork-shaped within an irregular enhancing mass that measures 3.3 x 2.1 x 2.2 cm.  Previously it measured 4.4 x 3.7 x 3.7 cm.  There is been interval development of central hypointense enhancement suggestive of tumor necrosis.  There is been interval reduction in surrounding tissue vascularity.  No evidence for left axillary or internal mammary adenopathy.  No abnormal areas of enhancement in the right breast.     Impression interval partial response to neoadjuvant chemotherapy.     LEFT DX mammogram [facility] dated 8-12-20 showed fatty replaced parenchyma.  In the posterior one third upper inner quadrant left breast is a metallic clip within an irregular mass.  The mass measures 2.8 x 2.2 cm compared to previous measurements of 3.3 x 3.0.  No axillary adenopathy.  Impression interval decrease in size of the biopsy-proven malignancy in the left breast in the posterior one third upper inner quadrant.  Consistent with interval partial response to neoadjuvant chemotherapy.     Requested Dr Brush comment on the calcifications extent on this mammogram     [facility] left diagnostic mammogram addendum: There has been partial interval resolution of microcalcifications associated with malignancy in the left breast since the prior examination.  Fewer microcalcifications remain in the majority are located at the medial margin of the malignancy with fewer microcalcifications along the lateral margin.  The calcifications that remain are located within the stated dimensions of the malignancy.  No suspicious microcalcifications greater than 5 mm away from the margin of the malignancy are visualized.           Pathology:    03/04/2020  [facility]   Radiology  Stacy Carmina  US/BX BREAST LEFT  MAMM  EDX UNILATERAL LEFT  Left breast. 4 core needle biopsy specimen 9 gauge device. A marker clip was then deployed within the biopsy bed.  Diagnostic Left Mammo: Postprocedure diagnostic left craniocaudal view confirms marker clip placement within the biopsied mass.    03/04/2020  [facility]   Radiology  Stacy Grewal  Solid architecture, high nuclear grade, and infrequent mitotic figures.  Invasive ductal carcinoma, grade 2.    03/13/2020  [facility]   Pathology  Stacyaldia Grewal  OUTSIDE PATH CONSULT  Left, Core Biopsy:  F. Invasive mammary carcinoma, no special type, grade II/III (tubules =3, nuclei =2, mitoses =2) 14 mm.  G. ER - 60%; [place] - 10%; HER2 (Score 1+)  H. Ki-67 - 20%  I. No lymphvascular invasion.  J. No in situ component identified.      Pathology from August 26, 2020 left breast ultrasound-guided lumpectomy and sentinel lymph node biopsy with bilateral oncoplastics by Dr. Davidson returned as:  0 of 2 sentinel lymph nodes.  Invasive mammary carcinoma, no special type,apocrine features, 3 cm, high-grade, 3, 3, 2, unifocal, duct carcinoma in situ present, negative for extensive intraductal component.  Duct carcinoma in situ spans an area of 3 cm.  Solid, comedo and cribriform, high-grade.  Necrosis is present.  Margins are uninvolved by invasive and duct carcinoma in situ.  Closest is 6 mm from invasive and 3 mm of superior margin.  This refers to the initial lumpectomy.  Foci of lymphovascular space invasion was noted.  Estrogen 60, progesterone 10, HER-2/mary 1+, Ki-67 20%.  Pathologic stage T2N0. Stage IIA.  All additional margins were benign breast tissue.  I will call and let her know  She has a postop appt with Dr Meade next week.  We will defer to Dr Meade regarding the radiation oncology consultation since this will be local.     She sees Dr Davidson today.            Final Diagnosis   1.  [place] Lymph Node #1, Left Axilla, Excision:               A. One lymph node, negative for metastatic  carcinoma (0/1).     2. [place] Lymph Node #2, Left Axilla, Excision:               A. One lymph node, negative for metastatic carcinoma (0/1).     3. Left Breast, Oriented Lumpectomy (99 grams):  INVASIVE MAMMARY CARCINOMA, NO SPECIAL TYPE (INVASIVE                   DUCTAL CARCINOMA) WITH APOCRINE FEATURES.               A. Tumor site:  11:00 o'clock, upper inner quadrant.               B. Tumor size:  30 mm maximally.               C. Histologic grade:  [place] histologic score: III/III (tubules=3, nuclei=3, mitoses=2).               D. Tumor focality:  Single focus.               E. Ductal carcinoma in-situ (DCIS):  Present, negative or extensive intraductal component (EIC).                            1. Size (extent) of DCIS:  DCIS is associated with the periphery of the invasive tumor spanning an area                                of 30 mm from medial to lateral.                             2. Architectural patterns:  Solid, comedo and cribriform.                            3. Nuclear grade:  High (III).                            4. Necrosis:  Present, central comedo necrosis identified.                             5. Tumor extension:  Skeletal muscle uninvolved by tumor (see specimen 8).               F. Margins:  Uninvolved by invasive carcinoma and DCIS.                            1. Invasive carcinoma present 6 mm from the superior margin, 14 mm from the anterior and inferior                                margins, 15 mm from the posterior margin, 20 mm from the lateral margin and 30 mm from the medial                                margin of excision (refers to original lumpectomy specimen 3).                            2. DCIS comes to within 3 mm of the superior margin, 8 mm from the anterior margin, 14 mm from the                                 inferior margin,15 mm from the posterior margin, 20 mm from the lateral margin and 30 mm from the                                 medial margin of  excision (refers to original lumpectomy specimen 3).               G. Foci of Lymphovascular space invasion identified.               H. Lymph nodes:  Two benign sentinel lymph nodes (0/2) (specimens 1-2).               I.  Hormone receptor status:  ER positive (60%), OR positive (10%), Her2/mary negative (1+ by IHC), Ki67 20%                    (performed on prior biopsy /[id number]).               J. Pathologic stage: pT2, N(sn)0.       4. Left Breast, Additional Superior Margin, Oriented Excision:                 A. Benign unremarkable fatty breast tissue.               B. New superior margin free by an additional 16 mm.       5.  Left Breast, Additional Lateral Margin, Oriented Excision:                 A. Benign breast tissue with scattered adenosis.               B. New lateral margin free by an additional 16 mm.       6. Left Breast, Additional Inferior Margin, Oriented Excision:               A. Benign unremarkable fatty breast tissue.               B. New inferior margin free by an additional 15 mm.     7. Left Breast, Additional Medial Margin, Oriented Excision:               A. Benign unremarkable fatty breast tissue.               B. New inferior margin free by an additional 12 mm.     8.  Left Breast, Additional Posterior Margin, Oriented Excision:               A. Benign adipose tissue and skeletal muscle.               B. New posterior margin free by an additional 4 mm.     9. Skin, Left Breast, Additional Anterior Margin, Oriented Excision:               A. Benign unremarkable skin and adipose tissue.               B. New anterior margin free by an additional 8 mm.     10. Right Breast, Reduction Mammoplasty (284 grams):               A. Benign skin and breast tissue.               B. No atypical hyperplasia, in-situ nor invasive carcinoma identified.     11. Left Breast, Reduction Mammoplasty (238 grams):               A. Benign skin and breast tissue.               B. No atypical hyperplasia,  "in-situ nor invasive carcinoma identified.     Swm/kds   Electronically signed by Brenda Emmanuel MD on 8/28/2020 at 1021   Synoptic Checklist   INVASIVE CARCINOMA OF THE BREAST: Resection   Breast.Invasive - 1, 2, 3, 4, 5, 6, 7, 8, 9   8th Edition - Protocol posted: 2/26/2020        SPECIMEN   Procedure   Excision (less than total mastectomy)    Specimen Laterality   Left    TUMOR   Tumor Site   Upper inner quadrant    Histologic Type   Invasive carcinoma of no special type (ductal)    Glandular (Acinar) / Tubular Differentiation   Score 3    Nuclear Pleomorphism   Score 3    Mitotic Rate   Score 2    Overall Grade   Grade 3 (scores of 8 or 9)    Tumor Size   Greatest dimension of largest invasive focus (Millimeters): 30 mm   Tumor Focality   Single focus of invasive carcinoma    Ductal Carcinoma In Situ (DCIS)   Present        Negative for extensive intraductal component (EIC)    Size (Extent) of DCIS   Estimated size (extent) of DCIS is at least (Millimeters): 30 mm   Architectural Patterns   Comedo        Cribriform        Solid    Nuclear Grade   Grade III (high)    Necrosis   Present, central (expansive \"comedo\" necrosis)    Lobular Carcinoma In Situ (LCIS)   Not identified    Tumor Extent       Lymphovascular Invasion   Present    Dermal Lymphovascular Invasion   No skin present    Microcalcifications   Present in invasive carcinoma        Present in non-neoplastic tissue    Treatment Effect in the Breast   No known presurgical therapy    MARGINS   Invasive Carcinoma Margins   Uninvolved by invasive carcinoma    Distance from Closest Margin (Millimeters)   19 mm   Closest Margin(s)   Posterior    Distance from Other Margins       Anterior Margin (Millimeters)   22 mm   Posterior Margin (Millimeters)   19 mm   Superior Margin (Millimeters)   22 mm   Inferior Margin (Millimeters)   29 mm   Medial Margin (Millimeters)   42 mm   Lateral Margin (Millimeters)   36 mm   DCIS Margins   Uninvolved by DCIS  "   Distance from Closest Margin (Millimeters)   16 mm   Closest Margin(s)   Anterior    Distance from Other Margins       Anterior Margin (Millimeters)   16 mm   Posterior Margin (Millimeters)   19 mm   Superior Margin (Millimeters)   19 mm   Inferior Margin (Millimeters)   29 mm   Medial Margin (Millimeters)   42 mm   Lateral Margin (Millimeters)   36 mm   LYMPH NODES   Regional Lymph Nodes   Uninvolved by tumor cells    Total Number of Lymph Nodes Examined   2    Number of Mears Nodes Examined   2    PATHOLOGIC STAGE CLASSIFICATION (pTNM, AJCC 8th Edition)   Primary Tumor (pT)   pT2    Regional Lymph Nodes Modifier   (sn): Mears node(s) evaluated.    Regional Lymph Nodes (pN)   pN0    Breast Biomarker Testing Performed on Previous Biopsy       Estrogen Receptor (ER) Status   Positive (greater than 10% of cells demonstrate nuclear positivity)    Percentage of Cells with Nuclear Positivity   60 %   Breast Biomarker Testing Performed on Previous Biopsy       Progesterone Receptor (PgR) Status   Positive    Percentage of Cells with Nuclear Positivity   10 %   Breast Biomarker Testing Performed on Previous Biopsy       HER2 (by immunohistochemistry)   Negative (Score 1+)    Breast Biomarker Testing Performed on Previous Biopsy       Ki-67 Percentage of Positive Nuclei   20 %   Testing Performed on Case Number   [id number]    .      Comment     Multiple representative slides from part 3 of the case are shared internally with Livier Franco and Valery, who concur. The margins reported in specimen 3 refer to the original lumpectomy specimen.  The synoptic template incorporates the additional margins of the final margin status.          Swm/kds           Note from Dr Davidson dated 7-16-20- plan for left oncoplastic closure and right reduction    Note from Dr Good Chew at [facility]- 8-6-20-  Patient completed her scheduled chemotherapy July 22, 2020.      Procedures:      Assessment:   Diagnosis Plan   1.  Malignant neoplasm of upper-inner quadrant of left female breast, unspecified estrogen receptor status (CMS/HCC)  Mammo Diagnostic Digital Tomosynthesis Bilateral With CAD   2. FH: breast cancer     3. History of frequent urinary tract infections        1-  Grief with significant denial and anger related to cancer diagnosis.  Possible complicated by marital issues/discord, young age.    2-  Left breast 1130, 9.5 cm from the nipple, 4 cm on exam, 4.4 cm on MRI, 3.2 cm on ultrasound.  Invasive mammary carcinoma, no special type, intermediate grade, 3, 2, 2, 1.4 cm largest in a core, estrogen 60, progesterone 10, HER-2/mary 1+, Ki-67 is 20%.  No lymphovascular invasion, no duct carcinoma site 2,  Clinical stage T2N0 stage IIa.    Dr Meade-  Oncotype 38  DDAC from April 15, 2020 through May 27, 2020.  - set to start her Taxol next Wednesday on [place] 10.- last dose July 22.    -staging  CT chest abdomen pelvis April 3, 2020 that was negative for metastatic disease.  Ovarian lesions were seen and a recommendation was made for transvaginal ultrasound. Dr Meade plans to do this at a later date    -port placed by me. In place as of 9-2020    - post neoadjuvant MRI showed tumor reduction from 4.4 cm to 3.3 cm. Calcifications remaining are within 5mm of the index lesion- wont need special bracketing    Pathology from August 26, 2020 left breast ultrasound-guided lumpectomy and sentinel lymph node biopsy with bilateral oncoplastics by Dr. Davidson returned as:  0 of 2 sentinel lymph nodes.  Invasive mammary carcinoma, no special type,apocrine features, 3 cm, high-grade, 3, 3, 2, unifocal, duct carcinoma in situ present, negative for extensive intraductal component.  Duct carcinoma in situ spans an area of 3 cm.  Solid, comedo and cribriform, high-grade.  Necrosis is present.  Margins are uninvolved by invasive and duct carcinoma in situ.  Closest is 6 mm from invasive and 3 mm of superior margin.  This refers to the initial  lumpectomy.  Foci of lymphovascular space invasion was noted.  Estrogen 60, progesterone 10, HER-2/mary 1+, Ki-67 20%.  Pathologic stage ypT2N0. Stage IIA.  All additional margins were benign breast tissue.    3-  Paternal cousin age 35, paternal great aunt uncertain age  [facility] breast and gyn panel returned 3-30-20 as negative for mutation    4-  Postcoital UTIs    Plan:  Stacy and I reviewed her pathology and exam together today.  She is healing nicely. No drains remaining.    She will see Dr bennett again this month.  She will see Dr Chew and his colleague Dr Mckenzie from radiation oncology on Thursday.    Her port is in place in the case that she needs additional therapy.    Her next mammogram is due 3-3-21 at [facility].  I will arrange this and see her back after.    She is going to call Dr. Chew's office for port flushes.  I have asked her to continue her self breast exam and to call us in the interim with concerns otherwise we will see her back in March after imaging.    Sunni Barber MD    Next Appointment:  Return for Next scheduled follow up, after imaging.    EMR Dragon/transcription disclaimer:    Much of this encounter note is an electronic transcription/translocation of spoken language to printed text.  The electronic translation of spoken language may permit erroneous, or at times, nonsensical words or phrases to be inadvertently transcribed.  Although I have reviewed the note from such areas, some may still exist.

## 2020-09-16 ENCOUNTER — TELEPHONE (OUTPATIENT)
Dept: SURGERY | Facility: CLINIC | Age: 40
End: 2020-09-16

## 2020-09-16 NOTE — TELEPHONE ENCOUNTER
Had to leave a message at [facility] for them to call me back.    Patient needs  Bilateral 3D Diagnostic Mammogram on 3-3-21 (around 11:30)      I scheduled return f/u with Dr ORTIZ  3-19-21 arrive 12:15    [facility] called back.pt is scheduled for 3-3-21 @ 10:00 for Mammogram.    I left pt a message.  Salud

## 2020-09-21 LAB
CYTO UR: NORMAL
LAB AP CASE REPORT: NORMAL
LAB AP CLINICAL INFORMATION: NORMAL
LAB AP DIAGNOSIS COMMENT: NORMAL
LAB AP SPECIAL STAINS: NORMAL
LAB AP SYNOPTIC CHECKLIST: NORMAL
Lab: NORMAL
PATH REPORT.ADDENDUM SPEC: NORMAL
PATH REPORT.FINAL DX SPEC: NORMAL
PATH REPORT.GROSS SPEC: NORMAL

## 2021-02-19 ENCOUNTER — TELEPHONE (OUTPATIENT)
Dept: SURGERY | Facility: CLINIC | Age: 41
End: 2021-02-19

## 2021-02-19 NOTE — TELEPHONE ENCOUNTER
Moved pt off of 3-19-21 with   Provider out of office.    Tried to reach patient, vm full.  Tried to reach , vm full.  Could not leave a message on home phone.      Mailed new appt time. To patient.    Salud

## 2021-03-11 ENCOUNTER — TELEPHONE (OUTPATIENT)
Dept: SURGERY | Facility: CLINIC | Age: 41
End: 2021-03-11

## 2021-03-11 NOTE — TELEPHONE ENCOUNTER
[facility] bilateral diagnostic mammogram with tomosynthesis March 10, 2021.  Stable left breast lumpectomy and postradiation changes.  Stable bilateral reduction mammoplasty BI-RADS 2

## 2021-06-24 ENCOUNTER — TELEPHONE (OUTPATIENT)
Dept: SURGERY | Facility: CLINIC | Age: 41
End: 2021-06-24

## 2021-06-24 NOTE — TELEPHONE ENCOUNTER
Note from Dr Wood Escobedo with [facility]-May 20, 2021    Continue aromatase inhibitor, CT chest abdomen and pelvis and mammogram in March, see gynecologist about possible BSO, continue Lupron on schedule, DC the Zoloft in favor of Effexor.  Follow-up in March

## 2021-06-25 NOTE — PROGRESS NOTES
Chief Complaint: Stacy Grewal is a 41 y.o. female who was seen in consultation at the request of Brenda Sheikh MD  for newly diagnosed breast cancer and a followup visit    History of Present Illness:  Patient presents with newly diagnosed breast cancer, LEFT breast.  She noted a left breast mass in January.  She does not feel that this is gotten any larger.  She noticed no associated pain with this.  No associated skin changes.  She noticed it first when looking in the mirror at the gym.      She noted no other new masses, skin changes, nipple discharge, nipple changes prior to her most recent imaging.  Her most recent imaging includes the followin2020  [facility]   Radiology  Stacy Grewal  SONOGRAM LEFT LTD  MAMM DX BILAT  Clinical history: Left breast mass, upper outer quadrant.   Comparison: None, baseline exam.   Breasts are composed of scattered fibroglandular tissue. 3 cm high density speculated mass 11-12:00 left breast middle one third. Associated pleomorphic microcalcifications and prominent architectural distortion. Corresponds to the palpable abnormality. Right breast is unremarkable.   Left Breast US: 3.2 x 2.3 x 1.9 cm mass 11-12:00. Recommend ultrasound-guided core needle biopsy.  BIRADS: 5      She had a biopsy on the following day that showed:     2020  [facility]   Hazel Grewal  US/BX BREAST LEFT  MAMM EDX UNILATERAL LEFT  Left breast. 4 core needle biopsy specimen 9 gauge device. A marker clip was then deployed within the biopsy bed.  Diagnostic Left Mammo: Postprocedure diagnostic left craniocaudal view confirms marker clip placement within the biopsied mass.    2020  [facility]   Radiology  Stacy Grewal  Solid architecture, high nuclear grade, and infrequent mitotic figures.  Invasive ductal carcinoma, grade 2.    2020  [facility]   Pathology  Stacy Grewal  OUTSIDE PATH CONSULT  Left, Core Biopsy:  A. Invasive mammary  carcinoma, no special type, grade II/III (tubules =3, nuclei =2, mitoses =2) 14 mm.  B. ER - 60%; FL - 10%; HER2 (Score 1+)  C. Ki-67 - 20%  D. No lymphvascular invasion.  E. No in situ component identified.        We then arranged for a breast MRI:    03/18/2020  [facility]  Radiololgy  Stacy Grewal  BILATERAL BREAST MRI  Middle third left breast 11:00 upper inner quadrant there is an irregular mass 4.4 cm in anterior-posterior, 3.7 cm in the superior-inferior 3.1 cm in the medial-lateral. A metallic clip is located along the anterolateral margin of the mass.  No evidence for left axillary or internal mammary chain adenopathy.  No findings suspicious for malignancy in the right breast.      She has not had a breast biopsy in the past.  She has her uterus and ovaries, is premenopausal, and takes no hormones.  Her family history includes the following: She has 1 daughter, 5 sisters, 4 maternal aunts, one paternal aunt, she has a paternal cousin who had breast cancer in her mid 30s, she has a paternal great aunt who had breast cancer.      In the interim, Stacy took DDAC from April 15, 2020 through May 27, 2020.  She is set to start her Taxol next Wednesday on [place] 10.  She tells me that her port is drawing and flushing properly.  She tells me that she feels the mass is smaller and softer in the breast.  She denies any new breast masses, skin changes, nipple changes, nipple discharge.  She denies any headache, bone pain, belly pain, cough, changes in vision or gait.  Her genetics returned as negative for mutation.  She had staging CT chest abdomen pelvis April 3, 2020 that was negative for metastatic disease.  Ovarian lesions were seen and a recommendation was made for transvaginal ultrasound.    Her most recent imaging is a left breast ultrasound in radiology today: Previous measurements 3.2 x 1.9 x 2.3.  Today 3.0 x 1.9 x 2.2.  Stable no significant change BI-RADS 6.    Interval History:  In the interim,  Stacy took her last dose of Taxol on July 22.  She tells me that the mass is hard for her to feel.  She tells me her port has been functioning well.  She has seen Dr. Davidson in the interim.  She denies any belly pain bone pain cough, changes in vision or gait.  Her most recent imaging includes the following:  [facility] bilateral breast MRI August 12, 2020: Scattered fibroglandular densities.  Middle one third left breast 11:00, upper inner quadrant there is a metallic clip mini cork-shaped within an irregular enhancing mass that measures 3.3 x 2.1 x 2.2 cm.  Previously it measured 4.4 x 3.7 x 3.7 cm.  There is been interval development of central hypointense enhancement suggestive of tumor necrosis.  There is been interval reduction in surrounding tissue vascularity.  No evidence for left axillary or internal mammary adenopathy.  No abnormal areas of enhancement in the right breast.     Impression interval partial response to neoadjuvant chemotherapy.     LEFT DX mammogram [facility] dated 8-12-20 showed fatty replaced parenchyma.  In the posterior one third upper inner quadrant left breast is a metallic clip within an irregular mass.  The mass measures 2.8 x 2.2 cm compared to previous measurements of 3.3 x 3.0.  No axillary adenopathy.  Impression interval decrease in size of the biopsy-proven malignancy in the left breast in the posterior one third upper inner quadrant.  Consistent with interval partial response to neoadjuvant chemotherapy.     Requested Dr Brush comment on the calcifications extent on this mammogram     [facility] left diagnostic mammogram addendum: There has been partial interval resolution of microcalcifications associated with malignancy in the left breast since the prior examination.  Fewer microcalcifications remain in the majority are located at the medial margin of the malignancy with fewer microcalcifications along the lateral margin.  The calcifications that remain are located  within the stated dimensions of the malignancy.  No suspicious microcalcifications greater than 5 mm away from the margin of the malignancy are visualized.           Pathology from August 26, 2020 left breast ultrasound-guided lumpectomy and sentinel lymph node biopsy with bilateral oncoplastics by Dr. Davidson returned as:  0 of 2 sentinel lymph nodes.  Invasive mammary carcinoma, no special type,apocrine features, 3 cm, high-grade, 3, 3, 2, unifocal, duct carcinoma in situ present, negative for extensive intraductal component.  Duct carcinoma in situ spans an area of 3 cm.  Solid, comedo and cribriform, high-grade.  Necrosis is present.  Margins are uninvolved by invasive and duct carcinoma in situ.  Closest is 6 mm from invasive and 3 mm of superior margin.  This refers to the initial lumpectomy.  Foci of lymphovascular space invasion was noted.  Estrogen 60, progesterone 10, HER-2/mary 1+, Ki-67 20%.  Pathologic stage T2N0. Stage IIA.  All additional margins were benign breast tissue.    She denies redness, warmth, drainage from incisions.    Interval History:  In the interim,  Stacy Grewal  has done well.  She has noted no changes in her breast exam. No new masses, skin changes, nipple changes, nipple discharge either breast.   She notes that the LEFT irradiated breast is smaller than the nonirradiated RIGHT breast.    She denies headache, bone pain, belly pain, cough, changes in vision or gait.    Her most recent imaging includes the following:  [facility] bilateral diagnostic mammogram with tomosynthesis March 10, 2021.  Stable left breast lumpectomy and postradiation changes.  Stable bilateral reduction mammoplasty BI-RADS 2      She went for her followup with Dr Chew and was told that he was no longer with the practice and saw Dr Escobedo.  She tells me that during their encounter that Dr Escobedo said things suggestive of the medical oncology management being different than his preferences and that  the plastic surgical timing also different than his preferences. He then gave her a 10 month interval followup which she feels is too long of an interval.  She tells me that she left his office with the feeling that her previous care may have somehow been wrong.    She continues to gain weight, 8 # up from last visit and 25 # up from our initial encounter.  She is very unhappy about the weight gain.    She is here for review.      Review of Systems:  Review of Systems   Constitutional: Unexpected weight change: 1 lb wt loss.   All other systems reviewed and are negative.       Past Medical and Surgical History:  Breast Biopsy History:  Patient had not had a breast biopsy prior to her cancer diagnosis.  Breast Cancer HIstory:  Patient does not have a past medical history of breast cancer.  Breast Operations, and year:  None   Obstetric/Gynecologic History:  Age menstrual periods began: 12  Patient is premenopausal, first day of last period: approx 3/23/2020  Number of pregnancies: 2  Number of live births: 2  Number of abortions or miscarriages: 0  Age of delivery of first child: 22  Patient did not breast feed.  Length of time taking birth control pills: 6-8 yrs   Patient has never taken hormone replacement  Patient has both ovaries and uterus.     Past Surgical History:   Procedure Laterality Date   • APPENDECTOMY     • BREAST BIOPSY Left     MARCH 2020. TREATMENT WITH CHEMO.COMPLETED IN JULY 2020   • BREAST LUMPECTOMY WITH SENTINEL NODE BIOPSY Left 8/26/2020    Procedure: Left breast ultrasound-guided lumpectomy, left axillary sentinel lymph node biopsy;  Surgeon: Sunni Barber MD;  Location: [facility];  Service: General;  Laterality: Left;   • BREAST SURGERY Bilateral 8/26/2020    Procedure: LEFT BREAST ONCOPLASTIC CLOSURE RIGHT BREAST REDUCTION FOR SYMMETERY;  Surgeon: Angela Davidson MD;  Location: [facility];  Service: Plastics;  Laterality: Bilateral;   • VENOUS ACCESS DEVICE (PORT)  "INSERTION Right 4/7/2020    Procedure: RIGHT port placement.;  Surgeon: Sunni Barber MD;  Location: [facility] OR;  Service: General;  Laterality: Right;     Past Medical History:   Diagnosis Date   • Anemia    • Anxiety    • Balance problem    • Breast cancer (CMS/HCC)     Left   • Depression    • Drug therapy    • History of chemotherapy     7/22/2020. SEES DR ATKINS IN [place]   • History of migraine    • History of neuropathy     IN FEET   • History of UTI        Prior Hospitalizations, other than for surgery or childbirth, and year:  None     Social History     Socioeconomic History   • Marital status:      Spouse name: Garret   • Number of children: 2   • Years of education: Not on file   • Highest education level: Not on file   Tobacco Use   • Smoking status: Never Smoker   • Smokeless tobacco: Never Used   Substance and Sexual Activity   • Alcohol use: Yes     Comment: RARE   • Drug use: Never   • Sexual activity: Defer     Patient is .  Patient is employed full time with the following occupation:    Patient drinks 1-2 servings of caffeine per day.    Family History:  Family History   Problem Relation Age of Onset   • Breast cancer Paternal Aunt         GREAT PATERNAL AUNT    • Breast cancer Cousin 35        PATERNAL COUSIN    • Malig Hyperthermia Neg Hx        Vital Signs:  /80 (BP Location: Right arm, Patient Position: Sitting, Cuff Size: Adult)   Pulse 83   Temp 96.9 °F (36.1 °C) (Temporal)   Resp 16   Ht 160 cm (63\")   Wt 91.6 kg (202 lb)   SpO2 99%   BMI 35.78 kg/m²      Medications:    Current Outpatient Medications:   •  anastrozole (ARIMIDEX) 1 MG tablet, , Disp: , Rfl:   •  melatonin 5 MG tablet tablet, Take 5-10 mg by mouth At Night As Needed (INSOMNIA)., Disp: , Rfl:   •  venlafaxine XR (EFFEXOR-XR) 75 MG 24 hr capsule, Take 75 mg by mouth Daily., Disp: , Rfl:      Allergies:  No Known Allergies    Physical Examination:  /80 (BP Location: " "Right arm, Patient Position: Sitting, Cuff Size: Adult)   Pulse 83   Temp 96.9 °F (36.1 °C) (Temporal)   Resp 16   Ht 160 cm (63\")   Wt 91.6 kg (202 lb)   SpO2 99%   BMI 35.78 kg/m²   General Appearance:  Patient is in no distress.  She is well kept and has an obese build.   Psychiatric:  Patient with appropriate mood and affect. Alert and oriented to self, time, and place.    Breast, RIGHT:  Medium-large sized, asymmetric with the contralateral side, RIGHT larger than LEFT. Well healed reduction pattern incisions from oncoplastic reduction for symmetry RIGHT, Dr Davidson..  Breast skin is without erythema, edema, rashes.  There are no visible abnormalities upon inspection during the arm-raising maneuver or with hands on hips in the sitting position. There is no nipple retraction, discharge or nipple/areolar skin changes.There are no masses palpable in the sitting or supine positions.    Breast, LEFT:  medium large sized, asymmetric with the contralateral side, LEFT side smaller than RIGHT and with some hyperpigmentation from radiation.  Breast skin is without erythema, edema, rashes.  There are no visible abnormalities upon inspection during the arm-raising maneuver or with hands on hips in the sitting position. There is no nipple retraction, discharge or nipple/areolar skin changes.   There are no masses palpable in the sitting or supine positions.    Lymphatic:  There is no axillary, cervical, infraclavicular, or supraclavicular adenopathy bilaterally. Well healed LEFT axilla sentinel node incision. No palpable seroma.  Eyes:  Pupils are round and reactive to light.  Cardiovascular:  Heart rate and rhythm are regular.  Respiratory:  Lungs are clear bilaterally with no crackles or wheezes in any lung field.  Gastrointestinal:  Abdomen is soft, nondistended, and nontender.     Musculoskeletal:  Good strength in all 4 extremities.   There is good range of motion in both shoulders.    Skin:  No new skin lesions " or rashes on the skin excluding the breast (see breast exam above).    Imagin2020  [facility]   Radiology  Stacyalida Gutierrezder  SONOGRAM LEFT LTD  MAMM DX BILAT  Clinical history: Left breast mass, upper outer quadrant.   Comparison: None, baseline exam.   Breasts are composed of scattered fibroglandular tissue. 3 cm high density speculated mass 11-12:00 left breast middle one third. Associated pleomorphic microcalcifications and prominent architectural distortion. Corresponds to the palpable abnormality. Right breast is unremarkable.   Left Breast US: 3.2 x 2.3 x 1.9 cm mass 11-12:00. Recommend ultrasound-guided core needle biopsy.  BIRADS: 5    2020  [facility]  Radiololgy  Stacy [place]  BILATERAL BREAST MRI  Middle third left breast 11:00 upper inner quadrant there is an irregular mass 4.4 cm in anterior-posterior, 3.7 cm in the superior-inferior 3.1 cm in the medial-lateral. A metallic clip is located along the anterolateral margin of the mass.  No evidence for left axillary or internal mammary chain adenopathy.  No findings suspicious for malignancy in the right breast.    April 3, 2020 CT abdomen [facility] pelvis at [facility]: No evidence for metastatic disease or adenopathy.  Nonspecific bilateral ovarian lesions.  However the right ovarian dominant lesion appears larger than prior recommend pelvic ultrasound.  CT chest April 3, 2020 [facility]: 3 cm left breast mass consistent with known malignancy.  Axillary lymph nodes containing fat without discrete enlargement.  No mediastinal or hilar adenopathy.  Bilateral lower lobe atelectasis without infiltrate mass or pleural effusion.  No evidence for metastatic lesion or adenopathy.     [facility] left breast ultrasound 2020: 3.0 x 1.9 x 2.2 cm lesion down from 3.2 x 1.9 x 2.3 cm lesion.  BI-RADS 6    [facility] bilateral breast MRI 2020: Scattered fibroglandular densities.  Middle one third left breast 11:00,  upper inner quadrant there is a metallic clip mini cork-shaped within an irregular enhancing mass that measures 3.3 x 2.1 x 2.2 cm.  Previously it measured 4.4 x 3.7 x 3.7 cm.  There is been interval development of central hypointense enhancement suggestive of tumor necrosis.  There is been interval reduction in surrounding tissue vascularity.  No evidence for left axillary or internal mammary adenopathy.  No abnormal areas of enhancement in the right breast.     Impression interval partial response to neoadjuvant chemotherapy.     LEFT DX mammogram [facility] dated 8-12-20 showed fatty replaced parenchyma.  In the posterior one third upper inner quadrant left breast is a metallic clip within an irregular mass.  The mass measures 2.8 x 2.2 cm compared to previous measurements of 3.3 x 3.0.  No axillary adenopathy.  Impression interval decrease in size of the biopsy-proven malignancy in the left breast in the posterior one third upper inner quadrant.  Consistent with interval partial response to neoadjuvant chemotherapy.     Requested Dr Brush comment on the calcifications extent on this mammogram     [facility] left diagnostic mammogram addendum: There has been partial interval resolution of microcalcifications associated with malignancy in the left breast since the prior examination.  Fewer microcalcifications remain in the majority are located at the medial margin of the malignancy with fewer microcalcifications along the lateral margin.  The calcifications that remain are located within the stated dimensions of the malignancy.  No suspicious microcalcifications greater than 5 mm away from the margin of the malignancy are visualized.     [facility] bilateral diagnostic mammogram with tomosynthesis March 10, 2021.  Stable left breast lumpectomy and postradiation changes.  Stable bilateral reduction mammoplasty BI-RADS 2      Pathology:    03/04/2020  [facility]   Radiology  Stacy Grewal  US/BX  BREAST LEFT  MAMM EDX UNILATERAL LEFT  Left breast. 4 core needle biopsy specimen 9 gauge device. A marker clip was then deployed within the biopsy bed.  Diagnostic Left Mammo: Postprocedure diagnostic left craniocaudal view confirms marker clip placement within the biopsied mass.    03/04/2020  [facility]   Radiology  Stacy [place]  Solid architecture, high nuclear grade, and infrequent mitotic figures.  Invasive ductal carcinoma, grade 2.    03/13/2020  [facility]   Pathology  Stacy [place]  OUTSIDE PATH CONSULT  Left, Core Biopsy:  F. Invasive mammary carcinoma, no special type, grade II/III (tubules =3, nuclei =2, mitoses =2) 14 mm.  G. ER - 60%; VT - 10%; HER2 (Score 1+)  H. Ki-67 - 20%  I. No lymphvascular invasion.  J. No in situ component identified.      Pathology from August 26, 2020 left breast ultrasound-guided lumpectomy and sentinel lymph node biopsy with bilateral oncoplastics by Dr. Davidson returned as:  0 of 2 sentinel lymph nodes.  Invasive mammary carcinoma, no special type,apocrine features, 3 cm, high-grade, 3, 3, 2, unifocal, duct carcinoma in situ present, negative for extensive intraductal component.  Duct carcinoma in situ spans an area of 3 cm.  Solid, comedo and cribriform, high-grade.  Necrosis is present.  Margins are uninvolved by invasive and duct carcinoma in situ.  Closest is 6 mm from invasive and 3 mm of superior margin.  This refers to the initial lumpectomy.  Foci of lymphovascular space invasion was noted.  Estrogen 60, progesterone 10, HER-2/mary 1+, Ki-67 20%.  Pathologic stage T2N0. Stage IIA.              Final Diagnosis   1.  [place] Lymph Node #1, Left Axilla, Excision:               A. One lymph node, negative for metastatic carcinoma (0/1).     2. [place] Lymph Node #2, Left Axilla, Excision:               A. One lymph node, negative for metastatic carcinoma (0/1).     3. Left Breast, Oriented Lumpectomy (99 grams):  INVASIVE MAMMARY CARCINOMA, NO SPECIAL TYPE  (INVASIVE                   DUCTAL CARCINOMA) WITH APOCRINE FEATURES.               A. Tumor site:  11:00 o'clock, upper inner quadrant.               B. Tumor size:  30 mm maximally.               C. Histologic grade:  Grover histologic score: III/III (tubules=3, nuclei=3, mitoses=2).               D. Tumor focality:  Single focus.               E. Ductal carcinoma in-situ (DCIS):  Present, negative or extensive intraductal component (EIC).                            1. Size (extent) of DCIS:  DCIS is associated with the periphery of the invasive tumor spanning an area                                of 30 mm from medial to lateral.                             2. Architectural patterns:  Solid, comedo and cribriform.                            3. Nuclear grade:  High (III).                            4. Necrosis:  Present, central comedo necrosis identified.                             5. Tumor extension:  Skeletal muscle uninvolved by tumor (see specimen 8).               F. Margins:  Uninvolved by invasive carcinoma and DCIS.                            1. Invasive carcinoma present 6 mm from the superior margin, 14 mm from the anterior and inferior                                margins, 15 mm from the posterior margin, 20 mm from the lateral margin and 30 mm from the medial                                margin of excision (refers to original lumpectomy specimen 3).                            2. DCIS comes to within 3 mm of the superior margin, 8 mm from the anterior margin, 14 mm from the                                 inferior margin,15 mm from the posterior margin, 20 mm from the lateral margin and 30 mm from the                                 medial margin of excision (refers to original lumpectomy specimen 3).               G. Foci of Lymphovascular space invasion identified.               H. Lymph nodes:  Two benign sentinel lymph nodes (0/2) (specimens 1-2).               I.  Hormone receptor status:   ER positive (60%), CO positive (10%), Her2/mary negative (1+ by IHC), Ki67 20%                    (performed on prior biopsy /[id number]).               J. Pathologic stage: pT2, N(sn)0.       4. Left Breast, Additional Superior Margin, Oriented Excision:                 A. Benign unremarkable fatty breast tissue.               B. New superior margin free by an additional 16 mm.       5.  Left Breast, Additional Lateral Margin, Oriented Excision:                 A. Benign breast tissue with scattered adenosis.               B. New lateral margin free by an additional 16 mm.       6. Left Breast, Additional Inferior Margin, Oriented Excision:               A. Benign unremarkable fatty breast tissue.               B. New inferior margin free by an additional 15 mm.     7. Left Breast, Additional Medial Margin, Oriented Excision:               A. Benign unremarkable fatty breast tissue.               B. New inferior margin free by an additional 12 mm.     8.  Left Breast, Additional Posterior Margin, Oriented Excision:               A. Benign adipose tissue and skeletal muscle.               B. New posterior margin free by an additional 4 mm.     9. Skin, Left Breast, Additional Anterior Margin, Oriented Excision:               A. Benign unremarkable skin and adipose tissue.               B. New anterior margin free by an additional 8 mm.     10. Right Breast, Reduction Mammoplasty (284 grams):               A. Benign skin and breast tissue.               B. No atypical hyperplasia, in-situ nor invasive carcinoma identified.     11. Left Breast, Reduction Mammoplasty (238 grams):               A. Benign skin and breast tissue.               B. No atypical hyperplasia, in-situ nor invasive carcinoma identified.     Swm/kds   Electronically signed by Brenda Emmanuel MD on 8/28/2020 at 1021   Synoptic Checklist   INVASIVE CARCINOMA OF THE BREAST: Resection   Breast.Invasive - 1, 2, 3, 4, 5, 6, 7, 8, 9   8th  "Edition - Protocol posted: 2/26/2020        SPECIMEN   Procedure   Excision (less than total mastectomy)    Specimen Laterality   Left    TUMOR   Tumor Site   Upper inner quadrant    Histologic Type   Invasive carcinoma of no special type (ductal)    Glandular (Acinar) / Tubular Differentiation   Score 3    Nuclear Pleomorphism   Score 3    Mitotic Rate   Score 2    Overall Grade   Grade 3 (scores of 8 or 9)    Tumor Size   Greatest dimension of largest invasive focus (Millimeters): 30 mm   Tumor Focality   Single focus of invasive carcinoma    Ductal Carcinoma In Situ (DCIS)   Present        Negative for extensive intraductal component (EIC)    Size (Extent) of DCIS   Estimated size (extent) of DCIS is at least (Millimeters): 30 mm   Architectural Patterns   Comedo        Cribriform        Solid    Nuclear Grade   Grade III (high)    Necrosis   Present, central (expansive \"comedo\" necrosis)    Lobular Carcinoma In Situ (LCIS)   Not identified    Tumor Extent       Lymphovascular Invasion   Present    Dermal Lymphovascular Invasion   No skin present    Microcalcifications   Present in invasive carcinoma        Present in non-neoplastic tissue    Treatment Effect in the Breast   No known presurgical therapy    MARGINS   Invasive Carcinoma Margins   Uninvolved by invasive carcinoma    Distance from Closest Margin (Millimeters)   19 mm   Closest Margin(s)   Posterior    Distance from Other Margins       Anterior Margin (Millimeters)   22 mm   Posterior Margin (Millimeters)   19 mm   Superior Margin (Millimeters)   22 mm   Inferior Margin (Millimeters)   29 mm   Medial Margin (Millimeters)   42 mm   Lateral Margin (Millimeters)   36 mm   DCIS Margins   Uninvolved by DCIS    Distance from Closest Margin (Millimeters)   16 mm   Closest Margin(s)   Anterior    Distance from Other Margins       Anterior Margin (Millimeters)   16 mm   Posterior Margin (Millimeters)   19 mm   Superior Margin (Millimeters)   19 mm   Inferior " Margin (Millimeters)   29 mm   Medial Margin (Millimeters)   42 mm   Lateral Margin (Millimeters)   36 mm   LYMPH NODES   Regional Lymph Nodes   Uninvolved by tumor cells    Total Number of Lymph Nodes Examined   2    Number of Bradley Nodes Examined   2    PATHOLOGIC STAGE CLASSIFICATION (pTNM, AJCC 8th Edition)   Primary Tumor (pT)   pT2    Regional Lymph Nodes Modifier   (sn): Bradley node(s) evaluated.    Regional Lymph Nodes (pN)   pN0    Breast Biomarker Testing Performed on Previous Biopsy       Estrogen Receptor (ER) Status   Positive (greater than 10% of cells demonstrate nuclear positivity)    Percentage of Cells with Nuclear Positivity   60 %   Breast Biomarker Testing Performed on Previous Biopsy       Progesterone Receptor (PgR) Status   Positive    Percentage of Cells with Nuclear Positivity   10 %   Breast Biomarker Testing Performed on Previous Biopsy       HER2 (by immunohistochemistry)   Negative (Score 1+)    Breast Biomarker Testing Performed on Previous Biopsy       Ki-67 Percentage of Positive Nuclei   20 %   Testing Performed on Case Number   [id number]    .      Comment     Multiple representative slides from part 3 of the case are shared internally with Livier Franco and Valery, who concur. The margins reported in specimen 3 refer to the original lumpectomy specimen.  The synoptic template incorporates the additional margins of the final margin status.          Swm/kds           Note from Dr Davidson dated 7-16-20- plan for left oncoplastic closure and right reduction    Note from Dr Good Chew at [facility]- 8-6-20-  Patient completed her scheduled chemotherapy July 22, 2020.    Note from Dr Wood Escobedo with [facility]-May 20, 2021     Continue aromatase inhibitor, CT chest abdomen and pelvis and mammogram in March, see gynecologist about possible BSO, continue Lupron on schedule, DC the Zoloft in favor of Effexor.  Follow-up in March      Procedures:      Assessment:    Diagnosis Plan   1. Malignant neoplasm of upper-inner quadrant of left female breast, unspecified estrogen receptor status (CMS/HCC)  Mammo Screening Digital Tomosynthesis Bilateral With CAD    Ambulatory Referral to Nutrition Services   2. FH: breast cancer     3. History of frequent urinary tract infections     4. Class 2 obesity due to excess calories with body mass index (BMI) of 35.0 to 35.9 in adult, unspecified whether serious comorbidity present  Ambulatory Referral to Nutrition Services   5. Weight gain  Ambulatory Referral to Nutrition Services   6. Breast asymmetry following reconstructive surgery        1-  Left breast 1130, 9.5 cm from the nipple, 4 cm on exam, 4.4 cm on MRI, 3.2 cm on ultrasound.  Invasive mammary carcinoma, no special type, intermediate grade, 3, 2, 2, 1.4 cm largest in a core, estrogen 60, progesterone 10, HER-2/mary 1+, Ki-67 is 20%.  No lymphovascular invasion, no duct carcinoma site 2,  Clinical stage T2N0 stage IIa.    Dr Meade-  Oncotype 38  DDAC from April 15, 2020 through May 27, 2020.  Taxol start [place] 10.- last dose July 22.    -staging  CT chest abdomen pelvis April 3, 2020 that was negative for metastatic disease.  Ovarian lesions were seen and a recommendation was made for transvaginal ultrasound. Dr Meade plans to do this at a later date    -port placed by me. In place as of 9-2020    - post neoadjuvant MRI showed tumor reduction from 4.4 cm to 3.3 cm. Calcifications remaining are within 5mm of the index lesion- wont need special bracketing    Pathology from August 26, 2020 left breast ultrasound-guided lumpectomy and sentinel lymph node biopsy with bilateral oncoplastics by Dr. Davidson returned as:  0 of 2 sentinel lymph nodes.  Invasive mammary carcinoma, no special type,apocrine features, 3 cm, high-grade, 3, 3, 2, unifocal, duct carcinoma in situ present, negative for extensive intraductal component.  Duct carcinoma in situ spans an area of 3 cm.  Solid, comedo and  cribriform, high-grade.  Necrosis is present.  Margins are uninvolved by invasive and duct carcinoma in situ.  Closest is 6 mm from invasive and 3 mm of superior margin.  This refers to the initial lumpectomy.  Foci of lymphovascular space invasion was noted.  Estrogen 60, progesterone 10, HER-2/mary 1+, Ki-67 20%.  Pathologic stage ypT2N0. Stage IIA.  All additional margins were benign breast tissue.    2-  Paternal cousin age 35, paternal great aunt uncertain age  [facility] breast and gyn panel returned 3-30-20 as negative for mutation    3-  Postcoital UTIs    4-5 25 # weight gain since diagnosis- 8 # weight gain since last visit- BMI 35.8    Plan:  Stacy and I reviewed her interval history, imaging, imaging reports, treatments, and exam today.    We reviewed her breast asymmetry and discussed that the plastic surgeons leave the affected breast larger in anticipation of breast shrinkage from the radiation fibrosis. In her case, she had some significant reduction in size and some weight changes and she does have some volume asymmetry.    She is unhappy about the weight gain and wishes to lose this weight. I will arrange for her to talk with our nutritionist. I recommended thet she see Dr Davidson back to discuss the asymmetry, but after the intended weight loss.     We will look for the note from  Dr Mckenzie from radiation oncology.      Her port is in place and she will let us know when medical oncology is finished with it.    She mentioned several times that she is having reservations about Dr Escobedo. I advised her the seek a second opinion if she is uncertain of her care. I offered to provider her with [place] providers if she cannot find a recommendation locally.    Her next mammogram is due 3-11-22 at [facility].  I will arrange this and she will see Diane Lee back thereafter.    I asked her to continue her SBE and to alex us int he future with concerns and we'd be happy to see her back sooner.    Will  arrange nutrition and mammo.    Sunni Barber MD      - addendum- note from Dr Mckenzie-December 1, 2020: Patient took external beam radiation therapy to the left breast in 16 fractions completed on October 29, 2020.  Boost was not given.  Dates of treatment were October 7, 2020 through October 29, 2020.  Next Appointment:  Return for Next scheduled follow up, after imaging, with Diane Lee.      Today I spent 35 minutes doing the following: Reviewing records, labs, outside imaging and reports in preparation for the patient visit; obtaining medical history; performing the physical exam; counseling and educating the patient and any available family or caregivers; ordering medications, tests or procedures; coordinating care with any other physicians on her care team as needed, and documenting all of the above in the medical record as well as sending communications with her other healthcare professionals.    EMR Dragon/transcription disclaimer:    Much of this encounter note is an electronic transcription/translocation of spoken language to printed text.  The electronic translation of spoken language may permit erroneous, or at times, nonsensical words or phrases to be inadvertently transcribed.  Although I have reviewed the note from such areas, some may still exist.

## 2021-06-28 ENCOUNTER — OFFICE VISIT (OUTPATIENT)
Dept: SURGERY | Facility: CLINIC | Age: 41
End: 2021-06-28

## 2021-06-28 VITALS
RESPIRATION RATE: 16 BRPM | HEIGHT: 63 IN | DIASTOLIC BLOOD PRESSURE: 80 MMHG | SYSTOLIC BLOOD PRESSURE: 118 MMHG | OXYGEN SATURATION: 99 % | WEIGHT: 202 LBS | BODY MASS INDEX: 35.79 KG/M2 | TEMPERATURE: 96.9 F | HEART RATE: 83 BPM

## 2021-06-28 DIAGNOSIS — Z87.440 HISTORY OF FREQUENT URINARY TRACT INFECTIONS: ICD-10-CM

## 2021-06-28 DIAGNOSIS — N65.1 BREAST ASYMMETRY FOLLOWING RECONSTRUCTIVE SURGERY: ICD-10-CM

## 2021-06-28 DIAGNOSIS — Z80.3 FH: BREAST CANCER: ICD-10-CM

## 2021-06-28 DIAGNOSIS — E66.09 CLASS 2 OBESITY DUE TO EXCESS CALORIES WITH BODY MASS INDEX (BMI) OF 35.0 TO 35.9 IN ADULT, UNSPECIFIED WHETHER SERIOUS COMORBIDITY PRESENT: ICD-10-CM

## 2021-06-28 DIAGNOSIS — R63.5 WEIGHT GAIN: ICD-10-CM

## 2021-06-28 DIAGNOSIS — C50.212 MALIGNANT NEOPLASM OF UPPER-INNER QUADRANT OF LEFT FEMALE BREAST, UNSPECIFIED ESTROGEN RECEPTOR STATUS (HCC): Primary | ICD-10-CM

## 2021-06-28 PROCEDURE — 99214 OFFICE O/P EST MOD 30 MIN: CPT | Performed by: SURGERY

## 2021-06-28 RX ORDER — VENLAFAXINE HYDROCHLORIDE 75 MG/1
75 CAPSULE, EXTENDED RELEASE ORAL DAILY
COMMUNITY
Start: 2021-05-20 | End: 2022-09-12 | Stop reason: SDUPTHER

## 2021-06-28 RX ORDER — ANASTROZOLE 1 MG/1
TABLET ORAL
COMMUNITY
Start: 2021-06-15

## 2021-07-14 ENCOUNTER — TELEPHONE (OUTPATIENT)
Dept: SURGERY | Facility: CLINIC | Age: 41
End: 2021-07-14

## 2021-07-14 NOTE — TELEPHONE ENCOUNTER
MALCOLM  Scheduled Bilateral 3D Screen Mammo  At [facility] 3-11-21 arrive 1100    Return to see Diane Lee NP  3-25-21 at 10:30    Salud

## 2021-12-23 ENCOUNTER — TELEPHONE (OUTPATIENT)
Dept: SURGERY | Facility: CLINIC | Age: 41
End: 2021-12-23

## 2021-12-23 NOTE — TELEPHONE ENCOUNTER
Attempted to call Marcia regarding this, phone rang no vm set up.  I will f/u with her next week.  ----- Message from Mar Armenta sent at 12/17/2021 10:51 AM EST -----  Marcia at [facility] called to let us know this patient is ready for port removal. Marcia's direct line is [phone].

## 2022-01-24 ENCOUNTER — TELEPHONE (OUTPATIENT)
Dept: SURGERY | Facility: CLINIC | Age: 42
End: 2022-01-24

## 2022-01-24 NOTE — TELEPHONE ENCOUNTER
Unable to reach Marcia @ [facility] regarding port removal.    Called and left message with patient, will need ok from med onc to remove port.

## 2022-04-12 ENCOUNTER — TELEPHONE (OUTPATIENT)
Dept: SURGERY | Facility: CLINIC | Age: 42
End: 2022-04-12

## 2022-04-12 NOTE — TELEPHONE ENCOUNTER
LM for call back regarding if or when she had her MGM done. [facility] was not doing them at the time the pt was due for hers so she may be scheduled in the future. Their system was down and could not check for me.

## 2022-04-13 ENCOUNTER — TELEPHONE (OUTPATIENT)
Dept: SURGERY | Facility: CLINIC | Age: 42
End: 2022-04-13

## 2022-04-13 NOTE — TELEPHONE ENCOUNTER
Spoke to pt seeing if or when she had her MGM done. She is scheduled for that with Dash 4/26/22. She asked that I cancel her appt with Diane Lee for 4/25/22. She was out to lunch and did not have her calender with her and stated she would call me back. She also stated she will be seeing her oncologist on 4/29/22 who will discuss with her about getting her port removed and she may just schedule to see Diane after that.

## 2022-05-09 ENCOUNTER — TELEPHONE (OUTPATIENT)
Dept: SURGERY | Facility: CLINIC | Age: 42
End: 2022-05-09

## 2022-05-09 NOTE — TELEPHONE ENCOUNTER
I left a voice message for Hope with [employer] to let her know Dr. Heath's reply.     MALCOLM for call back regarding r/s her appt with Diane Lee and to see if she had her MGM done on 4/26/22 at [place]. I do not see those results.

## 2022-07-25 ENCOUNTER — DOCUMENTATION (OUTPATIENT)
Dept: SURGERY | Facility: CLINIC | Age: 42
End: 2022-07-25

## 2022-08-17 ENCOUNTER — PREP FOR SURGERY (OUTPATIENT)
Dept: OTHER | Facility: HOSPITAL | Age: 42
End: 2022-08-17

## 2022-08-17 ENCOUNTER — TELEPHONE (OUTPATIENT)
Dept: SURGERY | Facility: CLINIC | Age: 42
End: 2022-08-17

## 2022-08-17 DIAGNOSIS — C50.212 MALIGNANT NEOPLASM OF UPPER-INNER QUADRANT OF LEFT BREAST IN FEMALE, ESTROGEN RECEPTOR POSITIVE: Primary | ICD-10-CM

## 2022-08-17 DIAGNOSIS — Z17.0 MALIGNANT NEOPLASM OF UPPER-INNER QUADRANT OF LEFT BREAST IN FEMALE, ESTROGEN RECEPTOR POSITIVE: Primary | ICD-10-CM

## 2022-08-17 RX ORDER — CEFAZOLIN SODIUM 2 G/100ML
2 INJECTION, SOLUTION INTRAVENOUS ONCE
Status: CANCELLED | OUTPATIENT
Start: 2023-01-04 | End: 2022-08-17

## 2022-08-17 RX ORDER — SODIUM CHLORIDE, SODIUM LACTATE, POTASSIUM CHLORIDE, CALCIUM CHLORIDE 600; 310; 30; 20 MG/100ML; MG/100ML; MG/100ML; MG/100ML
100 INJECTION, SOLUTION INTRAVENOUS CONTINUOUS
Status: CANCELLED | OUTPATIENT
Start: 2023-01-04

## 2022-08-17 NOTE — H&P
There are no changes in the history or physical exam, aside from any that are listed as an addendum at the bottom of this document.   Today, patient will go for the surgery listed in the plan below.    Chief Complaint: Stacy Gerwal is a 40 y.o. female who was seen in consultation at the request of Brenda Sheikh MD  for newly diagnosed breast cancer and a postoperative visit    History of Present Illness:  Patient presents with newly diagnosed breast cancer, LEFT breast.  She noted a left breast mass in January.  She does not feel that this is gotten any larger.  She noticed no associated pain with this.  No associated skin changes.  She noticed it first when looking in the mirror at the gym.      She noted no other new masses, skin changes, nipple discharge, nipple changes prior to her most recent imaging.  Her most recent imaging includes the followin2020  [facility]   Radiology  Stacy Grewal  SONOGRAM LEFT LTD  MAMM DX BILAT  Clinical history: Left breast mass, upper outer quadrant.   Comparison: None, baseline exam.   Breasts are composed of scattered fibroglandular tissue. 3 cm high density speculated mass 11-12:00 left breast middle one third. Associated pleomorphic microcalcifications and prominent architectural distortion. Corresponds to the palpable abnormality. Right breast is unremarkable.   Left Breast US: 3.2 x 2.3 x 1.9 cm mass 11-12:00. Recommend ultrasound-guided core needle biopsy.  BIRADS: 5      She had a biopsy on the following day that showed:     2020  [facility]   Radiology  Stacy Grewal  US/BX BREAST LEFT  MAMM EDX UNILATERAL LEFT  Left breast. 4 core needle biopsy specimen 9 gauge device. A marker clip was then deployed within the biopsy bed.  Diagnostic Left Mammo: Postprocedure diagnostic left craniocaudal view confirms marker clip placement within the biopsied mass.    2020  [facility]   Radiology  Stacy Carmina  Solid architecture, high  nuclear grade, and infrequent mitotic figures.  Invasive ductal carcinoma, grade 2.    03/13/2020  [facility]   Pathology  Stacy Grewal  OUTSIDE PATH CONSULT  Left, Core Biopsy:  Invasive mammary carcinoma, no special type, grade II/III (tubules =3, nuclei =2, mitoses =2) 14 mm.  ER - 60%; [place] - 10%; HER2 (Score 1+)  Ki-67 - 20%  No lymphvascular invasion.  No in situ component identified.        We then arranged for a breast MRI:    03/18/2020  [facility]  Radiololgy  Stacy Grewal  BILATERAL BREAST MRI  Middle third left breast 11:00 upper inner quadrant there is an irregular mass 4.4 cm in anterior-posterior, 3.7 cm in the superior-inferior 3.1 cm in the medial-lateral. A metallic clip is located along the anterolateral margin of the mass.  No evidence for left axillary or internal mammary chain adenopathy.  No findings suspicious for malignancy in the right breast.      She has not had a breast biopsy in the past.  She has her uterus and ovaries, is premenopausal, and takes no hormones.  Her family history includes the following: She has 1 daughter, 5 sisters, 4 maternal aunts, one paternal aunt, she has a paternal cousin who had breast cancer in her mid 30s, she has a paternal great aunt who had breast cancer.      In the interim, Stacy took DDAC from April 15, 2020 through May 27, 2020.  She is set to start her Taxol next Wednesday on [place] 10.  She tells me that her port is drawing and flushing properly.  She tells me that she feels the mass is smaller and softer in the breast.  She denies any new breast masses, skin changes, nipple changes, nipple discharge.  She denies any headache, bone pain, belly pain, cough, changes in vision or gait.  Her genetics returned as negative for mutation.  She had staging CT chest abdomen pelvis April 3, 2020 that was negative for metastatic disease.  Ovarian lesions were seen and a recommendation was made for transvaginal ultrasound.    Her most recent imaging is  a left breast ultrasound in radiology today: Previous measurements 3.2 x 1.9 x 2.3.  Today 3.0 x 1.9 x 2.2.  Stable no significant change BI-RADS 6.    Interval History:  In the interim, Stacy took her last dose of Taxol on July 22.  She tells me that the mass is hard for her to feel.  She tells me her port has been functioning well.  She has seen Dr. Davidson in the interim.  She denies any belly pain bone pain cough, changes in vision or gait.  Her most recent imaging includes the following:  [facility] bilateral breast MRI August 12, 2020: Scattered fibroglandular densities.  Middle one third left breast 11:00, upper inner quadrant there is a metallic clip mini cork-shaped within an irregular enhancing mass that measures 3.3 x 2.1 x 2.2 cm.  Previously it measured 4.4 x 3.7 x 3.7 cm.  There is been interval development of central hypointense enhancement suggestive of tumor necrosis.  There is been interval reduction in surrounding tissue vascularity.  No evidence for left axillary or internal mammary adenopathy.  No abnormal areas of enhancement in the right breast.     Impression interval partial response to neoadjuvant chemotherapy.     LEFT DX mammogram [facility] dated 8-12-20 showed fatty replaced parenchyma.  In the posterior one third upper inner quadrant left breast is a metallic clip within an irregular mass.  The mass measures 2.8 x 2.2 cm compared to previous measurements of 3.3 x 3.0.  No axillary adenopathy.  Impression interval decrease in size of the biopsy-proven malignancy in the left breast in the posterior one third upper inner quadrant.  Consistent with interval partial response to neoadjuvant chemotherapy.     Requested Dr Brush comment on the calcifications extent on this mammogram     [facility] left diagnostic mammogram addendum: There has been partial interval resolution of microcalcifications associated with malignancy in the left breast since the prior examination.  Fewer  microcalcifications remain in the majority are located at the medial margin of the malignancy with fewer microcalcifications along the lateral margin.  The calcifications that remain are located within the stated dimensions of the malignancy.  No suspicious microcalcifications greater than 5 mm away from the margin of the malignancy are visualized.         Interval History:  Pathology from August 26, 2020 left breast ultrasound-guided lumpectomy and sentinel lymph node biopsy with bilateral oncoplastics by Dr. Davidson returned as:  0 of 2 sentinel lymph nodes.  Invasive mammary carcinoma, no special type,apocrine features, 3 cm, high-grade, 3, 3, 2, unifocal, duct carcinoma in situ present, negative for extensive intraductal component.  Duct carcinoma in situ spans an area of 3 cm.  Solid, comedo and cribriform, high-grade.  Necrosis is present.  Margins are uninvolved by invasive and duct carcinoma in situ.  Closest is 6 mm from invasive and 3 mm of superior margin.  This refers to the initial lumpectomy.  Foci of lymphovascular space invasion was noted.  Estrogen 60, progesterone 10, HER-2/mary 1+, Ki-67 20%.  Pathologic stage T2N0. Stage IIA.  All additional margins were benign breast tissue.    She denies redness, warmth, drainage from incisions.    She is here to review.      Review of Systems:  Review of Systems   Constitutional: Positive for diaphoresis and fatigue. Negative for appetite change, chills, fever and unexpected weight change (1 lb wt loss).   HENT:   Negative for hearing loss, lump/mass, mouth sores, nosebleeds, sore throat, tinnitus, trouble swallowing and voice change.    Eyes: Negative for eye problems and icterus.   Respiratory: Negative for chest tightness, cough, hemoptysis, shortness of breath and wheezing.    Cardiovascular: Negative for chest pain, leg swelling and palpitations.   Gastrointestinal: Negative for abdominal distention, abdominal pain, blood in stool, constipation, diarrhea,  nausea, rectal pain and vomiting.   Endocrine: Positive for hot flashes.   Genitourinary: Positive for frequency, nocturia and vaginal discharge. Negative for bladder incontinence, difficulty urinating, dyspareunia, dysuria, hematuria, menstrual problem, pelvic pain and vaginal bleeding.    Musculoskeletal: Positive for back pain and myalgias. Negative for arthralgias, flank pain, gait problem, neck pain and neck stiffness.   Skin: Negative for itching, rash and wound.   Neurological: Negative for dizziness, extremity weakness, gait problem, headaches, light-headedness, numbness, seizures and speech difficulty.   Hematological: Negative for adenopathy. Does not bruise/bleed easily.   Psychiatric/Behavioral: Positive for decreased concentration, depression and sleep disturbance. Negative for confusion and suicidal ideas. The patient is nervous/anxious.         Past Medical and Surgical History:  Breast Biopsy History:  Patient had not had a breast biopsy prior to her cancer diagnosis.  Breast Cancer HIstory:  Patient does not have a past medical history of breast cancer.  Breast Operations, and year:  None   Obstetric/Gynecologic History:  Age menstrual periods began: 12  Patient is premenopausal, first day of last period: approx 3/23/2020  Number of pregnancies: 2  Number of live births: 2  Number of abortions or miscarriages: 0  Age of delivery of first child: 22  Patient did not breast feed.  Length of time taking birth control pills: 6-8 yrs   Patient has never taken hormone replacement  Patient has both ovaries and uterus.     Past Surgical History:   Procedure Laterality Date    APPENDECTOMY      BREAST BIOPSY Left     MARCH 2020. TREATMENT WITH CHEMO.COMPLETED IN JULY 2020    BREAST LUMPECTOMY WITH SENTINEL NODE BIOPSY Left 8/26/2020    Procedure: Left breast ultrasound-guided lumpectomy, left axillary sentinel lymph node biopsy;  Surgeon: Sunni Barber MD;  Location: [facility];  Service: General;   "Laterality: Left;    BREAST SURGERY Bilateral 8/26/2020    Procedure: LEFT BREAST ONCOPLASTIC CLOSURE RIGHT BREAST REDUCTION FOR SYMMETERY;  Surgeon: Angela Davidson MD;  Location: [facility];  Service: Plastics;  Laterality: Bilateral;    VENOUS ACCESS DEVICE (PORT) INSERTION Right 4/7/2020    Procedure: RIGHT port placement.;  Surgeon: Sunni Barber MD;  Location: [facility];  Service: General;  Laterality: Right;     Past Medical History:   Diagnosis Date    Anemia     Anxiety     Balance problem     Breast cancer (CMS/HCC)     Left    Depression     Drug therapy     History of chemotherapy     7/22/2020. SEES DR ATKINS IN [place]    History of migraine     History of neuropathy     IN FEET    History of UTI        Prior Hospitalizations, other than for surgery or childbirth, and year:  None     Social History     Socioeconomic History    Marital status:      Spouse name: Garret    Number of children: 2    Years of education: Not on file    Highest education level: Not on file   Occupational History    Occupation: [employer]     Employer: [employer]   Tobacco Use    Smoking status: Never Smoker    Smokeless tobacco: Never Used   Substance and Sexual Activity    Alcohol use: Yes     Comment: RARE    Drug use: Never    Sexual activity: Defer     Patient is .  Patient is employed full time with the following occupation: [employer]   Patient drinks 1-2 servings of caffeine per day.    Family History:  Family History   Problem Relation Age of Onset    Breast cancer Paternal Aunt         GREAT PATERNAL AUNT     Breast cancer Cousin 35        PATERNAL COUSIN     Malig Hyperthermia Neg Hx        Vital Signs:  /79   Pulse 88   Temp 98.2 °F (36.8 °C)   Ht 160 cm (63\")   Wt 88 kg (194 lb)   LMP  (LMP Unknown)   SpO2 98%   Breastfeeding No   BMI 34.37 kg/m²      Medications:    Current Outpatient Medications:     melatonin 5 MG tablet tablet, Take 5-10 mg by mouth At " "Night As Needed (INSOMNIA)., Disp: , Rfl:     sertraline (ZOLOFT) 100 MG tablet, Take 200 mg by mouth Every Evening., Disp: , Rfl:     HYDROcodone-acetaminophen (Norco) 5-325 MG per tablet, Take 1-2 tablets by mouth Every 4 TO 6 Hours As Needed for pain, Disp: 30 tablet, Rfl: 0     Allergies:  No Known Allergies    Physical Examination:  /79   Pulse 88   Temp 98.2 °F (36.8 °C)   Ht 160 cm (63\")   Wt 88 kg (194 lb)   LMP  (LMP Unknown)   SpO2 98%   Breastfeeding No   BMI 34.37 kg/m²   General Appearance:  Patient is in no distress.  She is well kept and has an obese build.   Psychiatric:  Patient with appropriate mood and affect. Alert and oriented to self, time, and place.    Breast, RIGHT:  Medium large sized, with well healing reduction pattern incisions from her oncoplastic reduction Dr Davidson. No erythema, warmth, drainage. Symmetric with the contralateral side.  Breast skin is without erythema, edema, rashes.  There are no visible abnormalities upon inspection during the arm-raising maneuver or with hands on hips in the sitting position. There is no nipple retraction, discharge or nipple/areolar skin changes.There are no masses palpable in the sitting or supine positions.    Breast, LEFT:  Medium large sized, with well healing reduction pattern incisions from her oncoplastic reduction Dr Davidson. No erythema, warmth, drainage.  Breast skin is without erythema, edema, rashes.  There are no visible abnormalities upon inspection during the arm-raising maneuver or with hands on hips in the sitting position. There is no nipple retraction, discharge or nipple/areolar skin changes. There are no masses palpable in the sitting or supine positions.    Lymphatic:  There is no axillary, cervical, infraclavicular, or supraclavicular adenopathy bilaterally. Well healing LEFT axillary incision with no erythema, warmth, drainage, and no seroma.  Eyes:  Pupils are round and reactive to " light.  Cardiovascular:  Heart rate and rhythm are regular.  Respiratory:  Lungs are clear bilaterally with no crackles or wheezes in any lung field.  Gastrointestinal:  Abdomen is soft, nondistended, and nontender.     Musculoskeletal:  Good strength in all 4 extremities.   There is good range of motion in both shoulders.    Skin:  No new skin lesions or rashes on the skin excluding the breast (see breast exam above).        Imagin2020  [facility]   Radiology  Stacy Porterfield  SONOGRAM LEFT LTD  MAMM DX BILAT  Clinical history: Left breast mass, upper outer quadrant.   Comparison: None, baseline exam.   Breasts are composed of scattered fibroglandular tissue. 3 cm high density speculated mass 11-12:00 left breast middle one third. Associated pleomorphic microcalcifications and prominent architectural distortion. Corresponds to the palpable abnormality. Right breast is unremarkable.   Left Breast US: 3.2 x 2.3 x 1.9 cm mass 11-12:00. Recommend ultrasound-guided core needle biopsy.  BIRADS: 5    2020  [facility]  Radiololgy  Stacy Porterfield  BILATERAL BREAST MRI  Middle third left breast 11:00 upper inner quadrant there is an irregular mass 4.4 cm in anterior-posterior, 3.7 cm in the superior-inferior 3.1 cm in the medial-lateral. A metallic clip is located along the anterolateral margin of the mass.  No evidence for left axillary or internal mammary chain adenopathy.  No findings suspicious for malignancy in the right breast.    April 3, 2020 CT abdomen [facility] pelvis at [facility]: No evidence for metastatic disease or adenopathy.  Nonspecific bilateral ovarian lesions.  However the right ovarian dominant lesion appears larger than prior recommend pelvic ultrasound.  CT chest April 3, 2020 [facility]: 3 cm left breast mass consistent with known malignancy.  Axillary lymph nodes containing fat without discrete enlargement.  No mediastinal or hilar adenopathy.  Bilateral lower lobe  atelectasis without infiltrate mass or pleural effusion.  No evidence for metastatic lesion or adenopathy.     [facility] left breast ultrasound June 1, 2020: 3.0 x 1.9 x 2.2 cm lesion down from 3.2 x 1.9 x 2.3 cm lesion.  BI-RADS 6    [facility] bilateral breast MRI August 12, 2020: Scattered fibroglandular densities.  Middle one third left breast 11:00, upper inner quadrant there is a metallic clip mini cork-shaped within an irregular enhancing mass that measures 3.3 x 2.1 x 2.2 cm.  Previously it measured 4.4 x 3.7 x 3.7 cm.  There is been interval development of central hypointense enhancement suggestive of tumor necrosis.  There is been interval reduction in surrounding tissue vascularity.  No evidence for left axillary or internal mammary adenopathy.  No abnormal areas of enhancement in the right breast.     Impression interval partial response to neoadjuvant chemotherapy.     LEFT DX mammogram [facility] dated 8-12-20 showed fatty replaced parenchyma.  In the posterior one third upper inner quadrant left breast is a metallic clip within an irregular mass.  The mass measures 2.8 x 2.2 cm compared to previous measurements of 3.3 x 3.0.  No axillary adenopathy.  Impression interval decrease in size of the biopsy-proven malignancy in the left breast in the posterior one third upper inner quadrant.  Consistent with interval partial response to neoadjuvant chemotherapy.     Requested Dr Brush comment on the calcifications extent on this mammogram     [facility] left diagnostic mammogram addendum: There has been partial interval resolution of microcalcifications associated with malignancy in the left breast since the prior examination.  Fewer microcalcifications remain in the majority are located at the medial margin of the malignancy with fewer microcalcifications along the lateral margin.  The calcifications that remain are located within the stated dimensions of the malignancy.  No suspicious  microcalcifications greater than 5 mm away from the margin of the malignancy are visualized.           Pathology:    03/04/2020  [facility]   Radiology  Stacy Grewal  US/BX BREAST LEFT  MAMM EDX UNILATERAL LEFT  Left breast. 4 core needle biopsy specimen 9 gauge device. A marker clip was then deployed within the biopsy bed.  Diagnostic Left Mammo: Postprocedure diagnostic left craniocaudal view confirms marker clip placement within the biopsied mass.    03/04/2020  [facility]   Radiology  Stacy Grewal  Solid architecture, high nuclear grade, and infrequent mitotic figures.  Invasive ductal carcinoma, grade 2.    03/13/2020  [facility]   Pathology  Stacyalida Grewal  OUTSIDE PATH CONSULT  Left, Core Biopsy:  Invasive mammary carcinoma, no special type, grade II/III (tubules =3, nuclei =2, mitoses =2) 14 mm.  ER - 60%; VA - 10%; HER2 (Score 1+)  Ki-67 - 20%  No lymphvascular invasion.  No in situ component identified.      Pathology from August 26, 2020 left breast ultrasound-guided lumpectomy and sentinel lymph node biopsy with bilateral oncoplastics by Dr. Davidson returned as:  0 of 2 sentinel lymph nodes.  Invasive mammary carcinoma, no special type,apocrine features, 3 cm, high-grade, 3, 3, 2, unifocal, duct carcinoma in situ present, negative for extensive intraductal component.  Duct carcinoma in situ spans an area of 3 cm.  Solid, comedo and cribriform, high-grade.  Necrosis is present.  Margins are uninvolved by invasive and duct carcinoma in situ.  Closest is 6 mm from invasive and 3 mm of superior margin.  This refers to the initial lumpectomy.  Foci of lymphovascular space invasion was noted.  Estrogen 60, progesterone 10, HER-2/mary 1+, Ki-67 20%.  Pathologic stage T2N0. Stage IIA.  All additional margins were benign breast tissue.  I will call and let her know  She has a postop appt with Dr Meade next week.  We will defer to Dr Meade regarding the radiation oncology consultation since this  will be local.     She sees Dr Davidson today.            Final Diagnosis   1.  [place] Lymph Node #1, Left Axilla, Excision:               A. One lymph node, negative for metastatic carcinoma (0/1).     2. [place] Lymph Node #2, Left Axilla, Excision:               A. One lymph node, negative for metastatic carcinoma (0/1).     3. Left Breast, Oriented Lumpectomy (99 grams):  INVASIVE MAMMARY CARCINOMA, NO SPECIAL TYPE (INVASIVE                   DUCTAL CARCINOMA) WITH APOCRINE FEATURES.               A. Tumor site:  11:00 o'clock, upper inner quadrant.               B. Tumor size:  30 mm maximally.               C. Histologic grade:  [place] histologic score: III/III (tubules=3, nuclei=3, mitoses=2).               D. Tumor focality:  Single focus.               E. 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                            1. Size (extent) of DCIS:  DCIS is associated with the periphery of the invasive tumor spanning an area                                of 30 mm from medial to lateral.                             2. Architectural patterns:  Solid, comedo and cribriform.                            3. Nuclear grade:  High (III).                            4. Necrosis:  Present, central comedo necrosis identified.                             5. Tumor extension:  Skeletal muscle uninvolved by tumor (see specimen 8).               F. Margins:  Uninvolved by invasive carcinoma and DCIS.                            1. Invasive carcinoma present 6 mm from the superior margin, 14 mm from the anterior and inferior                                margins, 15 mm from the posterior margin, 20 mm from the lateral margin and 30 mm from the medial                                margin of excision (refers to original lumpectomy specimen 3).                            2. DCIS comes to within 3 mm of the superior margin, 8 mm from the anterior margin, 14 mm from the                                  inferior margin,15 mm from the posterior margin, 20 mm from the lateral margin and 30 mm from the                                 medial margin of excision (refers to original lumpectomy specimen 3).               G. Foci of Lymphovascular space invasion identified.               H. Lymph nodes:  Two benign sentinel lymph nodes (0/2) (specimens 1-2).               I.  Hormone receptor status:  ER positive (60%), ID positive (10%), Her2/mary negative (1+ by IHC), Ki67 20%                    (performed on prior biopsy /[id number]).               J. Pathologic stage: pT2, N(sn)0.       4. Left Breast, Additional Superior Margin, Oriented Excision:                 A. Benign unremarkable fatty breast tissue.               B. New superior margin free by an additional 16 mm.       5.  Left Breast, Additional Lateral Margin, Oriented Excision:                 A. Benign breast tissue with scattered adenosis.               B. New lateral margin free by an additional 16 mm.       6. Left Breast, Additional Inferior Margin, Oriented Excision:               A. Benign unremarkable fatty breast tissue.               B. New inferior margin free by an additional 15 mm.     7. Left Breast, Additional Medial Margin, Oriented Excision:               A. Benign unremarkable fatty breast tissue.               B. New inferior margin free by an additional 12 mm.     8.  Left Breast, Additional Posterior Margin, Oriented Excision:               A. Benign adipose tissue and skeletal muscle.               B. New posterior margin free by an additional 4 mm.     9. Skin, Left Breast, Additional Anterior Margin, Oriented Excision:               A. Benign unremarkable skin and adipose tissue.               B. New anterior margin free by an additional 8 mm.     10. Right Breast, Reduction Mammoplasty (284 grams):               A. Benign skin and breast tissue.               B. No atypical hyperplasia, in-situ nor  "invasive carcinoma identified.     11. Left Breast, Reduction Mammoplasty (238 grams):               A. Benign skin and breast tissue.               B. No atypical hyperplasia, in-situ nor invasive carcinoma identified.     Swm/kds   Electronically signed by Brenda Emmanuel MD on 8/28/2020 at 1021   Synoptic Checklist   INVASIVE CARCINOMA OF THE BREAST: Resection   Breast.Invasive - 1, 2, 3, 4, 5, 6, 7, 8, 9   8th Edition - Protocol posted: 2/26/2020        SPECIMEN   Procedure   Excision (less than total mastectomy)    Specimen Laterality   Left    TUMOR   Tumor Site   Upper inner quadrant    Histologic Type   Invasive carcinoma of no special type (ductal)    Glandular (Acinar) / Tubular Differentiation   Score 3    Nuclear Pleomorphism   Score 3    Mitotic Rate   Score 2    Overall Grade   Grade 3 (scores of 8 or 9)    Tumor Size   Greatest dimension of largest invasive focus (Millimeters): 30 mm   Tumor Focality   Single focus of invasive carcinoma    Ductal Carcinoma In Situ (DCIS)   Present        Negative for extensive intraductal component (EIC)    Size (Extent) of DCIS   Estimated size (extent) of DCIS is at least (Millimeters): 30 mm   Architectural Patterns   [place]        Cribriform        Solid    Nuclear Grade   Grade III (high)    Necrosis   Present, central (expansive \"comedo\" necrosis)    Lobular Carcinoma In Situ (LCIS)   Not identified    Tumor Extent       Lymphovascular Invasion   Present    Dermal Lymphovascular Invasion   No skin present    Microcalcifications   Present in invasive carcinoma        Present in non-neoplastic tissue    Treatment Effect in the Breast   No known presurgical therapy    MARGINS   Invasive Carcinoma Margins   Uninvolved by invasive carcinoma    Distance from Closest Margin (Millimeters)   19 mm   Closest Margin(s)   Posterior    Distance from Other Margins       Anterior Margin (Millimeters)   22 mm   Posterior Margin (Millimeters)   19 mm   Superior Margin " (Millimeters)   22 mm   Inferior Margin (Millimeters)   29 mm   Medial Margin (Millimeters)   42 mm   Lateral Margin (Millimeters)   36 mm   DCIS Margins   Uninvolved by DCIS    Distance from Closest Margin (Millimeters)   16 mm   Closest Margin(s)   Anterior    Distance from Other Margins       Anterior Margin (Millimeters)   16 mm   Posterior Margin (Millimeters)   19 mm   Superior Margin (Millimeters)   19 mm   Inferior Margin (Millimeters)   29 mm   Medial Margin (Millimeters)   42 mm   Lateral Margin (Millimeters)   36 mm   LYMPH NODES   Regional Lymph Nodes   Uninvolved by tumor cells    Total Number of Lymph Nodes Examined   2    Number of [place] Nodes Examined   2    PATHOLOGIC STAGE CLASSIFICATION (pTNM, AJCC 8th Edition)   Primary Tumor (pT)   pT2    Regional Lymph Nodes Modifier   (sn): [place] node(s) evaluated.    Regional Lymph Nodes (pN)   pN0    Breast Biomarker Testing Performed on Previous Biopsy       Estrogen Receptor (ER) Status   Positive (greater than 10% of cells demonstrate nuclear positivity)    Percentage of Cells with Nuclear Positivity   60 %   Breast Biomarker Testing Performed on Previous Biopsy       Progesterone Receptor (PgR) Status   Positive    Percentage of Cells with Nuclear Positivity   10 %   Breast Biomarker Testing Performed on Previous Biopsy       HER2 (by immunohistochemistry)   Negative (Score 1+)    Breast Biomarker Testing Performed on Previous Biopsy       Ki-67 Percentage of Positive Nuclei   20 %   Testing Performed on Case Number   [id number]    .      Comment     Multiple representative slides from part 3 of the case are shared internally with Livier Franco and Valery, who concur. The margins reported in specimen 3 refer to the original lumpectomy specimen.  The synoptic template incorporates the additional margins of the final margin status.          Swm/kds           Note from Dr Davidson dated 7-16-20- plan for left oncoplastic closure and right  reduction    Note from Dr Good Chew at [facility]- 8-6-20-  Patient completed her scheduled chemotherapy July 22, 2020.      Procedures:      Assessment:   Diagnosis Plan   1. Malignant neoplasm of upper-inner quadrant of left female breast, unspecified estrogen receptor status (CMS/HCC)  Mammo Diagnostic Digital Tomosynthesis Bilateral With CAD   2. FH: breast cancer     3. History of frequent urinary tract infections        1-  Grief with significant denial and anger related to cancer diagnosis.  Possible complicated by marital issues/discord, young age.    2-  Left breast 1130, 9.5 cm from the nipple, 4 cm on exam, 4.4 cm on MRI, 3.2 cm on ultrasound.  Invasive mammary carcinoma, no special type, intermediate grade, 3, 2, 2, 1.4 cm largest in a core, estrogen 60, progesterone 10, HER-2/mary 1+, Ki-67 is 20%.  No lymphovascular invasion, no duct carcinoma site 2,  Clinical stage T2N0 stage IIa.    Dr Meade-  Oncotype 38  DDAC from April 15, 2020 through May 27, 2020.  - set to start her Taxol next Wednesday on [place] 10.- last dose July 22.    -staging  CT chest abdomen pelvis April 3, 2020 that was negative for metastatic disease.  Ovarian lesions were seen and a recommendation was made for transvaginal ultrasound. Dr Meade plans to do this at a later date    -port placed by me. In place as of 9-2020    - post neoadjuvant MRI showed tumor reduction from 4.4 cm to 3.3 cm. Calcifications remaining are within 5mm of the index lesion- wont need special bracketing    Pathology from August 26, 2020 left breast ultrasound-guided lumpectomy and sentinel lymph node biopsy with bilateral oncoplastics by Dr. Davidson returned as:  0 of 2 sentinel lymph nodes.  Invasive mammary carcinoma, no special type,apocrine features, 3 cm, high-grade, 3, 3, 2, unifocal, duct carcinoma in situ present, negative for extensive intraductal component.  Duct carcinoma in situ spans an area of 3 cm.  Solid, comedo and cribriform,  high-grade.  Necrosis is present.  Margins are uninvolved by invasive and duct carcinoma in situ.  Closest is 6 mm from invasive and 3 mm of superior margin.  This refers to the initial lumpectomy.  Foci of lymphovascular space invasion was noted.  Estrogen 60, progesterone 10, HER-2/mary 1+, Ki-67 20%.  Pathologic stage ypT2N0. Stage IIA.  All additional margins were benign breast tissue.    3-  Paternal cousin age 35, paternal great aunt uncertain age  [facility] breast and gyn panel returned 3-30-20 as negative for mutation    4-  Postcoital UTIs    Plan:  Stacy and I reviewed her pathology and exam together today.  She is healing nicely. No drains remaining.    She will see Dr bennett again this month.  She will see Dr Chew and his colleague Dr Mckenzie from radiation oncology on Thursday.    Her port is in place in the case that she needs additional therapy.    Her next mammogram is due 3-3-21 at [facility].  I will arrange this and see her back after.    She is going to call Dr. Chew's office for port flushes.  I have asked her to continue her self breast exam and to call us in the interim with concerns otherwise we will see her back in March after imaging.    Sunni Barber MD    Next Appointment:  Return for Next scheduled follow up, after imaging.    EMR Dragon/transcription disclaimer:    Much of this encounter note is an electronic transcription/translocation of spoken language to printed text.  The electronic translation of spoken language may permit erroneous, or at times, nonsensical words or phrases to be inadvertently transcribed.  Although I have reviewed the note from such areas, some may still exist.        Encounter Administratively Closed by Health Information Management

## 2022-08-17 NOTE — TELEPHONE ENCOUNTER
Note from Dr. Eid August 16, 2022: Continue anastrozole.  Follow-up in 4 months.  [place] [facility] [place] [facility] has called requesting right port removal.

## 2022-08-23 ENCOUNTER — TELEPHONE (OUTPATIENT)
Dept: SURGERY | Facility: CLINIC | Age: 42
End: 2022-08-23

## 2022-08-25 ENCOUNTER — TELEPHONE (OUTPATIENT)
Dept: SURGERY | Facility: CLINIC | Age: 42
End: 2022-08-25

## 2022-08-25 NOTE — TELEPHONE ENCOUNTER
Patient to be scheduled for port removal.     Following 8/26/20 left breast lumpectomy and then Radiation Treatment patient's left breast is quite a bit smaller than right breast.     Patient let me know she spoke with Dr. Barber when last in office (6/28/21) and discussed plastic surgery to help correct this asymmetry    Patient would like to know if this is still an option, and if this surgery could be done same time as port removal

## 2022-08-26 ENCOUNTER — TELEPHONE (OUTPATIENT)
Dept: SURGERY | Facility: CLINIC | Age: 42
End: 2022-08-26

## 2022-08-26 NOTE — TELEPHONE ENCOUNTER
Following 8/26/20 left breast lumpectomy and then Radiation Treatment patient's left breast is quite a bit smaller than right breast.     Patient let me know she spoke with Dr. Barber when last in office (6/28/21) and discussed plastic surgery to help correct this asymmetry    Patient would like to know if this is still an option, and if this surgery could be done same time as port removal    Apt requested to see Dr. Davidson, patient will see Dr. Barber after this apt.

## 2022-08-26 NOTE — TELEPHONE ENCOUNTER
Scheduled Dr. Davidson consult 9/1/22 2:00 [facility]     Patient had screening mammo completed 5/9/22 at [facility]    Scheduled follow up apt with Dr. Barber Thursday 9/22/22 12:30 pm

## 2022-08-29 ENCOUNTER — TELEPHONE (OUTPATIENT)
Dept: SURGERY | Facility: CLINIC | Age: 42
End: 2022-08-29

## 2022-08-29 NOTE — TELEPHONE ENCOUNTER
Due to scheduling conflict pt appt on 9/22 needs to be r/s.    Unable to speak to pt. Had to leave a vm.    Pt to call back and confirm.    MEB

## 2022-08-29 NOTE — TELEPHONE ENCOUNTER
[facility] mammo bilateral screen 5-9-22-  LEFT lumpectomy site noted. Primarily fatty replaced. BR2.

## 2022-09-12 ENCOUNTER — OFFICE VISIT (OUTPATIENT)
Dept: SURGERY | Facility: CLINIC | Age: 42
End: 2022-09-12

## 2022-09-12 VITALS
HEIGHT: 63 IN | HEART RATE: 74 BPM | WEIGHT: 194 LBS | OXYGEN SATURATION: 100 % | SYSTOLIC BLOOD PRESSURE: 128 MMHG | BODY MASS INDEX: 34.38 KG/M2 | DIASTOLIC BLOOD PRESSURE: 74 MMHG

## 2022-09-12 DIAGNOSIS — C50.212 MALIGNANT NEOPLASM OF UPPER-INNER QUADRANT OF LEFT FEMALE BREAST, UNSPECIFIED ESTROGEN RECEPTOR STATUS: Primary | ICD-10-CM

## 2022-09-12 DIAGNOSIS — E66.09 CLASS 2 OBESITY DUE TO EXCESS CALORIES WITH BODY MASS INDEX (BMI) OF 35.0 TO 35.9 IN ADULT, UNSPECIFIED WHETHER SERIOUS COMORBIDITY PRESENT: ICD-10-CM

## 2022-09-12 DIAGNOSIS — N65.1 BREAST ASYMMETRY FOLLOWING RECONSTRUCTIVE SURGERY: ICD-10-CM

## 2022-09-12 DIAGNOSIS — Z80.3 FH: BREAST CANCER: ICD-10-CM

## 2022-09-12 DIAGNOSIS — Z87.440 HISTORY OF FREQUENT URINARY TRACT INFECTIONS: ICD-10-CM

## 2022-09-12 PROCEDURE — 99214 OFFICE O/P EST MOD 30 MIN: CPT | Performed by: SURGERY

## 2022-09-12 RX ORDER — SERTRALINE HYDROCHLORIDE 100 MG/1
100 TABLET, FILM COATED ORAL 2 TIMES DAILY
COMMUNITY
Start: 2022-08-16

## 2022-09-12 NOTE — PROGRESS NOTES
Chief Complaint: Stacy Grewal is a 42 y.o. female who was seen in consultation at the request of Brenda Sheikh MD  for newly diagnosed breast cancer and a followup visit    History of Present Illness:  Patient presents with newly diagnosed breast cancer, LEFT breast.  She noted a left breast mass in January.  She does not feel that this is gotten any larger.  She noticed no associated pain with this.  No associated skin changes.  She noticed it first when looking in the mirror at the gym.      She noted no other new masses, skin changes, nipple discharge, nipple changes prior to her most recent imaging.  Her most recent imaging includes the followin2020  [facility]   Radiology  Stacy Grewal  SONOGRAM LEFT LTD  MAMM DX BILAT  Clinical history: Left breast mass, upper outer quadrant.   Comparison: None, baseline exam.   Breasts are composed of scattered fibroglandular tissue. 3 cm high density speculated mass 11-12:00 left breast middle one third. Associated pleomorphic microcalcifications and prominent architectural distortion. Corresponds to the palpable abnormality. Right breast is unremarkable.   Left Breast US: 3.2 x 2.3 x 1.9 cm mass 11-12:00. Recommend ultrasound-guided core needle biopsy.  BIRADS: 5      She had a biopsy on the following day that showed:     2020  [facility]   Hazel Grewal  US/BX BREAST LEFT  MAMM EDX UNILATERAL LEFT  Left breast. 4 core needle biopsy specimen 9 gauge device. A marker clip was then deployed within the biopsy bed.  Diagnostic Left Mammo: Postprocedure diagnostic left craniocaudal view confirms marker clip placement within the biopsied mass.    2020  [facility]   Radiology  Stacy Grewal  Solid architecture, high nuclear grade, and infrequent mitotic figures.  Invasive ductal carcinoma, grade 2.    2020  [facility]   Pathology  Stacy Grewal  OUTSIDE PATH CONSULT  Left, Core Biopsy:  A. Invasive mammary  carcinoma, no special type, grade II/III (tubules =3, nuclei =2, mitoses =2) 14 mm.  B. ER - 60%; CO - 10%; HER2 (Score 1+)  C. Ki-67 - 20%  D. No lymphvascular invasion.  E. No in situ component identified.        We then arranged for a breast MRI:    03/18/2020  [facility]  Radiololgy  Stacy Grewal  BILATERAL BREAST MRI  Middle third left breast 11:00 upper inner quadrant there is an irregular mass 4.4 cm in anterior-posterior, 3.7 cm in the superior-inferior 3.1 cm in the medial-lateral. A metallic clip is located along the anterolateral margin of the mass.  No evidence for left axillary or internal mammary chain adenopathy.  No findings suspicious for malignancy in the right breast.      She has not had a breast biopsy in the past.  She has her uterus and ovaries, is premenopausal, and takes no hormones.  Her family history includes the following: She has 1 daughter, 5 sisters, 4 maternal aunts, one paternal aunt, she has a paternal cousin who had breast cancer in her mid 30s, she has a paternal great aunt who had breast cancer.      In the interim, Stacy took DDAC from April 15, 2020 through May 27, 2020.  She is set to start her Taxol next Wednesday on [place] 10.  She tells me that her port is drawing and flushing properly.  She tells me that she feels the mass is smaller and softer in the breast.  She denies any new breast masses, skin changes, nipple changes, nipple discharge.  She denies any headache, bone pain, belly pain, cough, changes in vision or gait.  Her genetics returned as negative for mutation.  She had staging CT chest abdomen pelvis April 3, 2020 that was negative for metastatic disease.  Ovarian lesions were seen and a recommendation was made for transvaginal ultrasound.    Her most recent imaging is a left breast ultrasound in radiology today: Previous measurements 3.2 x 1.9 x 2.3.  Today 3.0 x 1.9 x 2.2.  Stable no significant change BI-RADS 6.    Interval History:  In the interim,  Stacy took her last dose of Taxol on July 22.  She tells me that the mass is hard for her to feel.  She tells me her port has been functioning well.  She has seen Dr. Davidson in the interim.  She denies any belly pain bone pain cough, changes in vision or gait.  Her most recent imaging includes the following:  [facility] bilateral breast MRI August 12, 2020: Scattered fibroglandular densities.  Middle one third left breast 11:00, upper inner quadrant there is a metallic clip mini cork-shaped within an irregular enhancing mass that measures 3.3 x 2.1 x 2.2 cm.  Previously it measured 4.4 x 3.7 x 3.7 cm.  There is been interval development of central hypointense enhancement suggestive of tumor necrosis.  There is been interval reduction in surrounding tissue vascularity.  No evidence for left axillary or internal mammary adenopathy.  No abnormal areas of enhancement in the right breast.     Impression interval partial response to neoadjuvant chemotherapy.     LEFT DX mammogram [facility] dated 8-12-20 showed fatty replaced parenchyma.  In the posterior one third upper inner quadrant left breast is a metallic clip within an irregular mass.  The mass measures 2.8 x 2.2 cm compared to previous measurements of 3.3 x 3.0.  No axillary adenopathy.  Impression interval decrease in size of the biopsy-proven malignancy in the left breast in the posterior one third upper inner quadrant.  Consistent with interval partial response to neoadjuvant chemotherapy.     Requested Dr Brush comment on the calcifications extent on this mammogram     [facility] left diagnostic mammogram addendum: There has been partial interval resolution of microcalcifications associated with malignancy in the left breast since the prior examination.  Fewer microcalcifications remain in the majority are located at the medial margin of the malignancy with fewer microcalcifications along the lateral margin.  The calcifications that remain are located  within the stated dimensions of the malignancy.  No suspicious microcalcifications greater than 5 mm away from the margin of the malignancy are visualized.           Pathology from August 26, 2020 left breast ultrasound-guided lumpectomy and sentinel lymph node biopsy with bilateral oncoplastics by Dr. Davidson returned as:  0 of 2 sentinel lymph nodes.  Invasive mammary carcinoma, no special type,apocrine features, 3 cm, high-grade, 3, 3, 2, unifocal, duct carcinoma in situ present, negative for extensive intraductal component.  Duct carcinoma in situ spans an area of 3 cm.  Solid, comedo and cribriform, high-grade.  Necrosis is present.  Margins are uninvolved by invasive and duct carcinoma in situ.  Closest is 6 mm from invasive and 3 mm of superior margin.  This refers to the initial lumpectomy.  Foci of lymphovascular space invasion was noted.  Estrogen 60, progesterone 10, HER-2/mary 1+, Ki-67 20%.  Pathologic stage T2N0. Stage IIA.  All additional margins were benign breast tissue.    She denies redness, warmth, drainage from incisions.      In the interim,  Stacy Grewal  has done well.  She has noted no changes in her breast exam. No new masses, skin changes, nipple changes, nipple discharge either breast.   She notes that the LEFT irradiated breast is smaller than the nonirradiated RIGHT breast.    She denies headache, bone pain, belly pain, cough, changes in vision or gait.    Her most recent imaging includes the following:  [facility] bilateral diagnostic mammogram with tomosynthesis March 10, 2021.  Stable left breast lumpectomy and postradiation changes.  Stable bilateral reduction mammoplasty BI-RADS 2      She went for her followup with Dr Chew and was told that he was no longer with the practice and saw Dr Escobedo.  She tells me that during their encounter that Dr Escobedo said things suggestive of the medical oncology management being different than his preferences and that the plastic  surgical timing also different than his preferences. He then gave her a 10 month interval followup which she feels is too long of an interval.  She tells me that she left his office with the feeling that her previous care may have somehow been wrong.    She continues to gain weight, 8 # up from last visit and 25 # up from our initial encounter.  She is very unhappy about the weight gain.    Interval History:    In the interim,  Stacy Grewal  has done well.  She has noted no changes in her breast exam. No new masses, skin changes, nipple changes, nipple discharge either breast.   She does report that her right breast is quite a bit larger than the left post radiation.    She denies headache, bone pain, belly pain, cough, changes in vision or gait.  Her most recent imaging includes the following:  [facility] bilateral screen 5-9-22-  LEFT lumpectomy site noted. Primarily fatty replaced. BR2.       Dr. Eid has called requesting right port removal.  She is here for review    Review of Systems:  Review of Systems   Constitutional: Negative for unexpected weight change (8 lb wt loss).   All other systems reviewed and are negative.       Past Medical and Surgical History:  Breast Biopsy History:  Patient had not had a breast biopsy prior to her cancer diagnosis.  Breast Cancer HIstory:  Patient does not have a past medical history of breast cancer.  Breast Operations, and year:  None   Obstetric/Gynecologic History:  Age menstrual periods began: 12  Patient is premenopausal, first day of last period: approx 3/23/2020  Number of pregnancies: 2  Number of live births: 2  Number of abortions or miscarriages: 0  Age of delivery of first child: 22  Patient did not breast feed.  Length of time taking birth control pills: 6-8 yrs   Patient has never taken hormone replacement  Patient has both ovaries and uterus.     Past Surgical History:   Procedure Laterality Date   • APPENDECTOMY     • BREAST BIOPSY Left  "    MARCH 2020. TREATMENT WITH CHEMO.COMPLETED IN JULY 2020   • BREAST LUMPECTOMY WITH SENTINEL NODE BIOPSY Left 8/26/2020    Procedure: Left breast ultrasound-guided lumpectomy, left axillary sentinel lymph node biopsy;  Surgeon: Sunni Barber MD;  Location: [facility];  Service: General;  Laterality: Left;   • BREAST SURGERY Bilateral 8/26/2020    Procedure: LEFT BREAST ONCOPLASTIC CLOSURE RIGHT BREAST REDUCTION FOR SYMMETERY;  Surgeon: Angela Davidson MD;  Location: [facility];  Service: Plastics;  Laterality: Bilateral;   • VENOUS ACCESS DEVICE (PORT) INSERTION Right 4/7/2020    Procedure: RIGHT port placement.;  Surgeon: Sunni Barber MD;  Location: [facility];  Service: General;  Laterality: Right;     Past Medical History:   Diagnosis Date   • Anemia    • Anxiety    • Balance problem    • Breast cancer (HCC)     Left   • Depression    • Drug therapy    • History of chemotherapy     7/22/2020. SEES DR ATKINS IN [place]   • History of migraine    • History of neuropathy     IN FEET   • History of UTI        Prior Hospitalizations, other than for surgery or childbirth, and year:  None     Social History     Socioeconomic History   • Marital status:      Spouse name: Garret   • Number of children: 2   Tobacco Use   • Smoking status: Never Smoker   • Smokeless tobacco: Never Used   Substance and Sexual Activity   • Alcohol use: Yes     Comment: RARE   • Drug use: Never   • Sexual activity: Defer     Patient is .  Patient is employed full time with the following occupation:    Patient drinks 1-2 servings of caffeine per day.    Family History:  Family History   Problem Relation Age of Onset   • Breast cancer Paternal Aunt         GREAT PATERNAL AUNT    • Breast cancer Cousin 35        PATERNAL COUSIN    • Malig Hyperthermia Neg Hx        Vital Signs:  /74   Pulse 74   Ht 160 cm (63\")   Wt 88 kg (194 lb)   LMP  (LMP Unknown)   SpO2 100%   " "Breastfeeding No   BMI 34.37 kg/m²      Medications:    Current Outpatient Medications:   •  anastrozole (ARIMIDEX) 1 MG tablet, , Disp: , Rfl:   •  sertraline (ZOLOFT) 100 MG tablet, Take 100 mg by mouth 2 (Two) Times a Day., Disp: , Rfl:      Allergies:  No Known Allergies    Physical Examination:  /74   Pulse 74   Ht 160 cm (63\")   Wt 88 kg (194 lb)   LMP  (LMP Unknown)   SpO2 100%   Breastfeeding No   BMI 34.37 kg/m²   General Appearance:  Patient is in no distress.  She is well kept and has an obese build.   Psychiatric:  Patient with appropriate mood and affect. Alert and oriented to self, time, and place.    Breast, RIGHT:  Medium-large sized, asymmetric with the contralateral side, RIGHT larger than LEFT. Well healed reduction pattern incisions from oncoplastic reduction for symmetry RIGHT, Dr Davidson..  Breast skin is without erythema, edema, rashes.  There are no visible abnormalities upon inspection during the arm-raising maneuver or with hands on hips in the sitting position. There is no nipple retraction, discharge or nipple/areolar skin changes.There are no masses palpable in the sitting or supine positions.    Breast, LEFT:  medium large sized, asymmetric with the contralateral side, LEFT side smaller than RIGHT and with some hyperpigmentation from radiation.   Well-healed reduction pattern incisions from oncoplastic closure Dr. Davidson.  Breast skin is without erythema, edema, rashes.  There are no visible abnormalities upon inspection during the arm-raising maneuver or with hands on hips in the sitting position. There is no nipple retraction, discharge or nipple/areolar skin changes.   There are no masses palpable in the sitting or supine positions.    Lymphatic:  There is no axillary, cervical, infraclavicular, or supraclavicular adenopathy bilaterally. Well healed LEFT axilla sentinel node incision. No palpable seroma.  Eyes:  Pupils are round and reactive to " light.  Cardiovascular:  Heart rate and rhythm are regular.  Respiratory:  Lungs are clear bilaterally with no crackles or wheezes in any lung field.  Gastrointestinal:  Abdomen is soft, nondistended, and nontender.     Musculoskeletal:  Good strength in all 4 extremities.   There is good range of motion in both shoulders.    Skin:  No new skin lesions or rashes on the skin excluding the breast (see breast exam above).    Imagin2020  [facility]   Radiology  Stacy Searcy  SONOGRAM LEFT LTD  MAMM DX BILAT  Clinical history: Left breast mass, upper outer quadrant.   Comparison: None, baseline exam.   Breasts are composed of scattered fibroglandular tissue. 3 cm high density speculated mass 11-12:00 left breast middle one third. Associated pleomorphic microcalcifications and prominent architectural distortion. Corresponds to the palpable abnormality. Right breast is unremarkable.   Left Breast US: 3.2 x 2.3 x 1.9 cm mass 11-12:00. Recommend ultrasound-guided core needle biopsy.  BIRADS: 5    2020  [facility]  Radiololgy  Stacy Searcy  BILATERAL BREAST MRI  Middle third left breast 11:00 upper inner quadrant there is an irregular mass 4.4 cm in anterior-posterior, 3.7 cm in the superior-inferior 3.1 cm in the medial-lateral. A metallic clip is located along the anterolateral margin of the mass.  No evidence for left axillary or internal mammary chain adenopathy.  No findings suspicious for malignancy in the right breast.    April 3, 2020 CT abdomen and pelvis at [facility]: No evidence for metastatic disease or adenopathy.  Nonspecific bilateral ovarian lesions.  However the right ovarian dominant lesion appears larger than prior recommend pelvic ultrasound.  CT chest April 3, 2020 [facility]: 3 cm left breast mass consistent with known malignancy.  Axillary lymph nodes containing fat without discrete enlargement.  No mediastinal or hilar adenopathy.  Bilateral lower lobe  atelectasis without infiltrate mass or pleural effusion.  No evidence for metastatic lesion or adenopathy.     [facility] left breast ultrasound June 1, 2020: 3.0 x 1.9 x 2.2 cm lesion down from 3.2 x 1.9 x 2.3 cm lesion.  BI-RADS 6    [facility] bilateral breast MRI August 12, 2020: Scattered fibroglandular densities.  Middle one third left breast 11:00, upper inner quadrant there is a metallic clip mini cork-shaped within an irregular enhancing mass that measures 3.3 x 2.1 x 2.2 cm.  Previously it measured 4.4 x 3.7 x 3.7 cm.  There is been interval development of central hypointense enhancement suggestive of tumor necrosis.  There is been interval reduction in surrounding tissue vascularity.  No evidence for left axillary or internal mammary adenopathy.  No abnormal areas of enhancement in the right breast.     Impression interval partial response to neoadjuvant chemotherapy.     LEFT DX mammogram [facility] dated 8-12-20 showed fatty replaced parenchyma.  In the posterior one third upper inner quadrant left breast is a metallic clip within an irregular mass.  The mass measures 2.8 x 2.2 cm compared to previous measurements of 3.3 x 3.0.  No axillary adenopathy.  Impression interval decrease in size of the biopsy-proven malignancy in the left breast in the posterior one third upper inner quadrant.  Consistent with interval partial response to neoadjuvant chemotherapy.     Requested Dr Brush comment on the calcifications extent on this mammogram     [facility] left diagnostic mammogram addendum: There has been partial interval resolution of microcalcifications associated with malignancy in the left breast since the prior examination.  Fewer microcalcifications remain in the majority are located at the medial margin of the malignancy with fewer microcalcifications along the lateral margin.  The calcifications that remain are located within the stated dimensions of the malignancy.  No suspicious  microcalcifications greater than 5 mm away from the margin of the malignancy are visualized.     [facility] bilateral diagnostic mammogram with tomosynthesis March 10, 2021.  Stable left breast lumpectomy and postradiation changes.  Stable bilateral reduction mammoplasty BI-RADS 2      [facility] bilateral screen 5-9-22-  LEFT lumpectomy site noted. Primarily fatty replaced. BR2.     Pathology:    03/04/2020  [facility]   Radiology  Stacy Grewal  US/BX BREAST LEFT  MAMM EDX UNILATERAL LEFT  Left breast. 4 core needle biopsy specimen 9 gauge device. A marker clip was then deployed within the biopsy bed.  Diagnostic Left Mammo: Postprocedure diagnostic left craniocaudal view confirms marker clip placement within the biopsied mass.    03/04/2020  [facility]   Radiology  Stacy Grewal  Solid architecture, high nuclear grade, and infrequent mitotic figures.  Invasive ductal carcinoma, grade 2.    03/13/2020  [facility]   Pathology  Stacy Grewal  OUTSIDE PATH CONSULT  Left, Core Biopsy:  F. Invasive mammary carcinoma, no special type, grade II/III (tubules =3, nuclei =2, mitoses =2) 14 mm.  G. ER - 60%; PA - 10%; HER2 (Score 1+)  H. Ki-67 - 20%  I. No lymphvascular invasion.  J. No in situ component identified.      Pathology from August 26, 2020 left breast ultrasound-guided lumpectomy and sentinel lymph node biopsy with bilateral oncoplastics by Dr. Davidson returned as:  0 of 2 sentinel lymph nodes.  Invasive mammary carcinoma, no special type,apocrine features, 3 cm, high-grade, 3, 3, 2, unifocal, duct carcinoma in situ present, negative for extensive intraductal component.  Duct carcinoma in situ spans an area of 3 cm.  Solid, comedo and cribriform, high-grade.  Necrosis is present.  Margins are uninvolved by invasive and duct carcinoma in situ.  Closest is 6 mm from invasive and 3 mm of superior margin.  This refers to the initial lumpectomy.  Foci of lymphovascular space invasion  was noted.  Estrogen 60, progesterone 10, HER-2/mary 1+, Ki-67 20%.  Pathologic stage T2N0. Stage IIA.              Final Diagnosis   1.  Bard Lymph Node #1, Left Axilla, Excision:               A. One lymph node, negative for metastatic carcinoma (0/1).     2. Bard Lymph Node #2, Left Axilla, Excision:               A. One lymph node, negative for metastatic carcinoma (0/1).     3. Left Breast, Oriented Lumpectomy (99 grams):  INVASIVE MAMMARY CARCINOMA, NO SPECIAL TYPE (INVASIVE                   DUCTAL CARCINOMA) WITH APOCRINE FEATURES.               A. Tumor site:  11:00 o'clock, upper inner quadrant.               B. Tumor size:  30 mm maximally.               C. Histologic grade:  [place] histologic score: III/III (tubules=3, nuclei=3, mitoses=2).               D. Tumor focality:  Single focus.               E. Ductal carcinoma in-situ (DCIS):  Present, negative or extensive intraductal component (EIC).                            1. Size (extent) of DCIS:  DCIS is associated with the periphery of the invasive tumor spanning an area                                of 30 mm from medial to lateral.                             2. Architectural patterns:  Solid, comedo and cribriform.                            3. Nuclear grade:  High (III).                            4. Necrosis:  Present, central comedo necrosis identified.                             5. Tumor extension:  Skeletal muscle uninvolved by tumor (see specimen 8).               F. Margins:  Uninvolved by invasive carcinoma and DCIS.                            1. Invasive carcinoma present 6 mm from the superior margin, 14 mm from the anterior and inferior                                margins, 15 mm from the posterior margin, 20 mm from the lateral margin and 30 mm from the medial                                margin of excision (refers to original lumpectomy specimen 3).                            2. DCIS comes to within 3 mm of the  superior margin, 8 mm from the anterior margin, 14 mm from the                                 inferior margin,15 mm from the posterior margin, 20 mm from the lateral margin and 30 mm from the                                 medial margin of excision (refers to original lumpectomy specimen 3).               G. Foci of Lymphovascular space invasion identified.               H. Lymph nodes:  Two benign sentinel lymph nodes (0/2) (specimens 1-2).               I.  Hormone receptor status:  ER positive (60%), MN positive (10%), Her2/mary negative (1+ by IHC), Ki67 20%                    (performed on prior biopsy /[id number]).               J. Pathologic stage: pT2, N(sn)0.       4. Left Breast, Additional Superior Margin, Oriented Excision:                 A. Benign unremarkable fatty breast tissue.               B. New superior margin free by an additional 16 mm.       5.  Left Breast, Additional Lateral Margin, Oriented Excision:                 A. Benign breast tissue with scattered adenosis.               B. New lateral margin free by an additional 16 mm.       6. Left Breast, Additional Inferior Margin, Oriented Excision:               A. Benign unremarkable fatty breast tissue.               B. New inferior margin free by an additional 15 mm.     7. Left Breast, Additional Medial Margin, Oriented Excision:               A. Benign unremarkable fatty breast tissue.               B. New inferior margin free by an additional 12 mm.     8.  Left Breast, Additional Posterior Margin, Oriented Excision:               A. Benign adipose tissue and skeletal muscle.               B. New posterior margin free by an additional 4 mm.     9. Skin, Left Breast, Additional Anterior Margin, Oriented Excision:               A. Benign unremarkable skin and adipose tissue.               B. New anterior margin free by an additional 8 mm.     10. Right Breast, Reduction Mammoplasty (284 grams):               A. Benign skin and breast  "tissue.               B. No atypical hyperplasia, in-situ nor invasive carcinoma identified.     11. Left Breast, Reduction Mammoplasty (238 grams):               A. Benign skin and breast tissue.               B. No atypical hyperplasia, in-situ nor invasive carcinoma identified.     Swm/kds   Electronically signed by Brenda Emmanuel MD on 8/28/2020 at 1021   Synoptic Checklist   INVASIVE CARCINOMA OF THE BREAST: Resection   Breast.Invasive - 1, 2, 3, 4, 5, 6, 7, 8, 9   8th Edition - Protocol posted: 2/26/2020        SPECIMEN   Procedure   Excision (less than total mastectomy)    Specimen Laterality   Left    TUMOR   Tumor Site   Upper inner quadrant    Histologic Type   Invasive carcinoma of no special type (ductal)    Glandular (Acinar) / Tubular Differentiation   Score 3    Nuclear Pleomorphism   Score 3    Mitotic Rate   Score 2    Overall Grade   Grade 3 (scores of 8 or 9)    Tumor Size   Greatest dimension of largest invasive focus (Millimeters): 30 mm   Tumor Focality   Single focus of invasive carcinoma    Ductal Carcinoma In Situ (DCIS)   Present        Negative for extensive intraductal component (EIC)    Size (Extent) of DCIS   Estimated size (extent) of DCIS is at least (Millimeters): 30 mm   Architectural Patterns   Comedo        Cribriform        Solid    Nuclear Grade   Grade III (high)    Necrosis   Present, central (expansive \"comedo\" necrosis)    Lobular Carcinoma In Situ (LCIS)   Not identified    Tumor Extent       Lymphovascular Invasion   Present    Dermal Lymphovascular Invasion   No skin present    Microcalcifications   Present in invasive carcinoma        Present in non-neoplastic tissue    Treatment Effect in the Breast   No known presurgical therapy    MARGINS   Invasive Carcinoma Margins   Uninvolved by invasive carcinoma    Distance from Closest Margin (Millimeters)   19 mm   Closest Margin(s)   Posterior    Distance from Other Margins       Anterior Margin (Millimeters)   22 mm "   Posterior Margin (Millimeters)   19 mm   Superior Margin (Millimeters)   22 mm   Inferior Margin (Millimeters)   29 mm   Medial Margin (Millimeters)   42 mm   Lateral Margin (Millimeters)   36 mm   DCIS Margins   Uninvolved by DCIS    Distance from Closest Margin (Millimeters)   16 mm   Closest Margin(s)   Anterior    Distance from Other Margins       Anterior Margin (Millimeters)   16 mm   Posterior Margin (Millimeters)   19 mm   Superior Margin (Millimeters)   19 mm   Inferior Margin (Millimeters)   29 mm   Medial Margin (Millimeters)   42 mm   Lateral Margin (Millimeters)   36 mm   LYMPH NODES   Regional Lymph Nodes   Uninvolved by tumor cells    Total Number of Lymph Nodes Examined   2    Number of [place] Nodes Examined   2    PATHOLOGIC STAGE CLASSIFICATION (pTNM, AJCC 8th Edition)   Primary Tumor (pT)   pT2    Regional Lymph Nodes Modifier   (sn): [place] node(s) evaluated.    Regional Lymph Nodes (pN)   pN0    Breast Biomarker Testing Performed on Previous Biopsy       Estrogen Receptor (ER) Status   Positive (greater than 10% of cells demonstrate nuclear positivity)    Percentage of Cells with Nuclear Positivity   60 %   Breast Biomarker Testing Performed on Previous Biopsy       Progesterone Receptor (PgR) Status   Positive    Percentage of Cells with Nuclear Positivity   10 %   Breast Biomarker Testing Performed on Previous Biopsy       HER2 (by immunohistochemistry)   Negative (Score 1+)    Breast Biomarker Testing Performed on Previous Biopsy       Ki-67 Percentage of Positive Nuclei   20 %   Testing Performed on Case Number   [id number]    .      Comment     Multiple representative slides from part 3 of the case are shared internally with Livier Franco and Valery, who concur. The margins reported in specimen 3 refer to the original lumpectomy specimen.  The synoptic template incorporates the additional margins of the final margin status.          Swm/kinjals           Note from Dr Davidson dated  7-16-20- plan for left oncoplastic closure and right reduction    Note from Dr Good Chew at [facility]- 8-6-20-  Patient completed her scheduled chemotherapy July 22, 2020.    Note from Dr Wood Escobedo with [facility]-May 20, 2021     Continue aromatase inhibitor, CT chest abdomen and pelvis and mammogram in March, see gynecologist about possible BSO, continue Lupron on schedule, DC the Zoloft in favor of Effexor.  Follow-up in March                Procedures:      Assessment:   Diagnosis Plan   1. Malignant neoplasm of upper-inner quadrant of left female breast, unspecified estrogen receptor status (HCC)     2. FH: breast cancer     3. History of frequent urinary tract infections     4. Class 2 obesity due to excess calories with body mass index (BMI) of 35.0 to 35.9 in adult, unspecified whether serious comorbidity present     5. Breast asymmetry following reconstructive surgery        1-  Left breast 1130, 9.5 cm from the nipple, 4 cm on exam, 4.4 cm on MRI, 3.2 cm on ultrasound.  Invasive mammary carcinoma, no special type, intermediate grade, 3, 2, 2, 1.4 cm largest in a core, estrogen 60, progesterone 10, HER-2/mary 1+, Ki-67 is 20%.  No lymphovascular invasion, no duct carcinoma site 2,  Clinical stage T2N0 stage IIa.    Dr Meaed-  Oncotype 38  DDAC from April 15, 2020 through May 27, 2020.  Taxol start Holley 10.- last dose July 22.    -staging  CT chest abdomen pelvis April 3, 2020 that was negative for metastatic disease.  Ovarian lesions were seen and a recommendation was made for transvaginal ultrasound. Dr Meade plans to do this at a later date    -port placed by me. In place as of 9-2020    - post neoadjuvant MRI showed tumor reduction from 4.4 cm to 3.3 cm. Calcifications remaining are within 5mm of the index lesion- wont need special bracketing    Pathology from August 26, 2020 left breast ultrasound-guided lumpectomy and sentinel lymph node biopsy with bilateral oncoplastics by   Lety returned as:  0 of 2 sentinel lymph nodes.  Invasive mammary carcinoma, no special type,apocrine features, 3 cm, high-grade, 3, 3, 2, unifocal, duct carcinoma in situ present, negative for extensive intraductal component.  Duct carcinoma in situ spans an area of 3 cm.  Solid, comedo and cribriform, high-grade.  Necrosis is present.  Margins are uninvolved by invasive and duct carcinoma in situ.  Closest is 6 mm from invasive and 3 mm of superior margin.  This refers to the initial lumpectomy.  Foci of lymphovascular space invasion was noted.  Estrogen 60, progesterone 10, HER-2/mary 1+, Ki-67 20%.  Pathologic stage ypT2N0. Stage IIA.  All additional margins were benign breast tissue.    Dr Ragini perez- arimidex and lupron  Radiation: Dr Mckenzie-December 1, 2020: Patient took external beam radiation therapy to the left breast in 16 fractions completed on October 29, 2020.  Boost was not given.  Dates of treatment were October 7, 2020 through October 29, 2020.        2-  Paternal cousin age 35, paternal great aunt uncertain age  [facility] breast and gyn panel returned 3-30-20 as negative for mutation    3-  Postcoital UTIs    4-5   25 # weight gain since diagnosis- 8 # weight gain since last visit- BMI 35.8  BMI 24- post 8 # weight loss 2022    Plan:  Stacy and I reviewed her interval history, imaging, imaging reports, treatments, and exam today.    She has lost 8 pounds and I saluted her on these efforts.    There is no evidence of disease based on her examination or imaging.  She continues the Arimidex and Lupron with Dr. Eid.  We will obtain her radiation oncology notes.  She is ready for her port to be removed as is Dr. Eid.  We will plan for a right port removal at the time that Dr. Davidson does her right reduction mammoplasty.  Her next routine imaging would be due May 10, 2023 at [facility].  I will arrange this and have her see Diane Lee thereafter.  We discussed the risks of port  removal, namely infection or bleeding.  Extremely low risks on both.  I will see her back for surgery          Sunni Barber MD        Return for surgery.      Today I spent 30minutes doing the following: Reviewing records, labs, outside imaging and reports in preparation for the patient visit; obtaining medical history; performing the physical exam; counseling and educating the patient and any available family or caregivers; ordering medications, tests or procedures; coordinating care with any other physicians on her care team as needed, and documenting all of the above in the medical record as well as sending communications with her other healthcare professionals.    EMR Dragon/transcription disclaimer:    Much of this encounter note is an electronic transcription/translocation of spoken language to printed text.  The electronic translation of spoken language may permit erroneous, or at times, nonsensical words or phrases to be inadvertently transcribed.  Although I have reviewed the note from such areas, some may still exist.

## 2022-09-27 ENCOUNTER — TELEPHONE (OUTPATIENT)
Dept: SURGERY | Facility: CLINIC | Age: 42
End: 2022-09-27

## 2022-09-27 NOTE — TELEPHONE ENCOUNTER
MMG at [facility] Thurs 5/11/2023 at 3 pm, arrive 20-30 minutes prior.    F/u with Diane Lee, APRN Wed 5/17/2023 at 1:40 pm.    Spoke with pt who vu and asked me to email appt information to her.    MEB

## 2022-10-05 ENCOUNTER — TELEPHONE (OUTPATIENT)
Dept: SURGERY | Facility: CLINIC | Age: 42
End: 2022-10-05

## 2022-10-05 ENCOUNTER — HOSPITAL ENCOUNTER (OUTPATIENT)
Facility: HOSPITAL | Age: 42
Setting detail: HOSPITAL OUTPATIENT SURGERY
End: 2022-10-05
Attending: SURGERY | Admitting: SURGERY

## 2022-10-05 NOTE — TELEPHONE ENCOUNTER
Surgery Wed 1/4/2023 at 11:30 am, arrive at 9:30 am.    Pt is having surgery with Dr. Davidson same day.  Dr. Davidson starts at 12:30 pm.    Spoke with pt who is aware.    Suture removal with Alejandro COLÓN

## 2022-12-20 ENCOUNTER — TELEPHONE (OUTPATIENT)
Dept: SURGERY | Facility: CLINIC | Age: 42
End: 2022-12-20

## 2022-12-20 NOTE — TELEPHONE ENCOUNTER
Jocelyn from Dr. Davidson's office let me know this pt was cancelling sx due to not having insurance and ghazal she was planning to use for surgery expiring.    Called pt to check if she wanted us to cancel our portion as well.     Pt asked me to hold off on cancelling our portion until she talked with the estimates line to determine if she is able to continue with our surgery.    Have not heard back from pt.    Have called twice for follow up.     Pt to call back and discuss.    MEB

## 2022-12-28 ENCOUNTER — APPOINTMENT (OUTPATIENT)
Dept: PREADMISSION TESTING | Facility: HOSPITAL | Age: 42
End: 2022-12-28

## 2023-01-30 ENCOUNTER — TELEPHONE (OUTPATIENT)
Dept: SURGERY | Facility: CLINIC | Age: 43
End: 2023-01-30

## 2023-01-30 NOTE — TELEPHONE ENCOUNTER
Last this pt and I talked her ghazal for surgery had  and she did not have insurance. Pt did not think she could financially afford even a portion of surgery, but asked for the estimates line to see if this was a feasible financial option for her.    I have been unable to reach the pt and have yet to receive a return call in this regard.    I did just try to call pt again today Mon 2023 at 10:45 am to see if she would be interested at this time and had to lvm as unable to speak with pt.    Pt to call us back and let us know if she wishes to proceed with sx at this time.    MEB

## 2023-02-08 ENCOUNTER — TELEPHONE (OUTPATIENT)
Dept: SURGERY | Facility: CLINIC | Age: 43
End: 2023-02-08

## 2023-02-08 NOTE — TELEPHONE ENCOUNTER
Reached back out to pt to discuss sx.    Pt let me know she never received financial assistance packet from estimates line.    Therefore I called [phone] and spoke with Julieta who let me know they would take of getting pt appropriate information.    MEB

## 2023-05-16 ENCOUNTER — TELEPHONE (OUTPATIENT)
Dept: SURGERY | Facility: CLINIC | Age: 43
End: 2023-05-16

## 2023-05-16 NOTE — TELEPHONE ENCOUNTER
MALCOLM for call back regarding coming in earlier in the day for her 5/19/23 appt with Jones Rausch.

## 2023-06-19 ENCOUNTER — TELEPHONE (OUTPATIENT)
Dept: SURGERY | Facility: CLINIC | Age: 43
End: 2023-06-19

## 2023-06-19 NOTE — TELEPHONE ENCOUNTER
Nickm for [facility] to call me back im needing the last oncology office visit notes on this patient to be faxed over before her appointment tomorrow

## 2023-06-26 PROBLEM — N95.9 PREMENOPAUSAL PATIENT: Status: ACTIVE | Noted: 2023-06-26

## 2023-08-04 NOTE — PROGRESS NOTES
HEMATOLOGY ONCOLOGY OUTPATIENT FOLLOW-UP       Patient name: Stacy Grewal  : 1980  MRN: [id number]  Primary Care Physician: Brenda Sheikh MD  Referring Physician: Brenda Sheikh MD  Reason For Consult: Breast cancer    History of Present Illness:  Patient is a 43 y.o.     2023: Ms. grewal came to this office seeking attention for the first time on this date.She had been under my care in [place], [place].  In 2020 she had noted an area of discoloration above the nipple on the left breast.  Immediately underneath she had a hard nodule.  Over time it did not seem to increase in size and was not associated to any other symptoms.  For this reason eventually she underwent a screening mammogram that reported a 3 cm high density, spiculated tumor at the 11 to 12 o'clock position.  It was associated to pleomorphic microcalcifications and prominent architectural distortion.  An ultrasound confirmed a 3.2 x 2.3 x 1 point centimeter tumor.  A stereotactic biopsy took place on 2020.  He reported a mammary carcinoma of no special type.  It was great to 2 3.  Estrogen receptors were moderately positive in 60% of the cells.  The progesterone receptors were as well strongly positive in 10% of the cells.  HER2 was negative Ki-67 was positive in 20% of the cells.  She had been seen by Dr. Sunni Barber who requested neoadjuvant chemotherapy both because of the location but also because of the size of the tumor.  She received a port.  She started treatment with AC on a dose-dense fashion, with growth factor support.  She attempted scalp cooling that she did not tolerate and she ended up with universal alopecia.  After 4 cycles of this combination she was started on paclitaxel which she received on an every 3-week schedule.  There was evidence of response with reduction in the size of the tumor.  She was taken to the operating room.  On 2020 she underwent an  ultrasound-guided lumpectomy with sentinel lymph node biopsy.  Residual invasive mammary carcinoma of no special type with apocrine features was present.  It measured 3 cm and was high-grade (3, 3, 2).  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  Of the met 2 sentinel lymph nodes submitted, neither had invasive disease.  All margins were negative.  The disease was staged as [id number].  She started ovarian suppression with leuprolide and hormonal manipulation with anastrozole.  At the time of the last visit with me she was tolerating the treatment reasonably well.    She then was seen by Dr. Prakash Escobedo and who suggested that the sertraline that she had been taking was not compatible with her treatments and switch to a different antidepressant.  This resulted in anxiety that was treated with temazepam.  A few days later she went through a difficult divorce and on 1 occasion took several tablets of temazepam.  This resulted in an admission to the hospital where she spent at least 3 days unresponsive.  Eventually she recovered.  She was placed back on the sertraline by her psychiatrist.    On the day of this visit she had several complaints.  She had become forgetful and had to write everything down.  She had a persistent headache and frequent smell of cigarette smoke even when there was none present that had been going on for several months without worsening.  She also complained of back pain with radiation to the left lower extremity along the posterior aspect of the thigh.  She had become nearly incontinent of both stool and urine but she had no melena or hematochezia.  She had continued to gain weight in spite of her efforts but he had not been able to exercise or change her diet pattern.  On exam there were no palpable lymph nodes.  The lungs were clear.  The heart regular.  Abdomen rounded, protuberant and soft.  Liver and spleen not enlarged and a tumor could not be identified.  There was no edema.  A  decision was made to continue with the same antineoplastic therapy.  She was also instructed to continue with what ever antidepressant her psychiatrist was prescribing as there was no formal contraindication of sertraline generally with any of the other SSRIs with the treatment she was receiving.  She was referred to gynecology for a Pap smear (she had not had one in more than 5 years) and for pelvic exam.  She was referred to social work to see if she could find health insurance through the health insurance exchange.    8/7/2023: Feeling somewhat better. The physical therapy has helped some. She has been eating well and without unintended weight loss. No fevers. Active and working full time. Tolerating the medication well. On exam no changes. The laboratory exams were reviewed and discussed with her. To continue with the same treatment. She is to see me in 2 months.     Subjective:  8/7/2023: Without new symptoms. Her pain is better control with the physical therapy. She sleeps well. Working full time and without limitations. No fevers or nocturnal diaphoresis. Denies chest pain or increasing dyspnea. No abdominal pain or diarrhea. No dysuria. No edema. No skin rash.     Past Medical History:   Diagnosis Date    Anemia     Anxiety     Balance problem     Breast cancer     Left    Depression     Drug therapy     History of chemotherapy     7/22/2020. SEES DR ATKINS IN [place]    History of migraine     History of neuropathy     IN FEET    History of UTI      Past Surgical History:   Procedure Laterality Date    APPENDECTOMY      BREAST BIOPSY Left     MARCH 2020. TREATMENT WITH CHEMO.COMPLETED IN JULY 2020    BREAST LUMPECTOMY WITH SENTINEL NODE BIOPSY Left 8/26/2020    Procedure: Left breast ultrasound-guided lumpectomy, left axillary sentinel lymph node biopsy;  Surgeon: Sunni Barber MD;  Location: [facility];  Service: General;  Laterality: Left;    BREAST SURGERY Bilateral 8/26/2020    Procedure:  LEFT BREAST ONCOPLASTIC CLOSURE RIGHT BREAST REDUCTION FOR SYMMETERY;  Surgeon: Angela Davidson MD;  Location: [facility] OR;  Service: Plastics;  Laterality: Bilateral;    VENOUS ACCESS DEVICE (PORT) INSERTION Right 4/7/2020    Procedure: RIGHT port placement.;  Surgeon: Sunni Barber MD;  Location: [facility] OR;  Service: General;  Laterality: Right;       Current Outpatient Medications:     anastrozole (ARIMIDEX) 1 MG tablet, , Disp: , Rfl:     sertraline (ZOLOFT) 100 MG tablet, Take 1 tablet by mouth 2 (Two) Times a Day., Disp: , Rfl:   No current facility-administered medications for this visit.    No Known Allergies    Family History   Problem Relation Age of Onset    Breast cancer Paternal Aunt         GREAT PATERNAL AUNT     Breast cancer Cousin 35        PATERNAL COUSIN     Malig Hyperthermia Neg Hx      Cancer-related family history includes Breast cancer in her paternal aunt; Breast cancer (age of onset: 35) in her cousin.    Social History     Tobacco Use    Smoking status: Never    Smokeless tobacco: Never   Vaping Use    Vaping Use: Never used   Substance Use Topics    Alcohol use: Yes     Comment: Occasional    Drug use: Never     Social History     Social History Narrative    Not on file     ROS:   Review of Systems   Constitutional:  Positive for activity change, appetite change, fatigue and unexpected weight change. Negative for chills, diaphoresis and fever.   HENT:  Negative for congestion, dental problem, drooling, ear discharge, ear pain, facial swelling, hearing loss, mouth sores, nosebleeds, postnasal drip, rhinorrhea, sinus pressure, sinus pain, sneezing, sore throat, tinnitus, trouble swallowing and voice change.    Eyes:  Negative for photophobia, pain, discharge, redness, itching and visual disturbance.   Respiratory:  Negative for apnea, cough, choking, chest tightness, shortness of breath, wheezing and stridor.    Cardiovascular:  Negative for chest pain, palpitations and  "leg swelling.   Gastrointestinal:  Negative for abdominal distention, abdominal pain, anal bleeding, blood in stool, constipation, diarrhea, nausea, rectal pain and vomiting.   Endocrine: Negative for cold intolerance, heat intolerance, polydipsia and polyuria.   Genitourinary:  Negative for decreased urine volume, difficulty urinating, dysuria, flank pain, frequency, genital sores, hematuria and urgency.   Musculoskeletal:  Positive for back pain. Negative for arthralgias, gait problem, joint swelling, myalgias, neck pain and neck stiffness.   Skin:  Negative for color change, pallor and rash.   Neurological:  Positive for headaches. Negative for dizziness, tremors, seizures, syncope, facial asymmetry, speech difficulty, weakness, light-headedness and numbness.   Hematological:  Negative for adenopathy. Does not bruise/bleed easily.   Psychiatric/Behavioral:  Negative for agitation, behavioral problems, confusion, decreased concentration, hallucinations, self-injury, sleep disturbance and suicidal ideas. The patient is not nervous/anxious.        Objective:    Vital Signs:  Vitals:    08/07/23 1435   BP: 128/94   Pulse: 76   Temp: 97.4 øF (36.3 øC)   TempSrc: Oral   SpO2: 97%   Weight: 95 kg (209 lb 6.4 oz)   Height: 160 cm (63\")   PainSc:   6   PainLoc: Back  Comment: lower back and left sciatic down leg     Body mass index is 37.09 kg/mý.    ECOG  (0) Fully active, able to carry on all predisease performance without restriction    Physical Exam:   Physical Exam  Constitutional:       General: She is not in acute distress.     Appearance: She is ill-appearing. She is not toxic-appearing or diaphoretic.      Comments: Well-built, well oriented and in no distress.  Conversant and in good spirits.  BMI greater than 37 kg/mý.   HENT:      Head: Normocephalic and atraumatic.      Right Ear: External ear normal.      Left Ear: External ear normal.      Nose: Nose normal.      Mouth/Throat:      Mouth: Mucous membranes " are moist.      Pharynx: Oropharynx is clear. No oropharyngeal exudate or posterior oropharyngeal erythema.   Eyes:      General: No scleral icterus.        Right eye: No discharge.         Left eye: No discharge.      Conjunctiva/sclera: Conjunctivae normal.      Pupils: Pupils are equal, round, and reactive to light.   Cardiovascular:      Rate and Rhythm: Normal rate and regular rhythm.      Pulses: Normal pulses.      Heart sounds: No murmur heard.    No friction rub. No gallop.   Pulmonary:      Effort: No respiratory distress.      Breath sounds: No stridor. No wheezing, rhonchi or rales.   Abdominal:      General: Bowel sounds are normal. There is no distension.      Palpations: Abdomen is soft. There is no mass.      Tenderness: There is no abdominal tenderness. There is no right CVA tenderness, left CVA tenderness, guarding or rebound.      Hernia: No hernia is present.      Comments: Rounded, protuberant, soft and nontender.  Liver and spleen do not appear enlarged.  No tumors are present.   Musculoskeletal:         General: No swelling, tenderness, deformity or signs of injury.      Cervical back: No rigidity.      Right lower leg: No edema.      Left lower leg: No edema.   Lymphadenopathy:      Cervical: No cervical adenopathy.   Skin:     Coloration: Skin is not jaundiced.      Findings: No bruising, lesion or rash.   Neurological:      General: No focal deficit present.      Mental Status: She is alert and oriented to person, place, and time.      Cranial Nerves: No cranial nerve deficit.      Motor: No weakness.      Gait: Gait normal.   Psychiatric:         Mood and Affect: Mood normal.         Behavior: Behavior normal.         Thought Content: Thought content normal.         Judgment: Judgment normal.   MATT Chew MD performed the physical exam on 8/7/2023 as documented above.     Lab Results   Component Value Date    GLUCOSE 80 08/07/2023    BUN 6 08/07/2023    CREATININE 0.74 08/07/2023     EGFRIFNONA 84 08/24/2020    BCR 8.1 08/07/2023    K 4.1 08/07/2023    CO2 28.0 08/07/2023    CALCIUM 9.8 08/07/2023    ALBUMIN 4.7 08/07/2023    LABIL2 1.3 07/22/2020    AST 30 08/07/2023    ALT 28 08/07/2023     Assessment & Plan     1.  ypT2 N0 M0 estrogen receptor and progesterone receptor positive, HER2 negative moderately differentiated invasive carcinoma of the left breast.  Underwent neoadjuvant chemotherapy with AC, followed by paclitaxel.  Had a partial response to treatment and this was followed by lumpectomy and sentinel lymph node biopsy, radiation therapy and ovarian suppression and hormonal manipulation with anastrozole.  Remains free of any suggestion of recurrent disease. Her alkaline phosphatase has increased. Will recheck and investigate if necessary.   2.  Memory difficulties: Could certainly be the result of the treatment that she has received although it appears to be more prominent and persistent than usual.  At this time no change. Will continue to monitor. This is not generally interfering with her work.   3.  Back pain with sciatica pattern that has not increased over several months.  Likely related to her weight.  Physical therapy seems to be providing relief. Will continue for now.   4.  Fecal and urinary tract near incontinence: Awaiting investigations from gynecology.   5.  Continue same. She will see me in approximately 2 months.     Good Chew MD on 8/7/2032 at 16:48

## 2023-08-07 ENCOUNTER — OFFICE VISIT (OUTPATIENT)
Dept: ONCOLOGY | Facility: CLINIC | Age: 43
End: 2023-08-07

## 2023-08-07 ENCOUNTER — HOSPITAL ENCOUNTER (OUTPATIENT)
Dept: ONCOLOGY | Facility: HOSPITAL | Age: 43
Discharge: HOME | End: 2023-08-07

## 2023-08-07 ENCOUNTER — LAB (OUTPATIENT)
Dept: LAB | Facility: HOSPITAL | Age: 43
End: 2023-08-07

## 2023-08-07 VITALS
SYSTOLIC BLOOD PRESSURE: 128 MMHG | WEIGHT: 209.4 LBS | OXYGEN SATURATION: 97 % | DIASTOLIC BLOOD PRESSURE: 94 MMHG | BODY MASS INDEX: 37.1 KG/M2 | HEART RATE: 76 BPM | HEIGHT: 63 IN | TEMPERATURE: 97.4 F

## 2023-08-07 DIAGNOSIS — Z17.0 CARCINOMA OF UPPER-INNER QUADRANT OF LEFT BREAST IN FEMALE, ESTROGEN RECEPTOR POSITIVE: ICD-10-CM

## 2023-08-07 DIAGNOSIS — N95.9 PREMENOPAUSAL PATIENT: Primary | ICD-10-CM

## 2023-08-07 DIAGNOSIS — C50.212 CARCINOMA OF UPPER-INNER QUADRANT OF LEFT BREAST IN FEMALE, ESTROGEN RECEPTOR POSITIVE: ICD-10-CM

## 2023-08-07 DIAGNOSIS — C50.212 CARCINOMA OF UPPER-INNER QUADRANT OF LEFT BREAST IN FEMALE, ESTROGEN RECEPTOR POSITIVE: Primary | ICD-10-CM

## 2023-08-07 DIAGNOSIS — Z17.0 CARCINOMA OF UPPER-INNER QUADRANT OF LEFT BREAST IN FEMALE, ESTROGEN RECEPTOR POSITIVE: Primary | ICD-10-CM

## 2023-08-07 LAB
ALBUMIN SERPL-MCNC: 4.7 G/DL (ref 3.5–5.2)
ALBUMIN/GLOB SERPL: 1.4 G/DL
ALP SERPL-CCNC: 195 U/L (ref 39–117)
ALT SERPL W P-5'-P-CCNC: 28 U/L (ref 1–33)
ANION GAP SERPL CALCULATED.3IONS-SCNC: 11 MMOL/L (ref 5–15)
AST SERPL-CCNC: 30 U/L (ref 1–32)
BASOPHILS # BLD AUTO: 0.03 10*3/MM3 (ref 0–0.2)
BASOPHILS NFR BLD AUTO: 0.4 % (ref 0–1.5)
BILIRUB SERPL-MCNC: 0.3 MG/DL (ref 0–1.2)
BUN SERPL-MCNC: 6 MG/DL (ref 6–20)
BUN/CREAT SERPL: 8.1 (ref 7–25)
CALCIUM SPEC-SCNC: 9.8 MG/DL (ref 8.6–10.5)
CHLORIDE SERPL-SCNC: 101 MMOL/L (ref 98–107)
CO2 SERPL-SCNC: 28 MMOL/L (ref 22–29)
CREAT SERPL-MCNC: 0.74 MG/DL (ref 0.57–1)
DEPRECATED RDW RBC AUTO: 39.1 FL (ref 37–54)
EGFRCR SERPLBLD CKD-EPI 2021: 104.4 ML/MIN/1.73
EOSINOPHIL # BLD AUTO: 0.23 10*3/MM3 (ref 0–0.4)
EOSINOPHIL NFR BLD AUTO: 2.8 % (ref 0.3–6.2)
ERYTHROCYTE [DISTWIDTH] IN BLOOD BY AUTOMATED COUNT: 13.1 % (ref 12.3–15.4)
GLOBULIN UR ELPH-MCNC: 3.3 GM/DL
GLUCOSE SERPL-MCNC: 80 MG/DL (ref 65–99)
HCT VFR BLD AUTO: 41.1 % (ref 34–46.6)
HGB BLD-MCNC: 13.9 G/DL (ref 12–15.9)
HOLD SPECIMEN: NORMAL
HOLD SPECIMEN: NORMAL
LYMPHOCYTES # BLD AUTO: 2.27 10*3/MM3 (ref 0.7–3.1)
LYMPHOCYTES NFR BLD AUTO: 27.3 % (ref 19.6–45.3)
MCH RBC QN AUTO: 28.3 PG (ref 26.6–33)
MCHC RBC AUTO-ENTMCNC: 33.8 G/DL (ref 31.5–35.7)
MCV RBC AUTO: 83.5 FL (ref 79–97)
MONOCYTES # BLD AUTO: 0.56 10*3/MM3 (ref 0.1–0.9)
MONOCYTES NFR BLD AUTO: 6.7 % (ref 5–12)
NEUTROPHILS NFR BLD AUTO: 5.23 10*3/MM3 (ref 1.7–7)
NEUTROPHILS NFR BLD AUTO: 62.8 % (ref 42.7–76)
PLATELET # BLD AUTO: 217 10*3/MM3 (ref 140–450)
PMV BLD AUTO: 9.1 FL (ref 6–12)
POTASSIUM SERPL-SCNC: 4.1 MMOL/L (ref 3.5–5.2)
PROT SERPL-MCNC: 8 G/DL (ref 6–8.5)
RBC # BLD AUTO: 4.92 10*6/MM3 (ref 3.77–5.28)
SODIUM SERPL-SCNC: 140 MMOL/L (ref 136–145)
WBC NRBC COR # BLD: 8.32 10*3/MM3 (ref 3.4–10.8)

## 2023-08-07 PROCEDURE — 85025 COMPLETE CBC W/AUTO DIFF WBC: CPT

## 2023-08-07 PROCEDURE — 96402 CHEMO HORMON ANTINEOPL SQ/IM: CPT

## 2023-08-07 PROCEDURE — 80053 COMPREHEN METABOLIC PANEL: CPT | Performed by: INTERNAL MEDICINE

## 2023-08-07 PROCEDURE — 36415 COLL VENOUS BLD VENIPUNCTURE: CPT

## 2023-08-07 PROCEDURE — 25010000002 LEUPROLIDE ACETATE (3 MONTH) PER 3.75 MG: Performed by: INTERNAL MEDICINE

## 2023-08-07 RX ADMIN — LEUPROLIDE ACETATE 11.25 MG: KIT at 15:44

## 2023-08-07 NOTE — PROGRESS NOTES
Pt brought back from MD side for Lupron injection.  Injection given and pt tolerated.  Pt declined AVS, but v/u of next appointment date and time.  Pt discharged from clinic.

## 2023-08-22 DIAGNOSIS — T14.8XXA MUSCLE STRAIN: Primary | ICD-10-CM

## 2023-10-26 NOTE — PROGRESS NOTES
HEMATOLOGY ONCOLOGY OUTPATIENT FOLLOW-UP       Patient name: Stacy Grewal  : 1980  MRN: [id number]  Primary Care Physician: Brenda Sheikh MD  Referring Physician: No ref. provider found  Reason For Consult: Breast cancer    History of Present Illness:  Patient is a 43 y.o.     2023: Ms. grewal came to this office seeking attention for the first time on this date.She had been under my care in [place], [place].  In 2020 she had noted an area of discoloration above the nipple on the left breast.  Immediately underneath she had a hard nodule.  Over time it did not seem to increase in size and was not associated to any other symptoms.  For this reason eventually she underwent a screening mammogram that reported a 3 cm high density, spiculated tumor at the 11 to 12 o'clock position.  It was associated to pleomorphic microcalcifications and prominent architectural distortion.  An ultrasound confirmed a 3.2 x 2.3 x 1 point centimeter tumor.  A stereotactic biopsy took place on 2020.  He reported a mammary carcinoma of no special type.  It was great to 2 3.  Estrogen receptors were moderately positive in 60% of the cells.  The progesterone receptors were as well strongly positive in 10% of the cells.  HER2 was negative Ki-67 was positive in 20% of the cells.  She had been seen by Dr. Sunni Barber who requested neoadjuvant chemotherapy both because of the location but also because of the size of the tumor.  She received a port.  She started treatment with AC on a dose-dense fashion, with growth factor support.  She attempted scalp cooling that she did not tolerate and she ended up with universal alopecia.  After 4 cycles of this combination she was started on paclitaxel which she received on an every 3-week schedule.  There was evidence of response with reduction in the size of the tumor.  She was taken to the operating room.  On 2020 she underwent an  ultrasound-guided lumpectomy with sentinel lymph node biopsy.  Residual invasive mammary carcinoma of no special type with apocrine features was present.  It measured 3 cm and was high-grade (3, 3, 2).  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  Of the met 2 sentinel lymph nodes submitted, neither had invasive disease.  All margins were negative.  The disease was staged as [id number].  She started ovarian suppression with leuprolide and hormonal manipulation with anastrozole.  At the time of the last visit with me she was tolerating the treatment reasonably well.    She then was seen by Dr. Prakash Escobedo and who suggested that the sertraline that she had been taking was not compatible with her treatments and switch to a different antidepressant.  This resulted in anxiety that was treated with temazepam.  A few days later she went through a difficult divorce and on 1 occasion took several tablets of temazepam.  This resulted in an admission to the hospital where she spent at least 3 days unresponsive.  Eventually she recovered.  She was placed back on the sertraline by her psychiatrist.    On the day of this visit she had several complaints.  She had become forgetful and had to write everything down.  She had a persistent headache and frequent smell of cigarette smoke even when there was none present that had been going on for several months without worsening.  She also complained of back pain with radiation to the left lower extremity along the posterior aspect of the thigh.  She had become nearly incontinent of both stool and urine but she had no melena or hematochezia.  She had continued to gain weight in spite of her efforts but he had not been able to exercise or change her diet pattern.  On exam there were no palpable lymph nodes.  The lungs were clear.  The heart regular.  Abdomen rounded, protuberant and soft.  Liver and spleen not enlarged and a tumor could not be identified.  There was no edema.  A  decision was made to continue with the same antineoplastic therapy.  She was also instructed to continue with what ever antidepressant her psychiatrist was prescribing as there was no formal contraindication of sertraline generally with any of the other SSRIs with the treatment she was receiving.  She was referred to gynecology for a Pap smear (she had not had one in more than 5 years) and for pelvic exam.  She was referred to social work to see if she could find health insurance through the health insurance exchange.    8/7/2023: Feeling somewhat better. The physical therapy has helped some. She has been eating well and without unintended weight loss. No fevers. Active and working full time. Tolerating the medication well. On exam no changes. The laboratory exams were reviewed and discussed with her. To continue with the same treatment. She is to see me in 2 months.     10/30/2023: Has not been feeling particularly well.  Has had a persistent headache that is not unusual for her but has been long-lasting.  She also has noted a small nodule on her scalp to increase in size.  She is eating well and her weight continues to go up.  She has had no chest pains and no more dyspnea than usual.  No abdominal pain.  She has maintained regular bowel activity and she has no dysuria.  No skin rash.  On exam no changes.  She does have a small nodule on the skin of the scalp on the right parietal region, a few centimeters posterior to the frontal region.  There are no associated skin changes and it is hard to palpation.  It does not appear to be a malignant deposit but I am not entirely sure.  The lungs are clear.  The heart is regular.  The abdomen is protuberant, soft and without hepatomegaly or splenomegaly.  There is no edema.  There is no skin rash.  I offered her referral to surgery for sampling of the subcutaneous nodule on the scalp though I am not convinced that it is malignant.  She is leery of that and anxious with  procedures.  We will postpone and see again the next time.  Her liver enzymes have persistently been elevated and I have asked her to allow me to do a CT of the abdomen to evaluate her liver.  I suspect steatohepatitis.  Discussed with her.    Subjective:  10/30/2023: No new symptoms.  Is active and working full-time as before.  No chest pains or cough.  No abdominal pain or diarrhea or dysuria.  She has had a persistent headache and as above reports a subcutaneous nodule that she recently seemed to think it was increasing in size.    Past Medical History:   Diagnosis Date    Anemia     Anxiety     Balance problem     Breast cancer     Left    Depression     Drug therapy     History of chemotherapy     7/22/2020. SEES DR ATKINS IN [place]    History of migraine     History of neuropathy     IN FEET    History of UTI      Past Surgical History:   Procedure Laterality Date    APPENDECTOMY      BREAST BIOPSY Left     MARCH 2020. TREATMENT WITH CHEMO.COMPLETED IN JULY 2020    BREAST LUMPECTOMY WITH SENTINEL NODE BIOPSY Left 8/26/2020    Procedure: Left breast ultrasound-guided lumpectomy, left axillary sentinel lymph node biopsy;  Surgeon: Sunni Barber MD;  Location: [facility];  Service: General;  Laterality: Left;    BREAST SURGERY Bilateral 8/26/2020    Procedure: LEFT BREAST ONCOPLASTIC CLOSURE RIGHT BREAST REDUCTION FOR SYMMETERY;  Surgeon: Angela Davidson MD;  Location: [facility];  Service: Plastics;  Laterality: Bilateral;    VENOUS ACCESS DEVICE (PORT) INSERTION Right 4/7/2020    Procedure: RIGHT port placement.;  Surgeon: Sunni Barber MD;  Location: [facility];  Service: General;  Laterality: Right;       Current Outpatient Medications:     anastrozole (ARIMIDEX) 1 MG tablet, , Disp: , Rfl:     sertraline (ZOLOFT) 100 MG tablet, Take 1 tablet by mouth 2 (Two) Times a Day., Disp: , Rfl:     No Known Allergies    Family History   Problem Relation Age of Onset    Breast cancer  Paternal Aunt         GREAT PATERNAL AUNT     Breast cancer Cousin 35        PATERNAL COUSIN     [place] Hyperthermia Neg Hx      Cancer-related family history includes Breast cancer in her paternal aunt; Breast cancer (age of onset: 35) in her cousin.    Social History     Tobacco Use    Smoking status: Never    Smokeless tobacco: Never   Vaping Use    Vaping Use: Never used   Substance Use Topics    Alcohol use: Yes     Comment: Occasional    Drug use: Never     Social History     Social History Narrative    Not on file     ROS:   Review of Systems   Constitutional:  Positive for activity change, appetite change, fatigue and unexpected weight change. Negative for chills, diaphoresis and fever.   HENT:  Negative for congestion, dental problem, drooling, ear discharge, ear pain, facial swelling, hearing loss, mouth sores, nosebleeds, postnasal drip, rhinorrhea, sinus pressure, sinus pain, sneezing, sore throat, tinnitus, trouble swallowing and voice change.    Eyes:  Negative for photophobia, pain, discharge, redness, itching and visual disturbance.   Respiratory:  Negative for apnea, cough, choking, chest tightness, shortness of breath, wheezing and stridor.    Cardiovascular:  Negative for chest pain, palpitations and leg swelling.   Gastrointestinal:  Negative for abdominal distention, abdominal pain, anal bleeding, blood in stool, constipation, diarrhea, nausea, rectal pain and vomiting.   Endocrine: Negative for cold intolerance, heat intolerance, polydipsia and polyuria.   Genitourinary:  Negative for decreased urine volume, difficulty urinating, dysuria, flank pain, frequency, genital sores, hematuria and urgency.   Musculoskeletal:  Positive for back pain. Negative for arthralgias, gait problem, joint swelling, myalgias, neck pain and neck stiffness.   Skin:  Negative for color change, pallor and rash.   Neurological:  Positive for headaches. Negative for dizziness, tremors, seizures, syncope, facial  "asymmetry, speech difficulty, weakness, light-headedness and numbness.   Hematological:  Negative for adenopathy. Does not bruise/bleed easily.   Psychiatric/Behavioral:  Negative for agitation, behavioral problems, confusion, decreased concentration, hallucinations, self-injury, sleep disturbance and suicidal ideas. The patient is not nervous/anxious.      Objective:    Vital Signs:  Vitals:    10/30/23 1520   BP: 133/85   Pulse: 73   Temp: 97.7 °F (36.5 °C)   TempSrc: Oral   SpO2: 98%   Weight: 97.5 kg (215 lb)   Height: 160 cm (63\")   PainSc: 0-No pain       Body mass index is 38.09 kg/m².    ECOG  (0) Fully active, able to carry on all predisease performance without restriction    Physical Exam:   Physical Exam  Constitutional:       General: She is not in acute distress.     Appearance: She is ill-appearing. She is not toxic-appearing or diaphoretic.      Comments: Well-built, well oriented and in no distress.  Conversant and in good spirits.  BMI greater than 37 kg/m².   HENT:      Head: Normocephalic and atraumatic.      Right Ear: External ear normal.      Left Ear: External ear normal.      Nose: Nose normal.      Mouth/Throat:      Mouth: Mucous membranes are moist.      Pharynx: Oropharynx is clear. No oropharyngeal exudate or posterior oropharyngeal erythema.   Eyes:      General: No scleral icterus.        Right eye: No discharge.         Left eye: No discharge.      Conjunctiva/sclera: Conjunctivae normal.      Pupils: Pupils are equal, round, and reactive to light.   Cardiovascular:      Rate and Rhythm: Normal rate and regular rhythm.      Pulses: Normal pulses.      Heart sounds: No murmur heard.     No friction rub. No gallop.   Pulmonary:      Effort: No respiratory distress.      Breath sounds: No stridor. No wheezing, rhonchi or rales.   Abdominal:      General: Bowel sounds are normal. There is no distension.      Palpations: Abdomen is soft. There is no mass.      Tenderness: There is no " abdominal tenderness. There is no right CVA tenderness, left CVA tenderness, guarding or rebound.      Hernia: No hernia is present.      Comments: Rounded, protuberant, soft and nontender.  Liver and spleen do not appear enlarged.  No tumors are present.   Musculoskeletal:         General: No swelling, tenderness, deformity or signs of injury.      Cervical back: No rigidity.      Right lower leg: No edema.      Left lower leg: No edema.   Lymphadenopathy:      Cervical: No cervical adenopathy.   Skin:     Coloration: Skin is not jaundiced.      Findings: No bruising, lesion or rash.      Comments: There is a small subcutaneous nodule in the scalp, at the parietal region on the right, very close to the midline and somewhat posterior to the frontal region.  It is well-circumscribed and hard to palpation.   Neurological:      General: No focal deficit present.      Mental Status: She is alert and oriented to person, place, and time.      Cranial Nerves: No cranial nerve deficit.      Motor: No weakness.      Gait: Gait normal.   Psychiatric:         Mood and Affect: Mood normal.         Behavior: Behavior normal.         Thought Content: Thought content normal.         Judgment: Judgment normal.     MATT Chew MD performed the physical exam on 10/30/2023 as documented above.    Lab Results   Component Value Date    GLUCOSE 80 08/07/2023    BUN 6 08/07/2023    CREATININE 0.74 08/07/2023    EGFRIFNONA 84 08/24/2020    BCR 8.1 08/07/2023    K 4.1 08/07/2023    CO2 28.0 08/07/2023    CALCIUM 9.8 08/07/2023    ALBUMIN 4.7 08/07/2023    LABIL2 1.3 07/22/2020    AST 30 08/07/2023    ALT 28 08/07/2023     Assessment & Plan     1.  ypT2 N0 M0 estrogen receptor and progesterone receptor positive, HER2 negative moderately differentiated invasive carcinoma of the left breast.  Underwent neoadjuvant chemotherapy with AC, followed by paclitaxel.  Had a partial response to treatment and this was followed by lumpectomy and  sentinel lymph node biopsy, radiation therapy and ovarian suppression and hormonal manipulation with anastrozole.  Free of any clear suggestion of recurrent disease.  The small subcutaneous nodule on the scalp will be followed and sampled if necessary.  I will obtain a CT scan of her abdomen to investigate the persistently abnormal liver enzymes.  Particularly her alkaline phosphatase has increased.  I suspect steatohepatitis more than anything else.  2.  Memory difficulties: No change.  3.  Back pain with sciatica pattern that has not increased over several months.  Unchanged.  Physical therapy seemed to provide relief.  4.  Fecal and urinary tract near incontinence: Following with gynecology.  5.  She will see me with results in approximately 6 weeks.  Discussed with her.    Good Chew MD on 10/30/2023 at 1606.

## 2023-10-30 ENCOUNTER — OFFICE VISIT (OUTPATIENT)
Dept: ONCOLOGY | Facility: CLINIC | Age: 43
End: 2023-10-30
Payer: MEDICAID

## 2023-10-30 ENCOUNTER — HOSPITAL ENCOUNTER (OUTPATIENT)
Dept: ONCOLOGY | Facility: HOSPITAL | Age: 43
Discharge: HOME | End: 2023-10-30

## 2023-10-30 ENCOUNTER — LAB (OUTPATIENT)
Dept: LAB | Facility: HOSPITAL | Age: 43
End: 2023-10-30
Payer: MEDICAID

## 2023-10-30 VITALS
DIASTOLIC BLOOD PRESSURE: 85 MMHG | SYSTOLIC BLOOD PRESSURE: 133 MMHG | BODY MASS INDEX: 38.09 KG/M2 | TEMPERATURE: 97.7 F | HEART RATE: 73 BPM | WEIGHT: 215 LBS | HEIGHT: 63 IN | OXYGEN SATURATION: 98 %

## 2023-10-30 DIAGNOSIS — C50.212 CARCINOMA OF UPPER-INNER QUADRANT OF LEFT BREAST IN FEMALE, ESTROGEN RECEPTOR POSITIVE: Primary | ICD-10-CM

## 2023-10-30 DIAGNOSIS — Z17.0 CARCINOMA OF UPPER-INNER QUADRANT OF LEFT BREAST IN FEMALE, ESTROGEN RECEPTOR POSITIVE: ICD-10-CM

## 2023-10-30 DIAGNOSIS — C50.212 CARCINOMA OF UPPER-INNER QUADRANT OF LEFT BREAST IN FEMALE, ESTROGEN RECEPTOR POSITIVE: ICD-10-CM

## 2023-10-30 DIAGNOSIS — N95.9 PREMENOPAUSAL PATIENT: Primary | ICD-10-CM

## 2023-10-30 DIAGNOSIS — Z17.0 CARCINOMA OF UPPER-INNER QUADRANT OF LEFT BREAST IN FEMALE, ESTROGEN RECEPTOR POSITIVE: Primary | ICD-10-CM

## 2023-10-30 LAB
ALBUMIN SERPL-MCNC: 4.6 G/DL (ref 3.5–5.2)
ALBUMIN/GLOB SERPL: 1.8 G/DL
ALP SERPL-CCNC: 147 U/L (ref 39–117)
ALT SERPL W P-5'-P-CCNC: 21 U/L (ref 1–33)
ANION GAP SERPL CALCULATED.3IONS-SCNC: 7 MMOL/L (ref 5–15)
AST SERPL-CCNC: 27 U/L (ref 1–32)
BASOPHILS # BLD AUTO: 0.02 10*3/MM3 (ref 0–0.2)
BASOPHILS NFR BLD AUTO: 0.3 % (ref 0–1.5)
BILIRUB SERPL-MCNC: 0.4 MG/DL (ref 0–1.2)
BUN SERPL-MCNC: 10 MG/DL (ref 6–20)
BUN/CREAT SERPL: 10.2 (ref 7–25)
CALCIUM SPEC-SCNC: 9.7 MG/DL (ref 8.6–10.5)
CHLORIDE SERPL-SCNC: 103 MMOL/L (ref 98–107)
CO2 SERPL-SCNC: 31 MMOL/L (ref 22–29)
CREAT SERPL-MCNC: 0.98 MG/DL (ref 0.57–1)
DEPRECATED RDW RBC AUTO: 41.3 FL (ref 37–54)
EGFRCR SERPLBLD CKD-EPI 2021: 74.5 ML/MIN/1.73
EOSINOPHIL # BLD AUTO: 0.22 10*3/MM3 (ref 0–0.4)
EOSINOPHIL NFR BLD AUTO: 3 % (ref 0.3–6.2)
ERYTHROCYTE [DISTWIDTH] IN BLOOD BY AUTOMATED COUNT: 13.4 % (ref 12.3–15.4)
GLOBULIN UR ELPH-MCNC: 2.5 GM/DL
GLUCOSE SERPL-MCNC: 89 MG/DL (ref 65–99)
HCT VFR BLD AUTO: 39.3 % (ref 34–46.6)
HGB BLD-MCNC: 13.1 G/DL (ref 12–15.9)
HOLD SPECIMEN: NORMAL
LYMPHOCYTES # BLD AUTO: 1.5 10*3/MM3 (ref 0.7–3.1)
LYMPHOCYTES NFR BLD AUTO: 20.5 % (ref 19.6–45.3)
MCH RBC QN AUTO: 28.6 PG (ref 26.6–33)
MCHC RBC AUTO-ENTMCNC: 33.3 G/DL (ref 31.5–35.7)
MCV RBC AUTO: 85.8 FL (ref 79–97)
MONOCYTES # BLD AUTO: 0.37 10*3/MM3 (ref 0.1–0.9)
MONOCYTES NFR BLD AUTO: 5.1 % (ref 5–12)
NEUTROPHILS NFR BLD AUTO: 5.19 10*3/MM3 (ref 1.7–7)
NEUTROPHILS NFR BLD AUTO: 71.1 % (ref 42.7–76)
PLATELET # BLD AUTO: 210 10*3/MM3 (ref 140–450)
PMV BLD AUTO: 8.8 FL (ref 6–12)
POTASSIUM SERPL-SCNC: 4.5 MMOL/L (ref 3.5–5.2)
PROT SERPL-MCNC: 7.1 G/DL (ref 6–8.5)
RBC # BLD AUTO: 4.58 10*6/MM3 (ref 3.77–5.28)
SODIUM SERPL-SCNC: 141 MMOL/L (ref 136–145)
WBC NRBC COR # BLD: 7.3 10*3/MM3 (ref 3.4–10.8)

## 2023-10-30 PROCEDURE — 36415 COLL VENOUS BLD VENIPUNCTURE: CPT

## 2023-10-30 PROCEDURE — 85025 COMPLETE CBC W/AUTO DIFF WBC: CPT

## 2023-10-30 PROCEDURE — 80053 COMPREHEN METABOLIC PANEL: CPT | Performed by: INTERNAL MEDICINE

## 2023-10-30 PROCEDURE — 25010000002 LEUPROLIDE ACETATE (3 MONTH) PER 3.75 MG: Performed by: INTERNAL MEDICINE

## 2023-10-30 PROCEDURE — 96402 CHEMO HORMON ANTINEOPL SQ/IM: CPT

## 2023-10-30 RX ADMIN — LEUPROLIDE ACETATE 11.25 MG: KIT at 16:25

## 2023-10-30 NOTE — PROGRESS NOTES
Pt here for scheduled visit with Dr Chew and Geovani,  pt has right sided infusaport in place,  she reports has not been flushed in at least 18 months,  pt reports Dr Barber placed infusaport,  reviewed with Dr Chew and he will discuss with pt at next visit,  reviewed with pt and v/u

## 2023-12-05 ENCOUNTER — HOSPITAL ENCOUNTER (OUTPATIENT)
Dept: PET IMAGING | Facility: HOSPITAL | Age: 43
Discharge: HOME | End: 2023-12-05
Admitting: INTERNAL MEDICINE
Payer: MEDICAID

## 2023-12-05 DIAGNOSIS — Z17.0 CARCINOMA OF UPPER-INNER QUADRANT OF LEFT BREAST IN FEMALE, ESTROGEN RECEPTOR POSITIVE: ICD-10-CM

## 2023-12-05 DIAGNOSIS — C50.212 CARCINOMA OF UPPER-INNER QUADRANT OF LEFT BREAST IN FEMALE, ESTROGEN RECEPTOR POSITIVE: ICD-10-CM

## 2023-12-05 PROCEDURE — 74177 CT ABD & PELVIS W/CONTRAST: CPT

## 2023-12-05 PROCEDURE — 25510000001 IOPAMIDOL PER 1 ML: Performed by: INTERNAL MEDICINE

## 2023-12-05 RX ADMIN — IOPAMIDOL 100 ML: 755 INJECTION, SOLUTION INTRAVENOUS at 10:44

## 2023-12-12 NOTE — PROGRESS NOTES
HEMATOLOGY ONCOLOGY OUTPATIENT FOLLOW-UP       Patient name: Stacy Grewal  : 1980  MRN: [id number]  Primary Care Physician: Brenda Sheikh MD  Referring Physician: Brenda Sheikh MD  Reason For Consult: Breast cancer    History of Present Illness:  Patient is a 43 y.o.     2023: Ms. grewal came to this office seeking attention for the first time on this date.She had been under my care in [place], [place].  In 2020 she had noted an area of discoloration above the nipple on the left breast.  Immediately underneath she had a hard nodule.  Over time it did not seem to increase in size and was not associated to any other symptoms.  For this reason eventually she underwent a screening mammogram that reported a 3 cm high density, spiculated tumor at the 11 to 12 o'clock position.  It was associated to pleomorphic microcalcifications and prominent architectural distortion.  An ultrasound confirmed a 3.2 x 2.3 x 1 point centimeter tumor.  A stereotactic biopsy took place on 2020.  He reported a mammary carcinoma of no special type.  It was great to 2 3.  Estrogen receptors were moderately positive in 60% of the cells.  The progesterone receptors were as well strongly positive in 10% of the cells.  HER2 was negative Ki-67 was positive in 20% of the cells.  She had been seen by Dr. Sunni Barber who requested neoadjuvant chemotherapy both because of the location but also because of the size of the tumor.  She received a port.  She started treatment with AC on a dose-dense fashion, with growth factor support.  She attempted scalp cooling that she did not tolerate and she ended up with universal alopecia.  After 4 cycles of this combination she was started on paclitaxel which she received on an every 3-week schedule.  There was evidence of response with reduction in the size of the tumor.  She was taken to the operating room.  On 2020 she underwent an  ultrasound-guided lumpectomy with sentinel lymph node biopsy.  Residual invasive mammary carcinoma of no special type with apocrine features was present.  It measured 3 cm and was high-grade (3, 3, 2).  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  Of the met 2 sentinel lymph nodes submitted, neither had invasive disease.  All margins were negative.  The disease was staged as [id number].  She started ovarian suppression with leuprolide and hormonal manipulation with anastrozole.  At the time of the last visit with me she was tolerating the treatment reasonably well.    She then was seen by Dr. Prakash Escobedo and who suggested that the sertraline that she had been taking was not compatible with her treatments and switch to a different antidepressant.  This resulted in anxiety that was treated with temazepam.  A few days later she went through a difficult divorce and on 1 occasion took several tablets of temazepam.  This resulted in an admission to the hospital where she spent at least 3 days unresponsive.  Eventually she recovered.  She was placed back on the sertraline by her psychiatrist.    On the day of this visit she had several complaints.  She had become forgetful and had to write everything down.  She had a persistent headache and frequent smell of cigarette smoke even when there was none present that had been going on for several months without worsening.  She also complained of back pain with radiation to the left lower extremity along the posterior aspect of the thigh.  She had become nearly incontinent of both stool and urine but she had no melena or hematochezia.  She had continued to gain weight in spite of her efforts but he had not been able to exercise or change her diet pattern.  On exam there were no palpable lymph nodes.  The lungs were clear.  The heart regular.  Abdomen rounded, protuberant and soft.  Liver and spleen not enlarged and a tumor could not be identified.  There was no edema.  A  decision was made to continue with the same antineoplastic therapy.  She was also instructed to continue with what ever antidepressant her psychiatrist was prescribing as there was no formal contraindication of sertraline generally with any of the other SSRIs with the treatment she was receiving.  She was referred to gynecology for a Pap smear (she had not had one in more than 5 years) and for pelvic exam.  She was referred to social work to see if she could find health insurance through the health insurance exchange.    8/7/2023: Feeling somewhat better. The physical therapy has helped some. She has been eating well and without unintended weight loss. No fevers. Active and working full time. Tolerating the medication well. On exam no changes. The laboratory exams were reviewed and discussed with her. To continue with the same treatment. She is to see me in 2 months.     10/30/2023: Has not been feeling particularly well.  Has had a persistent headache that is not unusual for her but has been long-lasting.  She also has noted a small nodule on her scalp to increase in size.  She is eating well and her weight continues to go up.  She has had no chest pains and no more dyspnea than usual.  No abdominal pain.  She has maintained regular bowel activity and she has no dysuria.  No skin rash.  On exam no changes.  She does have a small nodule on the skin of the scalp on the right parietal region, a few centimeters posterior to the frontal region.  There are no associated skin changes and it is hard to palpation.  It does not appear to be a malignant deposit but I am not entirely sure.  The lungs are clear.  The heart is regular.  The abdomen is protuberant, soft and without hepatomegaly or splenomegaly.  There is no edema.  There is no skin rash.  I offered her referral to surgery for sampling of the subcutaneous nodule on the scalp though I am not convinced that it is malignant.  She is leery of that and anxious with  procedures.  We will postpone and see again the next time.  Her liver enzymes have persistently been elevated and I have asked her to allow me to do a CT of the abdomen to evaluate her liver.  I suspect steatohepatitis.  Discussed with her.    12/14/2023: Feeling about the same.  Frequently tired and frequently with headache but able to perform all her duties.  She sleeps poorly but not any different than usual.  She continues to eat reasonably well and has had no nausea, vomiting or unintended weight loss.  She has been free of pain.  On exam she is alert, conversant and in no distress.  She is well-oriented.  The lungs are clear.  The heart is regular.  The abdomen is protuberant but soft and without hepatomegaly or splenomegaly.  The laboratory exams were reviewed and discussed with her.  I reviewed independently the images of her scans and discussed with her.  No suggestion of metastatic disease.  She continues to tolerate the treatment with ovarian suppression and an aromatase inhibitor well.  Her last estradiol, in June of this year was suppressed.  Will continue to monitor    Subjective:  12/14/2023: Remains asymptomatic for the most part.  She has some chronic complaints, including fatigue and frequent headaches but they are not any different than usual.  She is also not impaired from doing her usual activities and full-time work.  She is eating well and has had no weight loss.  She has been afebrile and without nocturnal diaphoresis.  Denies chest pains but complains of dyspnea at times.  No back pain.  No abdominal pain.  Has maintained regular bowel activity and denies diarrhea or constipation.  No dysuria.  No vaginal bleeding.    Past Medical History:   Diagnosis Date    Anemia     Anxiety     Balance problem     Breast cancer     Left    Depression     Drug therapy     History of chemotherapy     7/22/2020. SEES DR ATKINS IN [place]    History of migraine     History of neuropathy     IN FEET     History of UTI      Past Surgical History:   Procedure Laterality Date    APPENDECTOMY      BREAST BIOPSY Left     MARCH 2020. TREATMENT WITH CHEMO.COMPLETED IN JULY 2020    BREAST LUMPECTOMY WITH SENTINEL NODE BIOPSY Left 8/26/2020    Procedure: Left breast ultrasound-guided lumpectomy, left axillary sentinel lymph node biopsy;  Surgeon: Sunni Barber MD;  Location: [facility];  Service: General;  Laterality: Left;    BREAST SURGERY Bilateral 8/26/2020    Procedure: LEFT BREAST ONCOPLASTIC CLOSURE RIGHT BREAST REDUCTION FOR SYMMETERY;  Surgeon: Angela Davidson MD;  Location: [facility];  Service: Plastics;  Laterality: Bilateral;    VENOUS ACCESS DEVICE (PORT) INSERTION Right 4/7/2020    Procedure: RIGHT port placement.;  Surgeon: Sunni Barber MD;  Location: [facility];  Service: General;  Laterality: Right;       Current Outpatient Medications:     anastrozole (ARIMIDEX) 1 MG tablet, , Disp: , Rfl:     sertraline (ZOLOFT) 100 MG tablet, Take 1 tablet by mouth 2 (Two) Times a Day., Disp: , Rfl:     No Known Allergies    Family History   Problem Relation Age of Onset    Breast cancer Paternal Aunt         GREAT PATERNAL AUNT     Breast cancer Cousin 35        PATERNAL COUSIN     Malig Hyperthermia Neg Hx      Cancer-related family history includes Breast cancer in her paternal aunt; Breast cancer (age of onset: 35) in her cousin.    Social History     Tobacco Use    Smoking status: Never    Smokeless tobacco: Never   Vaping Use    Vaping Use: Never used   Substance Use Topics    Alcohol use: Yes     Comment: Occasional    Drug use: Never     Social History     Social History Narrative    Not on file     ROS:   Review of Systems   Constitutional:  Positive for activity change, appetite change, fatigue and unexpected weight change. Negative for chills, diaphoresis and fever.   HENT:  Negative for congestion, dental problem, drooling, ear discharge, ear pain, facial swelling, hearing  "loss, mouth sores, nosebleeds, postnasal drip, rhinorrhea, sinus pressure, sinus pain, sneezing, sore throat, tinnitus, trouble swallowing and voice change.    Eyes:  Negative for photophobia, pain, discharge, redness, itching and visual disturbance.   Respiratory:  Negative for apnea, cough, choking, chest tightness, shortness of breath, wheezing and stridor.    Cardiovascular:  Negative for chest pain, palpitations and leg swelling.   Gastrointestinal:  Negative for abdominal distention, abdominal pain, anal bleeding, blood in stool, constipation, diarrhea, nausea, rectal pain and vomiting.   Endocrine: Negative for cold intolerance, heat intolerance, polydipsia and polyuria.   Genitourinary:  Negative for decreased urine volume, difficulty urinating, dysuria, flank pain, frequency, genital sores, hematuria and urgency.   Musculoskeletal:  Positive for back pain. Negative for arthralgias, gait problem, joint swelling, myalgias, neck pain and neck stiffness.   Skin:  Negative for color change, pallor and rash.   Neurological:  Positive for headaches. Negative for dizziness, tremors, seizures, syncope, facial asymmetry, speech difficulty, weakness, light-headedness and numbness.   Hematological:  Negative for adenopathy. Does not bruise/bleed easily.   Psychiatric/Behavioral:  Negative for agitation, behavioral problems, confusion, decreased concentration, hallucinations, self-injury, sleep disturbance and suicidal ideas. The patient is not nervous/anxious.      Objective:    Vital Signs:  Vitals:    12/14/23 1510   BP: 121/85   Pulse: 83   SpO2: 97%   Weight: 95.5 kg (210 lb 9.6 oz)   Height: 160 cm (63\")   PainSc: 0-No pain         Body mass index is 37.31 kg/m².    ECOG  (0) Fully active, able to carry on all predisease performance without restriction    Physical Exam:   Physical Exam  Constitutional:       General: She is not in acute distress.     Appearance: She is ill-appearing. She is not toxic-appearing or " diaphoretic.      Comments: Well-built, well oriented and in no distress.  Conversant and in good spirits.  BMI greater than 37 kg/m².   HENT:      Head: Normocephalic and atraumatic.      Right Ear: External ear normal.      Left Ear: External ear normal.      Nose: Nose normal.      Mouth/Throat:      Mouth: Mucous membranes are moist.      Pharynx: Oropharynx is clear. No oropharyngeal exudate or posterior oropharyngeal erythema.   Eyes:      General: No scleral icterus.        Right eye: No discharge.         Left eye: No discharge.      Conjunctiva/sclera: Conjunctivae normal.      Pupils: Pupils are equal, round, and reactive to light.   Cardiovascular:      Rate and Rhythm: Normal rate and regular rhythm.      Pulses: Normal pulses.      Heart sounds: No murmur heard.     No friction rub. No gallop.   Pulmonary:      Effort: No respiratory distress.      Breath sounds: No stridor. No wheezing, rhonchi or rales.   Abdominal:      General: Bowel sounds are normal. There is no distension.      Palpations: Abdomen is soft. There is no mass.      Tenderness: There is no abdominal tenderness. There is no right CVA tenderness, left CVA tenderness, guarding or rebound.      Hernia: No hernia is present.      Comments: Rounded, protuberant, soft and nontender.  Liver and spleen do not appear enlarged.  No tumors are present.   Musculoskeletal:         General: No swelling, tenderness, deformity or signs of injury.      Cervical back: No rigidity.      Right lower leg: No edema.      Left lower leg: No edema.   Lymphadenopathy:      Cervical: No cervical adenopathy.   Skin:     Coloration: Skin is not jaundiced.      Findings: No bruising, lesion or rash.      Comments: The subcutaneous nodule in the scalp is unchanged.   Neurological:      General: No focal deficit present.      Mental Status: She is alert and oriented to person, place, and time.      Cranial Nerves: No cranial nerve deficit.      Motor: No weakness.       Gait: Gait normal.   Psychiatric:         Mood and Affect: Mood normal.         Behavior: Behavior normal.         Thought Content: Thought content normal.         Judgment: Judgment normal.     I Good Chew MD performed the physical exam on 12/14/2023 as documented above.    Lab Results   Component Value Date    GLUCOSE 89 10/30/2023    BUN 10 10/30/2023    CREATININE 0.98 10/30/2023    EGFRIFNONA 84 08/24/2020    BCR 10.2 10/30/2023    K 4.5 10/30/2023    CO2 31.0 (H) 10/30/2023    CALCIUM 9.7 10/30/2023    ALBUMIN 4.6 10/30/2023    LABIL2 1.3 07/22/2020    AST 27 10/30/2023    ALT 21 10/30/2023     Assessment & Plan     1.  ypT2 N0 M0 estrogen receptor and progesterone receptor positive, HER2 negative moderately differentiated invasive carcinoma of the left breast.  Underwent neoadjuvant chemotherapy with AC, followed by paclitaxel.  Had a partial response to treatment and this was followed by lumpectomy and sentinel lymph node biopsy, radiation therapy and ovarian suppression and hormonal manipulation with anastrozole.  Free of any clear suggestion of recurrent disease.  The small subcutaneous nodule on the scalp will be followed and sampled if necessary.  I will obtain a CT scan of her abdomen to investigate the persistently abnormal liver enzymes.  Particularly her alkaline phosphatase has increased.  I suspect steatohepatitis more than anything else.  2.  Memory difficulties: No change.  3.  Back pain with sciatica pattern that has not increased over several months.  Unchanged.  Physical therapy seemed to provide relief.  4.  Fecal and urinary tract near incontinence: Following with gynecology.  5.  She will see me with results in approximately 6 weeks.  Discussed with her.    Good Chew MD on 12/14/2023 at 1626.

## 2023-12-14 ENCOUNTER — LAB (OUTPATIENT)
Dept: LAB | Facility: HOSPITAL | Age: 43
End: 2023-12-14
Payer: MEDICAID

## 2023-12-14 ENCOUNTER — OFFICE VISIT (OUTPATIENT)
Dept: ONCOLOGY | Facility: CLINIC | Age: 43
End: 2023-12-14

## 2023-12-14 VITALS
DIASTOLIC BLOOD PRESSURE: 85 MMHG | WEIGHT: 210.6 LBS | OXYGEN SATURATION: 97 % | HEIGHT: 63 IN | HEART RATE: 83 BPM | BODY MASS INDEX: 37.32 KG/M2 | SYSTOLIC BLOOD PRESSURE: 121 MMHG

## 2023-12-14 DIAGNOSIS — C50.212 CARCINOMA OF UPPER-INNER QUADRANT OF LEFT BREAST IN FEMALE, ESTROGEN RECEPTOR POSITIVE: Primary | ICD-10-CM

## 2023-12-14 DIAGNOSIS — Z17.0 CARCINOMA OF UPPER-INNER QUADRANT OF LEFT BREAST IN FEMALE, ESTROGEN RECEPTOR POSITIVE: Primary | ICD-10-CM

## 2023-12-14 LAB
BASOPHILS # BLD AUTO: 0.02 10*3/MM3 (ref 0–0.2)
BASOPHILS NFR BLD AUTO: 0.3 % (ref 0–1.5)
DEPRECATED RDW RBC AUTO: 39.2 FL (ref 37–54)
EOSINOPHIL # BLD AUTO: 0.23 10*3/MM3 (ref 0–0.4)
EOSINOPHIL NFR BLD AUTO: 3 % (ref 0.3–6.2)
ERYTHROCYTE [DISTWIDTH] IN BLOOD BY AUTOMATED COUNT: 12.9 % (ref 12.3–15.4)
HCT VFR BLD AUTO: 39.8 % (ref 34–46.6)
HGB BLD-MCNC: 13.2 G/DL (ref 12–15.9)
HOLD SPECIMEN: NORMAL
HOLD SPECIMEN: NORMAL
LYMPHOCYTES # BLD AUTO: 2.01 10*3/MM3 (ref 0.7–3.1)
LYMPHOCYTES NFR BLD AUTO: 25.8 % (ref 19.6–45.3)
MCH RBC QN AUTO: 28 PG (ref 26.6–33)
MCHC RBC AUTO-ENTMCNC: 33.2 G/DL (ref 31.5–35.7)
MCV RBC AUTO: 84.5 FL (ref 79–97)
MONOCYTES # BLD AUTO: 0.47 10*3/MM3 (ref 0.1–0.9)
MONOCYTES NFR BLD AUTO: 6 % (ref 5–12)
NEUTROPHILS NFR BLD AUTO: 5.06 10*3/MM3 (ref 1.7–7)
NEUTROPHILS NFR BLD AUTO: 64.9 % (ref 42.7–76)
PLATELET # BLD AUTO: 217 10*3/MM3 (ref 140–450)
PMV BLD AUTO: 8.9 FL (ref 6–12)
RBC # BLD AUTO: 4.71 10*6/MM3 (ref 3.77–5.28)
WBC NRBC COR # BLD AUTO: 7.79 10*3/MM3 (ref 3.4–10.8)

## 2023-12-14 PROCEDURE — 85025 COMPLETE CBC W/AUTO DIFF WBC: CPT

## 2023-12-14 PROCEDURE — 36415 COLL VENOUS BLD VENIPUNCTURE: CPT

## 2024-01-22 ENCOUNTER — HOSPITAL ENCOUNTER (OUTPATIENT)
Dept: ONCOLOGY | Facility: HOSPITAL | Age: 44
Discharge: HOME | End: 2024-01-22
Admitting: INTERNAL MEDICINE

## 2024-01-22 VITALS
RESPIRATION RATE: 16 BRPM | OXYGEN SATURATION: 96 % | DIASTOLIC BLOOD PRESSURE: 84 MMHG | WEIGHT: 215.3 LBS | HEIGHT: 63 IN | SYSTOLIC BLOOD PRESSURE: 136 MMHG | BODY MASS INDEX: 38.15 KG/M2 | HEART RATE: 93 BPM

## 2024-01-22 DIAGNOSIS — Z17.0 CARCINOMA OF UPPER-INNER QUADRANT OF LEFT BREAST IN FEMALE, ESTROGEN RECEPTOR POSITIVE: ICD-10-CM

## 2024-01-22 DIAGNOSIS — N95.9 PREMENOPAUSAL PATIENT: Primary | ICD-10-CM

## 2024-01-22 DIAGNOSIS — C50.212 CARCINOMA OF UPPER-INNER QUADRANT OF LEFT BREAST IN FEMALE, ESTROGEN RECEPTOR POSITIVE: ICD-10-CM

## 2024-01-22 PROCEDURE — 96402 CHEMO HORMON ANTINEOPL SQ/IM: CPT

## 2024-01-22 PROCEDURE — 25010000002 LEUPROLIDE ACETATE (3 MONTH) PER 3.75 MG: Performed by: INTERNAL MEDICINE

## 2024-01-22 RX ADMIN — LEUPROLIDE ACETATE 11.25 MG: KIT at 15:57

## 2024-03-05 RX ORDER — ANASTROZOLE 1 MG/1
1 TABLET ORAL DAILY
Qty: 30 TABLET | Refills: 2 | Status: SHIPPED | OUTPATIENT
Start: 2024-03-05

## 2024-03-05 NOTE — TELEPHONE ENCOUNTER
Caller: Stacy Grewal    Relationship: Self    Best call back number: [phone]    Requested Prescriptions:   Requested Prescriptions     Pending Prescriptions Disp Refills    anastrozole (ARIMIDEX) 1 MG tablet          Pharmacy where request should be sent: [id number] - [place], [place] - [address] [phone] [facility] [phone] FX     Last office visit with prescribing clinician: 12/14/2023   Last telemedicine visit with prescribing clinician: Visit date not found   Next office visit with prescribing clinician: 4/15/2024     Additional details provided by patient: PT UNSURE OF PREVIOUS PROVIDER     Does the patient have less than a 3 day supply:  [x] Yes  [] No    Would you like a call back once the refill request has been completed: [] Yes [x] No    If the office needs to give you a call back, can they leave a voicemail: [] Yes [x] No

## 2024-04-15 NOTE — PROGRESS NOTES
HEMATOLOGY ONCOLOGY OUTPATIENT FOLLOW-UP       Patient name: Stacy Grewal  : 1980  MRN: [id number]  Primary Care Physician: Brenda Sheikh MD  Referring Physician: No ref. provider found  Reason For Consult: Breast cancer    History of Present Illness:  Patient is a 44 y.o.     2023: Ms. grewal came to this office seeking attention for the first time on this date.She had been under my care in [place], [place].  In 2020 she had noted an area of discoloration above the nipple on the left breast.  Immediately underneath she had a hard nodule.  Over time it did not seem to increase in size and was not associated to any other symptoms.  For this reason eventually she underwent a screening mammogram that reported a 3 cm high density, spiculated tumor at the 11 to 12 o'clock position.  It was associated to pleomorphic microcalcifications and prominent architectural distortion.  An ultrasound confirmed a 3.2 x 2.3 x 1 point centimeter tumor.  A stereotactic biopsy took place on 2020.  He reported a mammary carcinoma of no special type.  It was great to 2 3.  Estrogen receptors were moderately positive in 60% of the cells.  The progesterone receptors were as well strongly positive in 10% of the cells.  HER2 was negative Ki-67 was positive in 20% of the cells.  She had been seen by Dr. Sunni Barber who requested neoadjuvant chemotherapy both because of the location but also because of the size of the tumor.  She received a port.  She started treatment with AC on a dose-dense fashion, with growth factor support.  She attempted scalp cooling that she did not tolerate and she ended up with universal alopecia.  After 4 cycles of this combination she was started on paclitaxel which she received on an every 3-week schedule.  There was evidence of response with reduction in the size of the tumor.  She was taken to the operating room.  On 2020 she underwent an  ultrasound-guided lumpectomy with sentinel lymph node biopsy.  Residual invasive mammary carcinoma of no special type with apocrine features was present.  It measured 3 cm and was high-grade (3, 3, 2).  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  Of the met 2 sentinel lymph nodes submitted, neither had invasive disease.  All margins were negative.  The disease was staged as [id number].  She started ovarian suppression with leuprolide and hormonal manipulation with anastrozole.  At the time of the last visit with me she was tolerating the treatment reasonably well.    She then was seen by Dr. Prakash Escobedo and who suggested that the sertraline that she had been taking was not compatible with her treatments and switch to a different antidepressant.  This resulted in anxiety that was treated with temazepam.  A few days later she went through a difficult divorce and on 1 occasion took several tablets of temazepam.  This resulted in an admission to the hospital where she spent at least 3 days unresponsive.  Eventually she recovered.  She was placed back on the sertraline by her psychiatrist.    On the day of this visit she had several complaints.  She had become forgetful and had to write everything down.  She had a persistent headache and frequent smell of cigarette smoke even when there was none present that had been going on for several months without worsening.  She also complained of back pain with radiation to the left lower extremity along the posterior aspect of the thigh.  She had become nearly incontinent of both stool and urine but she had no melena or hematochezia.  She had continued to gain weight in spite of her efforts but he had not been able to exercise or change her diet pattern.  On exam there were no palpable lymph nodes.  The lungs were clear.  The heart regular.  Abdomen rounded, protuberant and soft.  Liver and spleen not enlarged and a tumor could not be identified.  There was no edema.  A  decision was made to continue with the same antineoplastic therapy.  She was also instructed to continue with what ever antidepressant her psychiatrist was prescribing as there was no formal contraindication of sertraline generally with any of the other SSRIs with the treatment she was receiving.  She was referred to gynecology for a Pap smear (she had not had one in more than 5 years) and for pelvic exam.  She was referred to social work to see if she could find health insurance through the health insurance exchange.    8/7/2023: Feeling somewhat better. The physical therapy has helped some. She has been eating well and without unintended weight loss. No fevers. Active and working full time. Tolerating the medication well. On exam no changes. The laboratory exams were reviewed and discussed with her. To continue with the same treatment. She is to see me in 2 months.     10/30/2023: Has not been feeling particularly well.  Has had a persistent headache that is not unusual for her but has been long-lasting.  She also has noted a small nodule on her scalp to increase in size.  She is eating well and her weight continues to go up.  She has had no chest pains and no more dyspnea than usual.  No abdominal pain.  She has maintained regular bowel activity and she has no dysuria.  No skin rash.  On exam no changes.  She does have a small nodule on the skin of the scalp on the right parietal region, a few centimeters posterior to the frontal region.  There are no associated skin changes and it is hard to palpation.  It does not appear to be a malignant deposit but I am not entirely sure.  The lungs are clear.  The heart is regular.  The abdomen is protuberant, soft and without hepatomegaly or splenomegaly.  There is no edema.  There is no skin rash.  I offered her referral to surgery for sampling of the subcutaneous nodule on the scalp though I am not convinced that it is malignant.  She is leery of that and anxious with  "procedures.  We will postpone and see again the next time.  Her liver enzymes have persistently been elevated and I have asked her to allow me to do a CT of the abdomen to evaluate her liver.  I suspect steatohepatitis.  Discussed with her.    12/14/2023: Feeling about the same.  Frequently tired and frequently with headache but able to perform all her duties.  She sleeps poorly but not any different than usual.  She continues to eat reasonably well and has had no nausea, vomiting or unintended weight loss.  She has been free of pain.  On exam she is alert, conversant and in no distress.  She is well-oriented.  The lungs are clear.  The heart is regular.  The abdomen is protuberant but soft and without hepatomegaly or splenomegaly.  The laboratory exams were reviewed and discussed with her.  I reviewed independently the images of her scans and discussed with her.  No suggestion of metastatic disease.  She continues to tolerate the treatment with ovarian suppression and an aromatase inhibitor well.  Her last estradiol, in June of this year was suppressed.  Will continue to monitor    Subjective:  4/16/2024: Stacy is here for her routine follow-up and leuprolide injection.  She reports that she is on her aromatase inhibitor and tolerates it well overall.  She does have some \"brain fog\" and fatigue.  He was started on Strattera by her PCP with movement and her focus.  She is compliant with self breast exam and notes no changes.  No vaginal bleeding.  He has a subcutaneous nodule on the scalp that we have been following and she does feel that it is increasing in size.      Past Medical History:   Diagnosis Date    Anemia     Anxiety     Balance problem     Breast cancer     Left    Depression     Drug therapy     History of chemotherapy     7/22/2020. SEES DR ATK[place]    History of migraine     History of neuropathy     IN FEET    History of UTI      Past Surgical History:   Procedure Laterality Date    " APPENDECTOMY      BREAST BIOPSY Left     MARCH 2020. TREATMENT WITH CHEMO.COMPLETED IN JULY 2020    BREAST LUMPECTOMY WITH SENTINEL NODE BIOPSY Left 8/26/2020    Procedure: Left breast ultrasound-guided lumpectomy, left axillary sentinel lymph node biopsy;  Surgeon: Sunni Barber MD;  Location: [facility];  Service: General;  Laterality: Left;    BREAST SURGERY Bilateral 8/26/2020    Procedure: LEFT BREAST ONCOPLASTIC CLOSURE RIGHT BREAST REDUCTION FOR SYMMETERY;  Surgeon: Angela Davidson MD;  Location: [facility];  Service: Plastics;  Laterality: Bilateral;    VENOUS ACCESS DEVICE (PORT) INSERTION Right 4/7/2020    Procedure: RIGHT port placement.;  Surgeon: Sunni Barber MD;  Location: [facility];  Service: General;  Laterality: Right;       Current Outpatient Medications:     anastrozole (ARIMIDEX) 1 MG tablet, Take 1 tablet by mouth Daily., Disp: 30 tablet, Rfl: 2    atomoxetine (STRATTERA) 40 MG capsule, Take 1 capsule by mouth Daily., Disp: , Rfl:     methocarbamol (ROBAXIN) 750 MG tablet, TAKE 1 TABLET BY MOUTH EVERY 8 HOURS AS NEEDED FOR MUSCLE RELAXATION, Disp: , Rfl:     sertraline (ZOLOFT) 100 MG tablet, Take 1 tablet by mouth 2 (Two) Times a Day., Disp: , Rfl:   No current facility-administered medications for this visit.    No Known Allergies    Family History   Problem Relation Age of Onset    Breast cancer Paternal Aunt         GREAT PATERNAL AUNT     Breast cancer Cousin 35        PATERNAL COUSIN     Malig Hyperthermia Neg Hx      Cancer-related family history includes Breast cancer in her paternal aunt; Breast cancer (age of onset: 35) in her cousin.    Social History     Tobacco Use    Smoking status: Never    Smokeless tobacco: Never   Vaping Use    Vaping status: Never Used   Substance Use Topics    Alcohol use: Yes     Comment: Occasional    Drug use: Never     Social History     Social History Narrative    Not on file     ROS:   Review of Systems   Constitutional:  " Positive for activity change and fatigue. Negative for appetite change, chills, diaphoresis, fever and unexpected weight change.   HENT:  Negative for congestion, dental problem, drooling, ear discharge, ear pain, facial swelling, hearing loss, mouth sores, nosebleeds, postnasal drip, rhinorrhea, sinus pressure, sinus pain, sneezing, sore throat, tinnitus, trouble swallowing and voice change.    Eyes:  Negative for photophobia, pain, discharge, redness, itching and visual disturbance.   Respiratory:  Negative for apnea, cough, choking, chest tightness, shortness of breath, wheezing and stridor.    Cardiovascular:  Negative for chest pain, palpitations and leg swelling.   Gastrointestinal:  Negative for abdominal distention, abdominal pain, anal bleeding, blood in stool, constipation, diarrhea, nausea, rectal pain and vomiting.   Endocrine: Negative for cold intolerance, heat intolerance, polydipsia and polyuria.   Genitourinary:  Negative for decreased urine volume, difficulty urinating, dysuria, flank pain, frequency, genital sores, hematuria and urgency.   Musculoskeletal:  Positive for back pain. Negative for arthralgias, gait problem, joint swelling, myalgias, neck pain and neck stiffness.   Skin:  Negative for color change, pallor and rash.   Neurological:  Positive for headaches. Negative for dizziness, tremors, seizures, syncope, facial asymmetry, speech difficulty, weakness, light-headedness and numbness.   Hematological:  Negative for adenopathy. Does not bruise/bleed easily.   Psychiatric/Behavioral:  Negative for agitation, behavioral problems, confusion, decreased concentration, hallucinations, self-injury, sleep disturbance and suicidal ideas. The patient is not nervous/anxious.      Objective:    Vital Signs:  Vitals:    04/16/24 1347   BP: 133/92   Pulse: 89   SpO2: 96%   Weight: 91.4 kg (201 lb 6.4 oz)   Height: 160 cm (63\")   PainSc: 0-No pain           Body mass index is 35.68 kg/m².    ECOG  (0) " Fully active, able to carry on all predisease performance without restriction    Physical Exam:   Physical Exam  Constitutional:       General: She is not in acute distress.     Appearance: She is ill-appearing. She is not toxic-appearing or diaphoretic.      Comments: Well-built, well oriented and in no distress.  Conversant and in good spirits.  BMI greater than 37 kg/m².   HENT:      Head: Normocephalic and atraumatic.      Right Ear: External ear normal.      Left Ear: External ear normal.      Nose: Nose normal.      Mouth/Throat:      Mouth: Mucous membranes are moist.      Pharynx: Oropharynx is clear. No oropharyngeal exudate or posterior oropharyngeal erythema.   Eyes:      General: No scleral icterus.        Right eye: No discharge.         Left eye: No discharge.      Conjunctiva/sclera: Conjunctivae normal.      Pupils: Pupils are equal, round, and reactive to light.   Cardiovascular:      Rate and Rhythm: Normal rate and regular rhythm.      Pulses: Normal pulses.      Heart sounds: No murmur heard.     No friction rub. No gallop.   Pulmonary:      Effort: No respiratory distress.      Breath sounds: No stridor. No wheezing, rhonchi or rales.   Abdominal:      General: Bowel sounds are normal. There is no distension.      Palpations: Abdomen is soft. There is no mass.      Tenderness: There is no abdominal tenderness. There is no right CVA tenderness, left CVA tenderness, guarding or rebound.      Hernia: No hernia is present.      Comments: Rounded, protuberant, soft and nontender.  Liver and spleen do not appear enlarged.  No tumors are present.   Musculoskeletal:         General: No swelling, tenderness, deformity or signs of injury.      Cervical back: No rigidity.      Right lower leg: No edema.      Left lower leg: No edema.   Lymphadenopathy:      Cervical: No cervical adenopathy.   Skin:     Coloration: Skin is not jaundiced.      Findings: No bruising, lesion or rash.      Comments: The  subcutaneous nodule in the scalp is palpable.  Soft and mobile.   Neurological:      General: No focal deficit present.      Mental Status: She is alert and oriented to person, place, and time.      Cranial Nerves: No cranial nerve deficit.      Motor: No weakness.      Gait: Gait normal.   Psychiatric:         Mood and Affect: Mood normal.         Behavior: Behavior normal.         Thought Content: Thought content normal.         Judgment: Judgment normal.         Lab Results   Component Value Date    GLUCOSE 100 (H) 04/16/2024    BUN 6 04/16/2024    CREATININE 0.78 04/16/2024    EGFRIFNONA 84 08/24/2020    BCR 7.7 04/16/2024    K 4.0 04/16/2024    CO2 28.0 04/16/2024    CALCIUM 9.8 04/16/2024    ALBUMIN 4.6 04/16/2024    LABIL2 1.3 07/22/2020    AST 25 04/16/2024    ALT 23 04/16/2024     Assessment & Plan     1.  ypT2 N0 M0 estrogen receptor and progesterone receptor positive, HER2 negative moderately differentiated invasive carcinoma of the left breast.  Underwent neoadjuvant chemotherapy with AC, followed by paclitaxel.  Had a partial response to treatment and this was followed by lumpectomy and sentinel lymph node biopsy, radiation therapy and ovarian suppression and hormonal manipulation with anastrozole.  Free of any clear suggestion of recurrent disease.  The small subcutaneous nodule on the scalp will be followed and sampled if necessary.  Will refer to dermatology for further investigation due to the patient feeling that the subcutaneous nodule on the scalp is increasing.  Reviewed her CT of the abdomen and pelvis which did not show any liver dysfunction in light of her alkaline phosphatase elevation.  CMP today.  She did have a right ovarian cyst for which she will continue to follow with her GYN.  2.  Fatigue/memory difficulties: Improvement in her focus with Strattera.  Will check thyroid functions, B12, vitamin D levels due to fatigue.  3.  Back pain with sciatica pattern that has not increased over  several months.  Unchanged.  Physical therapy seemed to provide relief.  4.  Fecal and urinary tract near incontinence: Following with gynecology. Dr. Smalls and Mari.  Patient reports recent Pap smear for which she needs some kind of follow-up but is unsure what testing is being done.  We will request the note.  5.  Follow-up with Dr. Chew in 3 months with Lupron injection.  Timeframe per patient request as she does not want to return to every 6 weeks due to distance travel.

## 2024-04-16 ENCOUNTER — OFFICE VISIT (OUTPATIENT)
Dept: ONCOLOGY | Facility: CLINIC | Age: 44
End: 2024-04-16
Payer: COMMERCIAL

## 2024-04-16 ENCOUNTER — LAB (OUTPATIENT)
Dept: LAB | Facility: HOSPITAL | Age: 44
End: 2024-04-16
Payer: COMMERCIAL

## 2024-04-16 ENCOUNTER — HOSPITAL ENCOUNTER (OUTPATIENT)
Dept: ONCOLOGY | Facility: HOSPITAL | Age: 44
Discharge: HOME | End: 2024-04-16
Payer: COMMERCIAL

## 2024-04-16 VITALS
BODY MASS INDEX: 35.68 KG/M2 | WEIGHT: 201.4 LBS | HEIGHT: 63 IN | OXYGEN SATURATION: 96 % | HEART RATE: 89 BPM | DIASTOLIC BLOOD PRESSURE: 92 MMHG | SYSTOLIC BLOOD PRESSURE: 133 MMHG

## 2024-04-16 DIAGNOSIS — C50.212 CARCINOMA OF UPPER-INNER QUADRANT OF LEFT BREAST IN FEMALE, ESTROGEN RECEPTOR POSITIVE: Primary | ICD-10-CM

## 2024-04-16 DIAGNOSIS — R53.83 FATIGUE, UNSPECIFIED TYPE: ICD-10-CM

## 2024-04-16 DIAGNOSIS — Z17.0 CARCINOMA OF UPPER-INNER QUADRANT OF LEFT BREAST IN FEMALE, ESTROGEN RECEPTOR POSITIVE: Primary | ICD-10-CM

## 2024-04-16 DIAGNOSIS — E55.9 VITAMIN D DEFICIENCY: ICD-10-CM

## 2024-04-16 DIAGNOSIS — Z17.0 CARCINOMA OF UPPER-INNER QUADRANT OF LEFT BREAST IN FEMALE, ESTROGEN RECEPTOR POSITIVE: ICD-10-CM

## 2024-04-16 DIAGNOSIS — R22.0 NODULE OF SKIN OF HEAD: ICD-10-CM

## 2024-04-16 DIAGNOSIS — C50.212 CARCINOMA OF UPPER-INNER QUADRANT OF LEFT BREAST IN FEMALE, ESTROGEN RECEPTOR POSITIVE: ICD-10-CM

## 2024-04-16 DIAGNOSIS — N95.9 PREMENOPAUSAL PATIENT: Primary | ICD-10-CM

## 2024-04-16 LAB
25(OH)D3 SERPL-MCNC: 40 NG/ML (ref 30–100)
ALBUMIN SERPL-MCNC: 4.6 G/DL (ref 3.5–5.2)
ALBUMIN/GLOB SERPL: 1.5 G/DL
ALP SERPL-CCNC: 163 U/L (ref 39–117)
ALT SERPL W P-5'-P-CCNC: 23 U/L (ref 1–33)
ANION GAP SERPL CALCULATED.3IONS-SCNC: 11 MMOL/L (ref 5–15)
AST SERPL-CCNC: 25 U/L (ref 1–32)
BASOPHILS # BLD AUTO: 0.03 10*3/MM3 (ref 0–0.2)
BASOPHILS NFR BLD AUTO: 0.4 % (ref 0–1.5)
BILIRUB SERPL-MCNC: 0.4 MG/DL (ref 0–1.2)
BUN SERPL-MCNC: 6 MG/DL (ref 6–20)
BUN/CREAT SERPL: 7.7 (ref 7–25)
CALCIUM SPEC-SCNC: 9.8 MG/DL (ref 8.6–10.5)
CHLORIDE SERPL-SCNC: 99 MMOL/L (ref 98–107)
CO2 SERPL-SCNC: 28 MMOL/L (ref 22–29)
CREAT SERPL-MCNC: 0.78 MG/DL (ref 0.57–1)
DEPRECATED RDW RBC AUTO: 39.8 FL (ref 37–54)
EGFRCR SERPLBLD CKD-EPI 2021: 97.4 ML/MIN/1.73
EOSINOPHIL # BLD AUTO: 0.26 10*3/MM3 (ref 0–0.4)
EOSINOPHIL NFR BLD AUTO: 3.3 % (ref 0.3–6.2)
ERYTHROCYTE [DISTWIDTH] IN BLOOD BY AUTOMATED COUNT: 13.3 % (ref 12.3–15.4)
GLOBULIN UR ELPH-MCNC: 3.1 GM/DL
GLUCOSE SERPL-MCNC: 100 MG/DL (ref 65–99)
HCT VFR BLD AUTO: 41 % (ref 34–46.6)
HGB BLD-MCNC: 14 G/DL (ref 12–15.9)
HOLD SPECIMEN: NORMAL
LYMPHOCYTES # BLD AUTO: 2.22 10*3/MM3 (ref 0.7–3.1)
LYMPHOCYTES NFR BLD AUTO: 27.8 % (ref 19.6–45.3)
MCH RBC QN AUTO: 28.7 PG (ref 26.6–33)
MCHC RBC AUTO-ENTMCNC: 34.1 G/DL (ref 31.5–35.7)
MCV RBC AUTO: 84 FL (ref 79–97)
MONOCYTES # BLD AUTO: 0.45 10*3/MM3 (ref 0.1–0.9)
MONOCYTES NFR BLD AUTO: 5.6 % (ref 5–12)
NEUTROPHILS NFR BLD AUTO: 5.02 10*3/MM3 (ref 1.7–7)
NEUTROPHILS NFR BLD AUTO: 62.9 % (ref 42.7–76)
PLATELET # BLD AUTO: 197 10*3/MM3 (ref 140–450)
PMV BLD AUTO: 9.1 FL (ref 6–12)
POTASSIUM SERPL-SCNC: 4 MMOL/L (ref 3.5–5.2)
PROT SERPL-MCNC: 7.7 G/DL (ref 6–8.5)
RBC # BLD AUTO: 4.88 10*6/MM3 (ref 3.77–5.28)
SODIUM SERPL-SCNC: 138 MMOL/L (ref 136–145)
T-UPTAKE NFR SERPL: 1.14 TBI (ref 0.8–1.3)
T4 SERPL-MCNC: 6.53 MCG/DL (ref 4.5–11.7)
TSH SERPL DL<=0.05 MIU/L-ACNC: 6.09 UIU/ML (ref 0.27–4.2)
VIT B12 BLD-MCNC: 342 PG/ML (ref 211–946)
WBC NRBC COR # BLD AUTO: 7.98 10*3/MM3 (ref 3.4–10.8)

## 2024-04-16 PROCEDURE — 96402 CHEMO HORMON ANTINEOPL SQ/IM: CPT

## 2024-04-16 PROCEDURE — 82306 VITAMIN D 25 HYDROXY: CPT | Performed by: NURSE PRACTITIONER

## 2024-04-16 PROCEDURE — 84479 ASSAY OF THYROID (T3 OR T4): CPT | Performed by: NURSE PRACTITIONER

## 2024-04-16 PROCEDURE — 80053 COMPREHEN METABOLIC PANEL: CPT | Performed by: NURSE PRACTITIONER

## 2024-04-16 PROCEDURE — 99214 OFFICE O/P EST MOD 30 MIN: CPT | Performed by: NURSE PRACTITIONER

## 2024-04-16 PROCEDURE — 84443 ASSAY THYROID STIM HORMONE: CPT | Performed by: NURSE PRACTITIONER

## 2024-04-16 PROCEDURE — 85025 COMPLETE CBC W/AUTO DIFF WBC: CPT | Performed by: NURSE PRACTITIONER

## 2024-04-16 PROCEDURE — 82607 VITAMIN B-12: CPT | Performed by: NURSE PRACTITIONER

## 2024-04-16 PROCEDURE — 84436 ASSAY OF TOTAL THYROXINE: CPT | Performed by: NURSE PRACTITIONER

## 2024-04-16 PROCEDURE — 25010000002 LEUPROLIDE ACETATE (3 MONTH) PER 3.75 MG: Performed by: INTERNAL MEDICINE

## 2024-04-16 PROCEDURE — 36415 COLL VENOUS BLD VENIPUNCTURE: CPT

## 2024-04-16 RX ORDER — METHOCARBAMOL 750 MG/1
TABLET, FILM COATED ORAL
COMMUNITY
Start: 2024-01-18

## 2024-04-16 RX ORDER — ATOMOXETINE 40 MG/1
1 CAPSULE ORAL DAILY
COMMUNITY
Start: 2024-03-28

## 2024-04-16 RX ADMIN — LEUPROLIDE ACETATE 11.25 MG: KIT at 15:01

## 2024-04-17 DIAGNOSIS — R53.83 FATIGUE, UNSPECIFIED TYPE: Primary | ICD-10-CM

## 2024-04-17 DIAGNOSIS — C50.212 CARCINOMA OF UPPER-INNER QUADRANT OF LEFT BREAST IN FEMALE, ESTROGEN RECEPTOR POSITIVE: ICD-10-CM

## 2024-04-17 DIAGNOSIS — Z17.0 CARCINOMA OF UPPER-INNER QUADRANT OF LEFT BREAST IN FEMALE, ESTROGEN RECEPTOR POSITIVE: ICD-10-CM

## 2024-04-23 LAB — METHYLMALONATE SERPL-SCNC: 135 NMOL/L (ref 0–378)

## 2024-06-03 RX ORDER — ANASTROZOLE 1 MG/1
1 TABLET ORAL DAILY
Qty: 30 TABLET | Refills: 0 | Status: SHIPPED | OUTPATIENT
Start: 2024-06-03

## 2024-07-09 NOTE — PROGRESS NOTES
HEMATOLOGY ONCOLOGY OUTPATIENT FOLLOW-UP       Patient name: Stacy Grewal  : 1980  MRN: [id number]  Primary Care Physician: Brenda Sheikh MD  Referring Physician: No ref. provider found  Reason For Consult: Breast cancer    History of Present Illness:  Patient is a 44 y.o.     2023: Ms. grewal came to this office seeking attention for the first time on this date.She had been under my care in [place], [place].  In 2020 she had noted an area of discoloration above the nipple on the left breast.  Immediately underneath she had a hard nodule.  Over time it did not seem to increase in size and was not associated to any other symptoms.  For this reason eventually she underwent a screening mammogram that reported a 3 cm high density, spiculated tumor at the 11 to 12 o'clock position.  It was associated to pleomorphic microcalcifications and prominent architectural distortion.  An ultrasound confirmed a 3.2 x 2.3 x 1 point centimeter tumor.  A stereotactic biopsy took place on 2020.  He reported a mammary carcinoma of no special type.  It was great to 2 3.  Estrogen receptors were moderately positive in 60% of the cells.  The progesterone receptors were as well strongly positive in 10% of the cells.  HER2 was negative Ki-67 was positive in 20% of the cells.  She had been seen by Dr. Sunni Barber who requested neoadjuvant chemotherapy both because of the location but also because of the size of the tumor.  She received a port.  She started treatment with AC on a dose-dense fashion, with growth factor support.  She attempted scalp cooling that she did not tolerate and she ended up with universal alopecia.  After 4 cycles of this combination she was started on paclitaxel which she received on an every 3-week schedule.  There was evidence of response with reduction in the size of the tumor.  She was taken to the operating room.  On 2020 she underwent an  ultrasound-guided lumpectomy with sentinel lymph node biopsy.  Residual invasive mammary carcinoma of no special type with apocrine features was present.  It measured 3 cm and was high-grade (3, 3, 2).  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  Of the met 2 sentinel lymph nodes submitted, neither had invasive disease.  All margins were negative.  The disease was staged as [id number].  She started ovarian suppression with leuprolide and hormonal manipulation with anastrozole.  At the time of the last visit with me she was tolerating the treatment reasonably well.    She then was seen by Dr. Prakash Escobedo and who suggested that the sertraline that she had been taking was not compatible with her treatments and switch to a different antidepressant.  This resulted in anxiety that was treated with temazepam.  A few days later she went through a difficult divorce and on 1 occasion took several tablets of temazepam.  This resulted in an admission to the hospital where she spent at least 3 days unresponsive.  Eventually she recovered.  She was placed back on the sertraline by her psychiatrist.    On the day of this visit she had several complaints.  She had become forgetful and had to write everything down.  She had a persistent headache and frequent smell of cigarette smoke even when there was none present that had been going on for several months without worsening.  She also complained of back pain with radiation to the left lower extremity along the posterior aspect of the thigh.  She had become nearly incontinent of both stool and urine but she had no melena or hematochezia.  She had continued to gain weight in spite of her efforts but he had not been able to exercise or change her diet pattern.  On exam there were no palpable lymph nodes.  The lungs were clear.  The heart regular.  Abdomen rounded, protuberant and soft.  Liver and spleen not enlarged and a tumor could not be identified.  There was no edema.  A  decision was made to continue with the same antineoplastic therapy.  She was also instructed to continue with what ever antidepressant her psychiatrist was prescribing as there was no formal contraindication of sertraline generally with any of the other SSRIs with the treatment she was receiving.  She was referred to gynecology for a Pap smear (she had not had one in more than 5 years) and for pelvic exam.  She was referred to social work to see if she could find health insurance through the health insurance exchange.    8/7/2023: Feeling somewhat better. The physical therapy has helped some. She has been eating well and without unintended weight loss. No fevers. Active and working full time. Tolerating the medication well. On exam no changes. The laboratory exams were reviewed and discussed with her. To continue with the same treatment. She is to see me in 2 months.     10/30/2023: Has not been feeling particularly well.  Has had a persistent headache that is not unusual for her but has been long-lasting.  She also has noted a small nodule on her scalp to increase in size.  She is eating well and her weight continues to go up.  She has had no chest pains and no more dyspnea than usual.  No abdominal pain.  She has maintained regular bowel activity and she has no dysuria.  No skin rash.  On exam no changes.  She does have a small nodule on the skin of the scalp on the right parietal region, a few centimeters posterior to the frontal region.  There are no associated skin changes and it is hard to palpation.  It does not appear to be a malignant deposit but I am not entirely sure.  The lungs are clear.  The heart is regular.  The abdomen is protuberant, soft and without hepatomegaly or splenomegaly.  There is no edema.  There is no skin rash.  I offered her referral to surgery for sampling of the subcutaneous nodule on the scalp though I am not convinced that it is malignant.  She is leery of that and anxious with  procedures.  We will postpone and see again the next time.  Her liver enzymes have persistently been elevated and I have asked her to allow me to do a CT of the abdomen to evaluate her liver.  I suspect steatohepatitis.  Discussed with her.    12/14/2023: Feeling about the same.  Frequently tired and frequently with headache but able to perform all her duties.  She sleeps poorly but not any different than usual.  She continues to eat reasonably well and has had no nausea, vomiting or unintended weight loss.  She has been free of pain.  On exam she is alert, conversant and in no distress.  She is well-oriented.  The lungs are clear.  The heart is regular.  The abdomen is protuberant but soft and without hepatomegaly or splenomegaly.  The laboratory exams were reviewed and discussed with her.  I reviewed independently the images of her scans and discussed with her.  No suggestion of metastatic disease.  She continues to tolerate the treatment with ovarian suppression and an aromatase inhibitor well.  Her last estradiol, in June of this year was suppressed.  Will continue to monitor.     7/12/2024: Feeling reasonably well and continues to work full time. Eating well and without unintended weight loss. No chest pain or cough. No abdominal pain. Complains of feelings of cold feet. No claudication. On exam alert and conversant. No jaundice. No oral lesions and respirations not labored. Lungs clear and heart regular. Abdomen soft. No edema. Adequate pedal and posterior tibialis pulses. Laboratory exams reviewed. Has subclinical hypothyroidism. Discussed with her.     Past Medical History:   Diagnosis Date    Anemia     Anxiety     Balance problem     Breast cancer     Left    Depression     Drug therapy     History of chemotherapy     7/22/2020. SEES DR ATKINS IN [place]    History of migraine     History of neuropathy     IN FEET    History of UTI      Past Surgical History:   Procedure Laterality Date    APPENDECTOMY       BREAST BIOPSY Left     MARCH 2020. TREATMENT WITH CHEMO.COMPLETED IN JULY 2020    BREAST LUMPECTOMY WITH SENTINEL NODE BIOPSY Left 8/26/2020    Procedure: Left breast ultrasound-guided lumpectomy, left axillary sentinel lymph node biopsy;  Surgeon: Sunni Barber MD;  Location: [facility];  Service: General;  Laterality: Left;    BREAST SURGERY Bilateral 8/26/2020    Procedure: LEFT BREAST ONCOPLASTIC CLOSURE RIGHT BREAST REDUCTION FOR SYMMETERY;  Surgeon: Angela Davidson MD;  Location: [facility];  Service: Plastics;  Laterality: Bilateral;    VENOUS ACCESS DEVICE (PORT) INSERTION Right 4/7/2020    Procedure: RIGHT port placement.;  Surgeon: Sunni Barber MD;  Location: [facility];  Service: General;  Laterality: Right;       Current Outpatient Medications:     anastrozole (ARIMIDEX) 1 MG tablet, Take 1 tablet by mouth Daily., Disp: 30 tablet, Rfl: 0    atomoxetine (STRATTERA) 40 MG capsule, Take 1 capsule by mouth Daily., Disp: , Rfl:     sertraline (ZOLOFT) 100 MG tablet, Take 1 tablet by mouth 2 (Two) Times a Day., Disp: , Rfl:     methocarbamol (ROBAXIN) 750 MG tablet, TAKE 1 TABLET BY MOUTH EVERY 8 HOURS AS NEEDED FOR MUSCLE RELAXATION (Patient not taking: Reported on 7/12/2024), Disp: , Rfl:   No current facility-administered medications for this visit.    No Known Allergies    Family History   Problem Relation Age of Onset    Breast cancer Paternal Aunt         GREAT PATERNAL AUNT     Breast cancer Cousin 35        PATERNAL COUSIN     Malig Hyperthermia Neg Hx      Cancer-related family history includes Breast cancer in her paternal aunt; Breast cancer (age of onset: 35) in her cousin.    Social History     Tobacco Use    Smoking status: Never    Smokeless tobacco: Never   Vaping Use    Vaping status: Never Used   Substance Use Topics    Alcohol use: Yes     Comment: Occasional    Drug use: Never     Social History     Social History Narrative    Not on file     ROS:  "  Review of Systems   Constitutional:  Positive for activity change and fatigue. Negative for appetite change, chills, diaphoresis, fever and unexpected weight change.   HENT:  Negative for congestion, dental problem, drooling, ear discharge, ear pain, facial swelling, hearing loss, mouth sores, nosebleeds, postnasal drip, rhinorrhea, sinus pressure, sinus pain, sneezing, sore throat, tinnitus, trouble swallowing and voice change.    Eyes:  Negative for photophobia, pain, discharge, redness, itching and visual disturbance.   Respiratory:  Negative for apnea, cough, choking, chest tightness, shortness of breath, wheezing and stridor.    Cardiovascular:  Negative for chest pain, palpitations and leg swelling.   Gastrointestinal:  Negative for abdominal distention, abdominal pain, anal bleeding, blood in stool, constipation, diarrhea, nausea, rectal pain and vomiting.   Endocrine: Negative for cold intolerance, heat intolerance, polydipsia and polyuria.   Genitourinary:  Negative for decreased urine volume, difficulty urinating, dysuria, flank pain, frequency, genital sores, hematuria and urgency.   Musculoskeletal:  Positive for back pain. Negative for arthralgias, gait problem, joint swelling, myalgias, neck pain and neck stiffness.   Skin:  Negative for color change, pallor and rash.   Neurological:  Positive for headaches. Negative for dizziness, tremors, seizures, syncope, facial asymmetry, speech difficulty, weakness, light-headedness and numbness.   Hematological:  Negative for adenopathy. Does not bruise/bleed easily.   Psychiatric/Behavioral:  Negative for agitation, behavioral problems, confusion, decreased concentration, hallucinations, self-injury, sleep disturbance and suicidal ideas. The patient is not nervous/anxious.      Objective:    Vital Signs:  Vitals:    07/12/24 1504   BP: 126/87   Pulse: 85   SpO2: 97%   Weight: 90.7 kg (200 lb)   Height: 160 cm (62.99\")   PainSc: 0-No pain     Body mass index " is 35.44 kg/m².    ECOG  (0) Fully active, able to carry on all predisease performance without restriction    Physical Exam:   Physical Exam  Constitutional:       General: She is not in acute distress.     Appearance: She is ill-appearing. She is not toxic-appearing or diaphoretic.      Comments: Well-built, well oriented and in no distress.  Conversant and in good spirits.  BMI greater than 37 kg/m².   HENT:      Head: Normocephalic and atraumatic.      Right Ear: External ear normal.      Left Ear: External ear normal.      Nose: Nose normal.      Mouth/Throat:      Mouth: Mucous membranes are moist.      Pharynx: Oropharynx is clear. No oropharyngeal exudate or posterior oropharyngeal erythema.   Eyes:      General: No scleral icterus.        Right eye: No discharge.         Left eye: No discharge.      Conjunctiva/sclera: Conjunctivae normal.      Pupils: Pupils are equal, round, and reactive to light.   Cardiovascular:      Rate and Rhythm: Normal rate and regular rhythm.      Pulses: Normal pulses.      Heart sounds: No murmur heard.     No friction rub. No gallop.   Pulmonary:      Effort: No respiratory distress.      Breath sounds: No stridor. No wheezing, rhonchi or rales.   Abdominal:      General: Bowel sounds are normal. There is no distension.      Palpations: Abdomen is soft. There is no mass.      Tenderness: There is no abdominal tenderness. There is no right CVA tenderness, left CVA tenderness, guarding or rebound.      Hernia: No hernia is present.      Comments: Rounded, protuberant, soft and nontender.  Liver and spleen do not appear enlarged.  No tumors are present.   Musculoskeletal:         General: No swelling, tenderness, deformity or signs of injury.      Cervical back: No rigidity.      Right lower leg: No edema.      Left lower leg: No edema.   Lymphadenopathy:      Cervical: No cervical adenopathy.   Skin:     Coloration: Skin is not jaundiced.      Findings: No bruising, lesion or  rash.      Comments: The subcutaneous nodule in the scalp is palpable.  Soft and mobile.   Neurological:      General: No focal deficit present.      Mental Status: She is alert and oriented to person, place, and time.      Cranial Nerves: No cranial nerve deficit.      Motor: No weakness.      Gait: Gait normal.   Psychiatric:         Mood and Affect: Mood normal.         Behavior: Behavior normal.         Thought Content: Thought content normal.         Judgment: Judgment normal.     MATT Chew MD performed the physical exam on 7/12/2024 as documented above.     Lab Results   Component Value Date    GLUCOSE 100 (H) 04/16/2024    BUN 6 04/16/2024    CREATININE 0.78 04/16/2024    EGFRIFNONA 84 08/24/2020    BCR 7.7 04/16/2024    K 4.0 04/16/2024    CO2 28.0 04/16/2024    CALCIUM 9.8 04/16/2024    ALBUMIN 4.6 04/16/2024    LABIL2 1.3 07/22/2020    AST 25 04/16/2024    ALT 23 04/16/2024     Assessment & Plan     1.  ypT2 N0 M0 estrogen receptor and progesterone receptor positive, HER2 negative moderately differentiated invasive carcinoma of the left breast.  Underwent neoadjuvant chemotherapy with AC, followed by paclitaxel.  Had a partial response to treatment and this was followed by lumpectomy and sentinel lymph node biopsy, radiation therapy and ovarian suppression and hormonal manipulation with anastrozole.  Free of any clear suggestion of recurrent disease.  The small subcutaneous nodule on the scalp will be followed and sampled if necessary.  Will refer to dermatology for further investigation due to the patient feeling that the subcutaneous nodule on the scalp is increasing. CT scans no suggestion of progressive disease.   2.  Fatigue/memory difficulties: Continue to improved. Complains of somnolence during the day. Requested sleep study   3.  Back pain with sciatica pattern that has not increased over several months.  Unchanged.  Physical therapy seemed to provide relief.  4.  Fecal and urinary tract  near incontinence: Follow up with gynecology.   5.  Continue same treatment. See me in 3 months.     Good Chew MD on 7/13/2024 at 16:25.

## 2024-07-12 ENCOUNTER — OFFICE VISIT (OUTPATIENT)
Dept: ONCOLOGY | Facility: CLINIC | Age: 44
End: 2024-07-12
Payer: COMMERCIAL

## 2024-07-12 ENCOUNTER — LAB (OUTPATIENT)
Dept: LAB | Facility: HOSPITAL | Age: 44
End: 2024-07-12
Payer: COMMERCIAL

## 2024-07-12 ENCOUNTER — HOSPITAL ENCOUNTER (OUTPATIENT)
Dept: ONCOLOGY | Facility: HOSPITAL | Age: 44
Discharge: HOME | End: 2024-07-12
Payer: COMMERCIAL

## 2024-07-12 VITALS
HEART RATE: 85 BPM | SYSTOLIC BLOOD PRESSURE: 126 MMHG | DIASTOLIC BLOOD PRESSURE: 87 MMHG | OXYGEN SATURATION: 97 % | WEIGHT: 200 LBS | HEIGHT: 63 IN | BODY MASS INDEX: 35.44 KG/M2

## 2024-07-12 DIAGNOSIS — Z01.89 NEED FOR ASSESSMENT FOR SLEEP APNEA: ICD-10-CM

## 2024-07-12 DIAGNOSIS — Z17.0 CARCINOMA OF UPPER-INNER QUADRANT OF LEFT BREAST IN FEMALE, ESTROGEN RECEPTOR POSITIVE: Primary | ICD-10-CM

## 2024-07-12 DIAGNOSIS — Z17.0 CARCINOMA OF UPPER-INNER QUADRANT OF LEFT BREAST IN FEMALE, ESTROGEN RECEPTOR POSITIVE: ICD-10-CM

## 2024-07-12 DIAGNOSIS — C50.212 CARCINOMA OF UPPER-INNER QUADRANT OF LEFT BREAST IN FEMALE, ESTROGEN RECEPTOR POSITIVE: Primary | ICD-10-CM

## 2024-07-12 DIAGNOSIS — C50.212 CARCINOMA OF UPPER-INNER QUADRANT OF LEFT BREAST IN FEMALE, ESTROGEN RECEPTOR POSITIVE: ICD-10-CM

## 2024-07-12 DIAGNOSIS — N95.9 PREMENOPAUSAL PATIENT: Primary | ICD-10-CM

## 2024-07-12 DIAGNOSIS — R53.83 FATIGUE, UNSPECIFIED TYPE: ICD-10-CM

## 2024-07-12 LAB
ALBUMIN SERPL-MCNC: 4.9 G/DL (ref 3.5–5.2)
ALBUMIN/GLOB SERPL: 1.6 G/DL
ALP SERPL-CCNC: 195 U/L (ref 39–117)
ALT SERPL W P-5'-P-CCNC: 51 U/L (ref 1–33)
ANION GAP SERPL CALCULATED.3IONS-SCNC: 10.8 MMOL/L (ref 5–15)
AST SERPL-CCNC: 47 U/L (ref 1–32)
BASOPHILS # BLD AUTO: 0.02 10*3/MM3 (ref 0–0.2)
BASOPHILS NFR BLD AUTO: 0.2 % (ref 0–1.5)
BILIRUB SERPL-MCNC: 0.4 MG/DL (ref 0–1.2)
BUN SERPL-MCNC: 7 MG/DL (ref 6–20)
BUN/CREAT SERPL: 8.8 (ref 7–25)
CALCIUM SPEC-SCNC: 10.1 MG/DL (ref 8.6–10.5)
CHLORIDE SERPL-SCNC: 100 MMOL/L (ref 98–107)
CO2 SERPL-SCNC: 29.2 MMOL/L (ref 22–29)
CREAT SERPL-MCNC: 0.8 MG/DL (ref 0.57–1)
DEPRECATED RDW RBC AUTO: 40.7 FL (ref 37–54)
EGFRCR SERPLBLD CKD-EPI 2021: 94.5 ML/MIN/1.73
EOSINOPHIL # BLD AUTO: 0.3 10*3/MM3 (ref 0–0.4)
EOSINOPHIL NFR BLD AUTO: 3 % (ref 0.3–6.2)
ERYTHROCYTE [DISTWIDTH] IN BLOOD BY AUTOMATED COUNT: 13.2 % (ref 12.3–15.4)
GLOBULIN UR ELPH-MCNC: 3 GM/DL
GLUCOSE SERPL-MCNC: 96 MG/DL (ref 65–99)
HCT VFR BLD AUTO: 41.4 % (ref 34–46.6)
HGB BLD-MCNC: 13.9 G/DL (ref 12–15.9)
HOLD SPECIMEN: NORMAL
HOLD SPECIMEN: NORMAL
LYMPHOCYTES # BLD AUTO: 2.54 10*3/MM3 (ref 0.7–3.1)
LYMPHOCYTES NFR BLD AUTO: 25.6 % (ref 19.6–45.3)
MCH RBC QN AUTO: 28.7 PG (ref 26.6–33)
MCHC RBC AUTO-ENTMCNC: 33.6 G/DL (ref 31.5–35.7)
MCV RBC AUTO: 85.5 FL (ref 79–97)
MONOCYTES # BLD AUTO: 0.57 10*3/MM3 (ref 0.1–0.9)
MONOCYTES NFR BLD AUTO: 5.8 % (ref 5–12)
NEUTROPHILS NFR BLD AUTO: 6.48 10*3/MM3 (ref 1.7–7)
NEUTROPHILS NFR BLD AUTO: 65.4 % (ref 42.7–76)
PLATELET # BLD AUTO: 233 10*3/MM3 (ref 140–450)
PMV BLD AUTO: 8.8 FL (ref 6–12)
POTASSIUM SERPL-SCNC: 3.8 MMOL/L (ref 3.5–5.2)
PROT SERPL-MCNC: 7.9 G/DL (ref 6–8.5)
RBC # BLD AUTO: 4.84 10*6/MM3 (ref 3.77–5.28)
SODIUM SERPL-SCNC: 140 MMOL/L (ref 136–145)
WBC NRBC COR # BLD AUTO: 9.91 10*3/MM3 (ref 3.4–10.8)

## 2024-07-12 PROCEDURE — 85025 COMPLETE CBC W/AUTO DIFF WBC: CPT

## 2024-07-12 PROCEDURE — 80053 COMPREHEN METABOLIC PANEL: CPT | Performed by: INTERNAL MEDICINE

## 2024-07-12 PROCEDURE — 25010000002 LEUPROLIDE ACETATE (3 MONTH) PER 3.75 MG: Performed by: INTERNAL MEDICINE

## 2024-07-12 PROCEDURE — 84479 ASSAY OF THYROID (T3 OR T4): CPT | Performed by: INTERNAL MEDICINE

## 2024-07-12 PROCEDURE — 84443 ASSAY THYROID STIM HORMONE: CPT | Performed by: INTERNAL MEDICINE

## 2024-07-12 PROCEDURE — 96402 CHEMO HORMON ANTINEOPL SQ/IM: CPT

## 2024-07-12 PROCEDURE — 84436 ASSAY OF TOTAL THYROXINE: CPT | Performed by: INTERNAL MEDICINE

## 2024-07-12 PROCEDURE — 36415 COLL VENOUS BLD VENIPUNCTURE: CPT

## 2024-07-12 RX ADMIN — LEUPROLIDE ACETATE 11.25 MG: KIT at 16:08

## 2024-07-13 LAB
T-UPTAKE NFR SERPL: 1.15 TBI (ref 0.8–1.3)
T4 SERPL-MCNC: 6.52 MCG/DL (ref 4.5–11.7)
TSH SERPL DL<=0.05 MIU/L-ACNC: 5.8 UIU/ML (ref 0.27–4.2)

## 2024-08-16 RX ORDER — ANASTROZOLE 1 MG/1
1 TABLET ORAL DAILY
Qty: 90 TABLET | Refills: 2 | Status: SHIPPED | OUTPATIENT
Start: 2024-08-16

## 2024-11-19 NOTE — PROGRESS NOTES
HEMATOLOGY ONCOLOGY OUTPATIENT FOLLOW-UP       Patient name: Stacy Grewal  : 1980  MRN: [id number]  Primary Care Physician: Brenda Sheikh MD  Referring Physician: Brenad Sheikh MD  Reason For Consult: Breast cancer    History of Present Illness:      2023: Ms. grewal came to this office seeking attention for the first time on this date.She had been under my care in [place], [place].  In 2020 she had noted an area of discoloration above the nipple on the left breast.  Immediately underneath she had a hard nodule.  Over time it did not seem to increase in size and was not associated to any other symptoms.  For this reason eventually she underwent a screening mammogram that reported a 3 cm high density, spiculated tumor at the 11 to 12 o'clock position.  It was associated to pleomorphic microcalcifications and prominent architectural distortion.  An ultrasound confirmed a 3.2 x 2.3 x 1 point centimeter tumor.  A stereotactic biopsy took place on 2020.  He reported a mammary carcinoma of no special type.  It was great to 2 3.  Estrogen receptors were moderately positive in 60% of the cells.  The progesterone receptors were as well strongly positive in 10% of the cells.  HER2 was negative Ki-67 was positive in 20% of the cells.  She had been seen by Dr. Sunni Barber who requested neoadjuvant chemotherapy both because of the location but also because of the size of the tumor.  She received a port.  She started treatment with AC on a dose-dense fashion, with growth factor support.  She attempted scalp cooling that she did not tolerate and she ended up with universal alopecia.  After 4 cycles of this combination she was started on paclitaxel which she received on an every 3-week schedule.  There was evidence of response with reduction in the size of the tumor.  She was taken to the operating room.  On 2020 she underwent an ultrasound-guided  lumpectomy with sentinel lymph node biopsy.  Residual invasive mammary carcinoma of no special type with apocrine features was present.  It measured 3 cm and was high-grade (3, 3, 2).  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  Of the met 2 sentinel lymph nodes submitted, neither had invasive disease.  All margins were negative.  The disease was staged as [id number].  She started ovarian suppression with leuprolide and hormonal manipulation with anastrozole.  At the time of the last visit with me she was tolerating the treatment reasonably well.    She then was seen by Dr. Prakash Escobedo and who suggested that the sertraline that she had been taking was not compatible with her treatments and switch to a different antidepressant.  This resulted in anxiety that was treated with temazepam.  A few days later she went through a difficult divorce and on 1 occasion took several tablets of temazepam.  This resulted in an admission to the hospital where she spent at least 3 days unresponsive.  Eventually she recovered.  She was placed back on the sertraline by her psychiatrist.    On the day of this visit she had several complaints.  She had become forgetful and had to write everything down.  She had a persistent headache and frequent smell of cigarette smoke even when there was none present that had been going on for several months without worsening.  She also complained of back pain with radiation to the left lower extremity along the posterior aspect of the thigh.  She had become nearly incontinent of both stool and urine but she had no melena or hematochezia.  She had continued to gain weight in spite of her efforts but he had not been able to exercise or change her diet pattern.  On exam there were no palpable lymph nodes.  The lungs were clear.  The heart regular.  Abdomen rounded, protuberant and soft.  Liver and spleen not enlarged and a tumor could not be identified.  There was no edema.  A decision was made to  continue with the same antineoplastic therapy.  She was also instructed to continue with what ever antidepressant her psychiatrist was prescribing as there was no formal contraindication of sertraline generally with any of the other SSRIs with the treatment she was receiving.  She was referred to gynecology for a Pap smear (she had not had one in more than 5 years) and for pelvic exam.  She was referred to social work to see if she could find health insurance through the health insurance exchange.    8/7/2023: Feeling somewhat better. The physical therapy has helped some. She has been eating well and without unintended weight loss. No fevers. Active and working full time. Tolerating the medication well. On exam no changes. The laboratory exams were reviewed and discussed with her. To continue with the same treatment. She is to see me in 2 months.     10/30/2023: Has not been feeling particularly well.  Has had a persistent headache that is not unusual for her but has been long-lasting.  She also has noted a small nodule on her scalp to increase in size.  She is eating well and her weight continues to go up.  She has had no chest pains and no more dyspnea than usual.  No abdominal pain.  She has maintained regular bowel activity and she has no dysuria.  No skin rash.  On exam no changes.  She does have a small nodule on the skin of the scalp on the right parietal region, a few centimeters posterior to the frontal region.  There are no associated skin changes and it is hard to palpation.  It does not appear to be a malignant deposit but I am not entirely sure.  The lungs are clear.  The heart is regular.  The abdomen is protuberant, soft and without hepatomegaly or splenomegaly.  There is no edema.  There is no skin rash.  I offered her referral to surgery for sampling of the subcutaneous nodule on the scalp though I am not convinced that it is malignant.  She is leery of that and anxious with procedures.  We will  postpone and see again the next time.  Her liver enzymes have persistently been elevated and I have asked her to allow me to do a CT of the abdomen to evaluate her liver.  I suspect steatohepatitis.  Discussed with her.    12/14/2023: Feeling about the same.  Frequently tired and frequently with headache but able to perform all her duties.  She sleeps poorly but not any different than usual.  She continues to eat reasonably well and has had no nausea, vomiting or unintended weight loss.  She has been free of pain.  On exam she is alert, conversant and in no distress.  She is well-oriented.  The lungs are clear.  The heart is regular.  The abdomen is protuberant but soft and without hepatomegaly or splenomegaly.  The laboratory exams were reviewed and discussed with her.  I reviewed independently the images of her scans and discussed with her.  No suggestion of metastatic disease.  She continues to tolerate the treatment with ovarian suppression and an aromatase inhibitor well.  Her last estradiol, in June of this year was suppressed.  Will continue to monitor.     7/12/2024: Feeling reasonably well and continues to work full time. Eating well and without unintended weight loss. No chest pain or cough. No abdominal pain. Complains of feelings of cold feet. No claudication. On exam alert and conversant. No jaundice. No oral lesions and respirations not labored. Lungs clear and heart regular. Abdomen soft. No edema. Adequate pedal and posterior tibialis pulses. Laboratory exams reviewed. Has subclinical hypothyroidism. Discussed with her.     11/22/2024: Feels reasonably well.  Again complains of pain in the lower back that radiates down and the lower extremity on the left.  This is particularly prominent if she is very active physically.  She works full-time and has no new limitations.  She eats well and has not lost weight.  She has been without increasing dyspnea or cough.  No diarrhea or dysuria.  On exam she is  alert and conversant.  Oriented and in no distress.  No jaundice.  The lungs are clear bilaterally and the heart regular.  There is no palpable adenopathy in the supraclavicular or axillary spaces.  Abdomen is soft.  No palpable tumors.  No edema.  Laboratory exams reviewed.  Discussed with her.  No suggestion of recurrent disease however, her liver enzymes at the time of the last visit were elevated.  I will check them today.  I have also requested a bone scan to make sure that her complaints are not related to a significant problem though on the last scan of the abdomen and pelvis no significant abnormalities were identified.    Past Medical History:   Diagnosis Date    Anemia     Anxiety     Balance problem     Breast cancer     Left    Depression     Drug therapy     History of chemotherapy     7/22/2020. SEES DR ATKINS [place]    History of migraine     History of neuropathy     IN FEET    History of UTI      Past Surgical History:   Procedure Laterality Date    APPENDECTOMY      BREAST BIOPSY Left     MARCH 2020. TREATMENT WITH CHEMO.COMPLETED IN JULY 2020    BREAST LUMPECTOMY WITH SENTINEL NODE BIOPSY Left 8/26/2020    Procedure: Left breast ultrasound-guided lumpectomy, left axillary sentinel lymph node biopsy;  Surgeon: Sunni Barber MD;  Location: [facility];  Service: General;  Laterality: Left;    BREAST SURGERY Bilateral 8/26/2020    Procedure: LEFT BREAST ONCOPLASTIC CLOSURE RIGHT BREAST REDUCTION FOR SYMMETERY;  Surgeon: Angela Dvaidson MD;  Location: [facility] OR;  Service: Plastics;  Laterality: Bilateral;    VENOUS ACCESS DEVICE (PORT) INSERTION Right 4/7/2020    Procedure: RIGHT port placement.;  Surgeon: Sunni Barber MD;  Location: [facility];  Service: General;  Laterality: Right;       Current Outpatient Medications:     anastrozole (ARIMIDEX) 1 MG tablet, Take 1 tablet by mouth once daily, Disp: 90 tablet, Rfl: 2    atomoxetine (STRATTERA) 40 MG capsule,  Take 1 capsule by mouth Daily., Disp: , Rfl:     sertraline (ZOLOFT) 100 MG tablet, Take 1 tablet by mouth 2 (Two) Times a Day., Disp: , Rfl:     methocarbamol (ROBAXIN) 750 MG tablet, TAKE 1 TABLET BY MOUTH EVERY 8 HOURS AS NEEDED FOR MUSCLE RELAXATION (Patient not taking: Reported on 11/22/2024), Disp: , Rfl:     No Known Allergies    Family History   Problem Relation Age of Onset    Breast cancer Paternal Aunt         GREAT PATERNAL AUNT     Breast cancer Cousin 35        PATERNAL COUSIN     Malig Hyperthermia Neg Hx      Cancer-related family history includes Breast cancer in her paternal aunt; Breast cancer (age of onset: 35) in her cousin.    Social History     Tobacco Use    Smoking status: Never    Smokeless tobacco: Never   Vaping Use    Vaping status: Never Used   Substance Use Topics    Alcohol use: Yes     Comment: Occasional    Drug use: Never     Social History     Social History Narrative    Not on file     ROS:   Review of Systems   Constitutional:  Positive for activity change and fatigue. Negative for appetite change, chills, diaphoresis, fever and unexpected weight change.   HENT:  Negative for congestion, dental problem, drooling, ear discharge, ear pain, facial swelling, hearing loss, mouth sores, nosebleeds, postnasal drip, rhinorrhea, sinus pressure, sinus pain, sneezing, sore throat, tinnitus, trouble swallowing and voice change.    Eyes:  Negative for photophobia, pain, discharge, redness, itching and visual disturbance.   Respiratory:  Negative for apnea, cough, choking, chest tightness, shortness of breath, wheezing and stridor.    Cardiovascular:  Negative for chest pain, palpitations and leg swelling.   Gastrointestinal:  Negative for abdominal distention, abdominal pain, anal bleeding, blood in stool, constipation, diarrhea, nausea, rectal pain and vomiting.   Endocrine: Negative for cold intolerance, heat intolerance, polydipsia and polyuria.   Genitourinary:  Negative for decreased  "urine volume, difficulty urinating, dysuria, flank pain, frequency, genital sores, hematuria and urgency.   Musculoskeletal:  Positive for back pain. Negative for arthralgias, gait problem, joint swelling, myalgias, neck pain and neck stiffness.   Skin:  Negative for color change, pallor and rash.   Neurological:  Positive for headaches. Negative for dizziness, tremors, seizures, syncope, facial asymmetry, speech difficulty, weakness, light-headedness and numbness.   Hematological:  Negative for adenopathy. Does not bruise/bleed easily.   Psychiatric/Behavioral:  Negative for agitation, behavioral problems, confusion, decreased concentration, hallucinations, self-injury, sleep disturbance and suicidal ideas. The patient is not nervous/anxious.      Objective:    Vital Signs:  Vitals:    11/22/24 1501   BP: 128/88   Pulse: 94   Resp: 17   Temp: 98.4 °F (36.9 °C)   SpO2: 98%   Weight: 88.9 kg (196 lb)   Height: 160 cm (63\")   PainSc:   6   PainLoc: Hip     Body mass index is 34.72 kg/m².    ECOG  (0) Fully active, able to carry on all predisease performance without restriction    Physical Exam:   Physical Exam  Constitutional:       General: She is not in acute distress.     Appearance: She is ill-appearing. She is not toxic-appearing or diaphoretic.      Comments: Well-built, well oriented and in no distress.  Conversant and in good spirits.  BMI greater than 37 kg/m².   HENT:      Head: Normocephalic and atraumatic.      Right Ear: External ear normal.      Left Ear: External ear normal.      Nose: Nose normal.      Mouth/Throat:      Mouth: Mucous membranes are moist.      Pharynx: Oropharynx is clear. No oropharyngeal exudate or posterior oropharyngeal erythema.   Eyes:      General: No scleral icterus.        Right eye: No discharge.         Left eye: No discharge.      Conjunctiva/sclera: Conjunctivae normal.      Pupils: Pupils are equal, round, and reactive to light.   Cardiovascular:      Rate and Rhythm: " Normal rate and regular rhythm.      Pulses: Normal pulses.      Heart sounds: No murmur heard.     No friction rub. No gallop.   Pulmonary:      Effort: No respiratory distress.      Breath sounds: No stridor. No wheezing, rhonchi or rales.   Abdominal:      General: Bowel sounds are normal. There is no distension.      Palpations: Abdomen is soft. There is no mass.      Tenderness: There is no abdominal tenderness. There is no right CVA tenderness, left CVA tenderness, guarding or rebound.      Hernia: No hernia is present.      Comments: Rounded, protuberant, soft and nontender.  Liver and spleen do not appear enlarged.  No tumors are present.   Musculoskeletal:         General: No swelling, tenderness, deformity or signs of injury.      Cervical back: No rigidity.      Right lower leg: No edema.      Left lower leg: No edema.   Lymphadenopathy:      Cervical: No cervical adenopathy.   Skin:     Coloration: Skin is not jaundiced.      Findings: No bruising, lesion or rash.      Comments: The subcutaneous nodule in the scalp is palpable.  Soft and mobile.   Neurological:      General: No focal deficit present.      Mental Status: She is alert and oriented to person, place, and time.      Cranial Nerves: No cranial nerve deficit.      Motor: No weakness.      Gait: Gait normal.   Psychiatric:         Mood and Affect: Mood normal.         Behavior: Behavior normal.         Thought Content: Thought content normal.         Judgment: Judgment normal.     MATT Chew MD performed the physical exam on 11/22/2024 as documented above.    Lab Results   Component Value Date    GLUCOSE 96 07/12/2024    BUN 7 07/12/2024    CREATININE 0.80 07/12/2024    EGFRIFNONA 84 08/24/2020    BCR 8.8 07/12/2024    K 3.8 07/12/2024    CO2 29.2 (H) 07/12/2024    CALCIUM 10.1 07/12/2024    ALBUMIN 4.9 07/12/2024    LABIL2 1.3 07/22/2020    AST 47 (H) 07/12/2024    ALT 51 (H) 07/12/2024     Assessment & Plan     1.  ypT2 N0 M0 estrogen  receptor and progesterone receptor positive, HER2 negative moderately differentiated invasive carcinoma of the left breast.  Underwent neoadjuvant chemotherapy with AC, followed by paclitaxel.  Had a partial response to treatment and this was followed by lumpectomy and sentinel lymph node biopsy, radiation therapy and ovarian suppression and hormonal manipulation with anastrozole.  Remains on ovarian suppression and treatment with anastrozole.  Tolerates well.  Continue same.  2.  Fatigue/memory difficulties: Improved.  Continues to work without new limitations.  3.  Back pain with sciatica pattern that persists.  Additional investigations with a bone scan.  4.  Abnormal liver enzymes: Will recheck today.  5.  Reviewed the laboratory exams.  Discussed the results with her.  6.  She is to see me in approximately 2 months with results.    Good Chew MD on 11/22/2024 at 1551

## 2024-11-22 ENCOUNTER — OFFICE VISIT (OUTPATIENT)
Dept: ONCOLOGY | Facility: CLINIC | Age: 44
End: 2024-11-22
Payer: COMMERCIAL

## 2024-11-22 ENCOUNTER — LAB (OUTPATIENT)
Dept: LAB | Facility: HOSPITAL | Age: 44
End: 2024-11-22
Payer: COMMERCIAL

## 2024-11-22 ENCOUNTER — HOSPITAL ENCOUNTER (OUTPATIENT)
Dept: ONCOLOGY | Facility: HOSPITAL | Age: 44
Discharge: HOME | End: 2024-11-22
Payer: COMMERCIAL

## 2024-11-22 VITALS
TEMPERATURE: 98.4 F | OXYGEN SATURATION: 98 % | RESPIRATION RATE: 17 BRPM | HEART RATE: 94 BPM | SYSTOLIC BLOOD PRESSURE: 128 MMHG | BODY MASS INDEX: 34.73 KG/M2 | WEIGHT: 196 LBS | DIASTOLIC BLOOD PRESSURE: 88 MMHG | HEIGHT: 63 IN

## 2024-11-22 DIAGNOSIS — Z17.0 CARCINOMA OF UPPER-INNER QUADRANT OF LEFT BREAST IN FEMALE, ESTROGEN RECEPTOR POSITIVE: ICD-10-CM

## 2024-11-22 DIAGNOSIS — C50.212 CARCINOMA OF UPPER-INNER QUADRANT OF LEFT BREAST IN FEMALE, ESTROGEN RECEPTOR POSITIVE: Primary | ICD-10-CM

## 2024-11-22 DIAGNOSIS — N95.9 PREMENOPAUSAL PATIENT: Primary | ICD-10-CM

## 2024-11-22 DIAGNOSIS — Z17.0 CARCINOMA OF UPPER-INNER QUADRANT OF LEFT BREAST IN FEMALE, ESTROGEN RECEPTOR POSITIVE: Primary | ICD-10-CM

## 2024-11-22 DIAGNOSIS — C50.212 CARCINOMA OF UPPER-INNER QUADRANT OF LEFT BREAST IN FEMALE, ESTROGEN RECEPTOR POSITIVE: ICD-10-CM

## 2024-11-22 LAB
ALBUMIN SERPL-MCNC: 4.6 G/DL (ref 3.5–5.2)
ALBUMIN/GLOB SERPL: 1.6 G/DL
ALP SERPL-CCNC: 182 U/L (ref 39–117)
ALT SERPL W P-5'-P-CCNC: 46 U/L (ref 1–33)
ANION GAP SERPL CALCULATED.3IONS-SCNC: 10.7 MMOL/L (ref 5–15)
AST SERPL-CCNC: 43 U/L (ref 1–32)
BASOPHILS # BLD AUTO: 0.02 10*3/MM3 (ref 0–0.2)
BASOPHILS NFR BLD AUTO: 0.3 % (ref 0–1.5)
BILIRUB SERPL-MCNC: 0.6 MG/DL (ref 0–1.2)
BUN SERPL-MCNC: 8 MG/DL (ref 6–20)
BUN/CREAT SERPL: 9 (ref 7–25)
CALCIUM SPEC-SCNC: 9.7 MG/DL (ref 8.6–10.5)
CHLORIDE SERPL-SCNC: 100 MMOL/L (ref 98–107)
CO2 SERPL-SCNC: 27.3 MMOL/L (ref 22–29)
CREAT SERPL-MCNC: 0.89 MG/DL (ref 0.57–1)
DEPRECATED RDW RBC AUTO: 39.3 FL (ref 37–54)
EGFRCR SERPLBLD CKD-EPI 2021: 83.1 ML/MIN/1.73
EOSINOPHIL # BLD AUTO: 0.24 10*3/MM3 (ref 0–0.4)
EOSINOPHIL NFR BLD AUTO: 3.2 % (ref 0.3–6.2)
ERYTHROCYTE [DISTWIDTH] IN BLOOD BY AUTOMATED COUNT: 12.8 % (ref 12.3–15.4)
GLOBULIN UR ELPH-MCNC: 2.9 GM/DL
GLUCOSE SERPL-MCNC: 107 MG/DL (ref 65–99)
HCT VFR BLD AUTO: 38.7 % (ref 34–46.6)
HGB BLD-MCNC: 12.9 G/DL (ref 12–15.9)
HOLD SPECIMEN: NORMAL
LYMPHOCYTES # BLD AUTO: 1.99 10*3/MM3 (ref 0.7–3.1)
LYMPHOCYTES NFR BLD AUTO: 26.6 % (ref 19.6–45.3)
MCH RBC QN AUTO: 28.8 PG (ref 26.6–33)
MCHC RBC AUTO-ENTMCNC: 33.3 G/DL (ref 31.5–35.7)
MCV RBC AUTO: 86.4 FL (ref 79–97)
MONOCYTES # BLD AUTO: 0.46 10*3/MM3 (ref 0.1–0.9)
MONOCYTES NFR BLD AUTO: 6.1 % (ref 5–12)
NEUTROPHILS NFR BLD AUTO: 4.77 10*3/MM3 (ref 1.7–7)
NEUTROPHILS NFR BLD AUTO: 63.8 % (ref 42.7–76)
PLATELET # BLD AUTO: 226 10*3/MM3 (ref 140–450)
PMV BLD AUTO: 8.9 FL (ref 6–12)
POTASSIUM SERPL-SCNC: 3.6 MMOL/L (ref 3.5–5.2)
PROT SERPL-MCNC: 7.5 G/DL (ref 6–8.5)
RBC # BLD AUTO: 4.48 10*6/MM3 (ref 3.77–5.28)
SODIUM SERPL-SCNC: 138 MMOL/L (ref 136–145)
WBC NRBC COR # BLD AUTO: 7.48 10*3/MM3 (ref 3.4–10.8)

## 2024-11-22 PROCEDURE — 36415 COLL VENOUS BLD VENIPUNCTURE: CPT

## 2024-11-22 PROCEDURE — 96402 CHEMO HORMON ANTINEOPL SQ/IM: CPT

## 2024-11-22 PROCEDURE — 99213 OFFICE O/P EST LOW 20 MIN: CPT | Performed by: INTERNAL MEDICINE

## 2024-11-22 PROCEDURE — 80053 COMPREHEN METABOLIC PANEL: CPT | Performed by: INTERNAL MEDICINE

## 2024-11-22 PROCEDURE — 85025 COMPLETE CBC W/AUTO DIFF WBC: CPT

## 2024-12-03 ENCOUNTER — TELEPHONE (OUTPATIENT)
Dept: ONCOLOGY | Facility: CLINIC | Age: 44
End: 2024-12-03

## 2024-12-03 DIAGNOSIS — R74.8 ELEVATED LIVER ENZYMES: Primary | ICD-10-CM

## 2024-12-03 DIAGNOSIS — G89.29 CHRONIC LOW BACK PAIN WITH SCIATICA, SCIATICA LATERALITY UNSPECIFIED, UNSPECIFIED BACK PAIN LATERALITY: ICD-10-CM

## 2024-12-03 DIAGNOSIS — M54.40 CHRONIC LOW BACK PAIN WITH SCIATICA, SCIATICA LATERALITY UNSPECIFIED, UNSPECIFIED BACK PAIN LATERALITY: ICD-10-CM

## 2024-12-03 NOTE — TELEPHONE ENCOUNTER
Caller: Stacy Grewal    Relationship: Self    Best call back number: [phone]     What is the best time to reach you: ASAP    Who are you requesting to speak with (clinical staff, provider,  specific staff member): CLINICAL      What was the call regarding: PT SAYS DR CHÁVEZ WANTS HER TO HAVE A BONE SCAN AND MAYBE ANOTHER TEST, AND SHE HAS MET HER DEDUCTIBLE FOR THE YEAR AND WANTS TO GET THOSE SCHEDULED AS SOON AS POSSIBLE.  PLEASE CALL PT TO ADVISE WHAT TESTING IS NEEDED AND IF ORDERS ARE BEING PLACED.

## 2024-12-03 NOTE — TELEPHONE ENCOUNTER
After speaking with Dr. Chew, I attempted to contact the patient. Unable to speak with the patient due to no answer. Voicemail left requesting a callback. Callback number stated on voicemail.

## 2024-12-04 NOTE — TELEPHONE ENCOUNTER
Hub staff attempted to follow warm transfer process and was unsuccessful     Caller: Stacy Grewal    Relationship to patient: Self    Best call back number: [phone]     Patient is needing: PT RETURNING MISSED CALL FROM [facility]

## 2024-12-05 NOTE — TELEPHONE ENCOUNTER
Hub staff attempted to follow warm transfer process and was unsuccessful     Caller: Stacy Grewal    Relationship to patient: Self    Best call back number: [phone]    Patient is needing: PT CALLING BACK TO SPEAK WITH BECKY. PT ASKED ABOUT ORDERS AND WAS ADVISED THAT ORDERS WERE PUT IN FOR A BONE SCAN AND ULTRASOUND. PT WAS GIVEN THE NUMBER TO [facility] SCHEDULING TO SEE IF SHE COULD GET THOSE SCHEDULED BEFORE THE BEGINNING OF THE YEAR.     PLEASE CALL THE PT AND ADVISE IF THERE IS MORE INFORMATION THAT SHE IS NEEDING OR NEEDS TO DO ANYTHING DIFFERENTLY.

## 2024-12-18 ENCOUNTER — HOSPITAL ENCOUNTER (OUTPATIENT)
Dept: NUCLEAR MEDICINE | Facility: HOSPITAL | Age: 44
Discharge: HOME | End: 2024-12-18
Payer: COMMERCIAL

## 2024-12-18 ENCOUNTER — HOSPITAL ENCOUNTER (OUTPATIENT)
Dept: ULTRASOUND IMAGING | Facility: HOSPITAL | Age: 44
Discharge: HOME | End: 2024-12-18
Admitting: INTERNAL MEDICINE
Payer: COMMERCIAL

## 2024-12-18 DIAGNOSIS — G89.29 CHRONIC LOW BACK PAIN WITH SCIATICA, SCIATICA LATERALITY UNSPECIFIED, UNSPECIFIED BACK PAIN LATERALITY: ICD-10-CM

## 2024-12-18 DIAGNOSIS — M54.40 CHRONIC LOW BACK PAIN WITH SCIATICA, SCIATICA LATERALITY UNSPECIFIED, UNSPECIFIED BACK PAIN LATERALITY: ICD-10-CM

## 2024-12-18 DIAGNOSIS — R74.8 ELEVATED LIVER ENZYMES: ICD-10-CM

## 2024-12-18 PROCEDURE — 78306 BONE IMAGING WHOLE BODY: CPT

## 2024-12-18 PROCEDURE — A9503 TC99M MEDRONATE: HCPCS | Performed by: INTERNAL MEDICINE

## 2024-12-18 PROCEDURE — 76705 ECHO EXAM OF ABDOMEN: CPT

## 2024-12-18 PROCEDURE — 34310000005 TECHNETIUM MEDRONATE KIT: Performed by: INTERNAL MEDICINE

## 2024-12-18 RX ORDER — TC 99M MEDRONATE 20 MG/10ML
26.1 INJECTION, POWDER, LYOPHILIZED, FOR SOLUTION INTRAVENOUS
Status: COMPLETED | OUTPATIENT
Start: 2024-12-18 | End: 2024-12-18

## 2024-12-18 RX ADMIN — TC 99M MEDRONATE 26.1 MILLICURIE: 20 INJECTION, POWDER, LYOPHILIZED, FOR SOLUTION INTRAVENOUS at 09:17

## 2025-01-21 NOTE — PROGRESS NOTES
HEMATOLOGY ONCOLOGY OUTPATIENT FOLLOW-UP       Patient name: Stacy Grewal  : 1980  MRN: [id number]  Primary Care Physician: Brenda Sheikh MD  Referring Physician: Brenda Sheikh MD  Reason For Consult: Breast cancer    History of Present Illness:      2023: Ms. grewal came to this office seeking attention for the first time on this date.She had been under my care in [place], [place].  In 2020 she had noted an area of discoloration above the nipple on the left breast.  Immediately underneath she had a hard nodule.  Over time it did not seem to increase in size and was not associated to any other symptoms.  For this reason eventually she underwent a screening mammogram that reported a 3 cm high density, spiculated tumor at the 11 to 12 o'clock position.  It was associated to pleomorphic microcalcifications and prominent architectural distortion.  An ultrasound confirmed a 3.2 x 2.3 x 1 point centimeter tumor.  A stereotactic biopsy took place on 2020.  He reported a mammary carcinoma of no special type.  It was great to 2 3.  Estrogen receptors were moderately positive in 60% of the cells.  The progesterone receptors were as well strongly positive in 10% of the cells.  HER2 was negative Ki-67 was positive in 20% of the cells.  She had been seen by Dr. Sunni Barber who requested neoadjuvant chemotherapy both because of the location but also because of the size of the tumor.  She received a port.  She started treatment with AC on a dose-dense fashion, with growth factor support.  She attempted scalp cooling that she did not tolerate and she ended up with universal alopecia.  After 4 cycles of this combination she was started on paclitaxel which she received on an every 3-week schedule.  There was evidence of response with reduction in the size of the tumor.  She was taken to the operating room.  On 2020 she underwent an ultrasound-guided  lumpectomy with sentinel lymph node biopsy.  Residual invasive mammary carcinoma of no special type with apocrine features was present.  It measured 3 cm and was high-grade (3, 3, 2).  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  Of the met 2 sentinel lymph nodes submitted, neither had invasive disease.  All margins were negative.  The disease was staged as [id number].  She started ovarian suppression with leuprolide and hormonal manipulation with anastrozole.  At the time of the last visit with me she was tolerating the treatment reasonably well.    She then was seen by Dr. Prakash Escobedo and who suggested that the sertraline that she had been taking was not compatible with her treatments and switch to a different antidepressant.  This resulted in anxiety that was treated with temazepam.  A few days later she went through a difficult divorce and on 1 occasion took several tablets of temazepam.  This resulted in an admission to the hospital where she spent at least 3 days unresponsive.  Eventually she recovered.  She was placed back on the sertraline by her psychiatrist.    On the day of this visit she had several complaints.  She had become forgetful and had to write everything down.  She had a persistent headache and frequent smell of cigarette smoke even when there was none present that had been going on for several months without worsening.  She also complained of back pain with radiation to the left lower extremity along the posterior aspect of the thigh.  She had become nearly incontinent of both stool and urine but she had no melena or hematochezia.  She had continued to gain weight in spite of her efforts but he had not been able to exercise or change her diet pattern.  On exam there were no palpable lymph nodes.  The lungs were clear.  The heart regular.  Abdomen rounded, protuberant and soft.  Liver and spleen not enlarged and a tumor could not be identified.  There was no edema.  A decision was made to  continue with the same antineoplastic therapy.  She was also instructed to continue with what ever antidepressant her psychiatrist was prescribing as there was no formal contraindication of sertraline generally with any of the other SSRIs with the treatment she was receiving.  She was referred to gynecology for a Pap smear (she had not had one in more than 5 years) and for pelvic exam.  She was referred to social work to see if she could find health insurance through the health insurance exchange.    8/7/2023: Feeling somewhat better. The physical therapy has helped some. She has been eating well and without unintended weight loss. No fevers. Active and working full time. Tolerating the medication well. On exam no changes. The laboratory exams were reviewed and discussed with her. To continue with the same treatment. She is to see me in 2 months.     10/30/2023: Has not been feeling particularly well.  Has had a persistent headache that is not unusual for her but has been long-lasting.  She also has noted a small nodule on her scalp to increase in size.  She is eating well and her weight continues to go up.  She has had no chest pains and no more dyspnea than usual.  No abdominal pain.  She has maintained regular bowel activity and she has no dysuria.  No skin rash.  On exam no changes.  She does have a small nodule on the skin of the scalp on the right parietal region, a few centimeters posterior to the frontal region.  There are no associated skin changes and it is hard to palpation.  It does not appear to be a malignant deposit but I am not entirely sure.  The lungs are clear.  The heart is regular.  The abdomen is protuberant, soft and without hepatomegaly or splenomegaly.  There is no edema.  There is no skin rash.  I offered her referral to surgery for sampling of the subcutaneous nodule on the scalp though I am not convinced that it is malignant.  She is leery of that and anxious with procedures.  We will  postpone and see again the next time.  Her liver enzymes have persistently been elevated and I have asked her to allow me to do a CT of the abdomen to evaluate her liver.  I suspect steatohepatitis.  Discussed with her.    12/14/2023: Feeling about the same.  Frequently tired and frequently with headache but able to perform all her duties.  She sleeps poorly but not any different than usual.  She continues to eat reasonably well and has had no nausea, vomiting or unintended weight loss.  She has been free of pain.  On exam she is alert, conversant and in no distress.  She is well-oriented.  The lungs are clear.  The heart is regular.  The abdomen is protuberant but soft and without hepatomegaly or splenomegaly.  The laboratory exams were reviewed and discussed with her.  I reviewed independently the images of her scans and discussed with her.  No suggestion of metastatic disease.  She continues to tolerate the treatment with ovarian suppression and an aromatase inhibitor well.  Her last estradiol, in June of this year was suppressed.  Will continue to monitor.     7/12/2024: Feeling reasonably well and continues to work full time. Eating well and without unintended weight loss. No chest pain or cough. No abdominal pain. Complains of feelings of cold feet. No claudication. On exam alert and conversant. No jaundice. No oral lesions and respirations not labored. Lungs clear and heart regular. Abdomen soft. No edema. Adequate pedal and posterior tibialis pulses. Laboratory exams reviewed. Has subclinical hypothyroidism. Discussed with her.     11/22/2024: Feels reasonably well.  Again complains of pain in the lower back that radiates down and the lower extremity on the left.  This is particularly prominent if she is very active physically.  She works full-time and has no new limitations.  She eats well and has not lost weight.  She has been without increasing dyspnea or cough.  No diarrhea or dysuria.  On exam she is  alert and conversant.  Oriented and in no distress.  No jaundice.  The lungs are clear bilaterally and the heart regular.  There is no palpable adenopathy in the supraclavicular or axillary spaces.  Abdomen is soft.  No palpable tumors.  No edema.  Laboratory exams reviewed.  Discussed with her.  No suggestion of recurrent disease however, her liver enzymes at the time of the last visit were elevated.  I will check them today.  I have also requested a bone scan to make sure that her complaints are not related to a significant problem though on the last scan of the abdomen and pelvis no significant abnormalities were identified.    1/24/2025: Persist with back pain in the same location as before.  Lately, in addition, she has had some headaches.  No associated symptoms but at least once this was related to nausea and vomiting.  She is as active as usual and continues to work without limitations.  She continues to eat well and her weight has been stable.  No chest pains or cough.  No abdominal pain.  No change in bowel activity.  No dysuria.  On exam ill-appearing.  No distress.  No jaundice.  Lungs clear bilaterally and heart regular.  Abdomen protuberant.  I cannot feel any tumors.  Percussion of the vertebral bodies of the lower thoracic and lumbar spine does not result in pain.  Pressure on the left sacroiliac joint results in some relief.  No edema.  Laboratory exams reviewed.  Reviewed the images and the report of the bone scan.  L4 shows an area of increased tracer uptake.  Will investigate further with MRI and will obtain a MRI of the brain to investigate her headaches.  Discussed with her.  She will see me with the results.    Past Medical History:   Diagnosis Date    Anemia     Anxiety     Balance problem     Breast cancer     Left    Depression     Drug therapy     History of chemotherapy     7/22/2020. SEES DR ATKINS IN [place]    History of migraine     History of neuropathy     IN FEET    History of  UTI      Past Surgical History:   Procedure Laterality Date    APPENDECTOMY      BREAST BIOPSY Left     MARCH 2020. TREATMENT WITH CHEMO.COMPLETED IN JULY 2020    BREAST LUMPECTOMY WITH SENTINEL NODE BIOPSY Left 8/26/2020    Procedure: Left breast ultrasound-guided lumpectomy, left axillary sentinel lymph node biopsy;  Surgeon: Sunni Barber MD;  Location: [facility];  Service: General;  Laterality: Left;    BREAST SURGERY Bilateral 8/26/2020    Procedure: LEFT BREAST ONCOPLASTIC CLOSURE RIGHT BREAST REDUCTION FOR SYMMETERY;  Surgeon: Angela Davidson MD;  Location: [facility];  Service: Plastics;  Laterality: Bilateral;    VENOUS ACCESS DEVICE (PORT) INSERTION Right 4/7/2020    Procedure: RIGHT port placement.;  Surgeon: Sunni Barber MD;  Location: [facility];  Service: General;  Laterality: Right;       Current Outpatient Medications:     anastrozole (ARIMIDEX) 1 MG tablet, Take 1 tablet by mouth once daily, Disp: 90 tablet, Rfl: 2    atomoxetine (STRATTERA) 40 MG capsule, Take 1 capsule by mouth Daily., Disp: , Rfl:     sertraline (ZOLOFT) 100 MG tablet, Take 1 tablet by mouth 2 (Two) Times a Day., Disp: , Rfl:     methocarbamol (ROBAXIN) 750 MG tablet, TAKE 1 TABLET BY MOUTH EVERY 8 HOURS AS NEEDED FOR MUSCLE RELAXATION (Patient not taking: Reported on 7/12/2024), Disp: , Rfl:     No Known Allergies    Family History   Problem Relation Age of Onset    Breast cancer Paternal Aunt         GREAT PATERNAL AUNT     Breast cancer Cousin 35        PATERNAL COUSIN     Malig Hyperthermia Neg Hx      Cancer-related family history includes Breast cancer in her paternal aunt; Breast cancer (age of onset: 35) in her cousin.    Social History     Tobacco Use    Smoking status: Never    Smokeless tobacco: Never   Vaping Use    Vaping status: Never Used   Substance Use Topics    Alcohol use: Yes     Comment: Occasional    Drug use: Never     Social History     Social History Narrative    Not on  file     ROS:   Review of Systems   Constitutional:  Positive for activity change and fatigue. Negative for appetite change, chills, diaphoresis, fever and unexpected weight change.   HENT:  Negative for congestion, dental problem, drooling, ear discharge, ear pain, facial swelling, hearing loss, mouth sores, nosebleeds, postnasal drip, rhinorrhea, sinus pressure, sinus pain, sneezing, sore throat, tinnitus, trouble swallowing and voice change.    Eyes:  Negative for photophobia, pain, discharge, redness, itching and visual disturbance.   Respiratory:  Negative for apnea, cough, choking, chest tightness, shortness of breath, wheezing and stridor.    Cardiovascular:  Negative for chest pain, palpitations and leg swelling.   Gastrointestinal:  Negative for abdominal distention, abdominal pain, anal bleeding, blood in stool, constipation, diarrhea, nausea, rectal pain and vomiting.   Endocrine: Negative for cold intolerance, heat intolerance, polydipsia and polyuria.   Genitourinary:  Negative for decreased urine volume, difficulty urinating, dysuria, flank pain, frequency, genital sores, hematuria and urgency.   Musculoskeletal:  Positive for back pain. Negative for arthralgias, gait problem, joint swelling, myalgias, neck pain and neck stiffness.   Skin:  Negative for color change, pallor and rash.   Neurological:  Positive for headaches. Negative for dizziness, tremors, seizures, syncope, facial asymmetry, speech difficulty, weakness, light-headedness and numbness.   Hematological:  Negative for adenopathy. Does not bruise/bleed easily.   Psychiatric/Behavioral:  Negative for agitation, behavioral problems, confusion, decreased concentration, hallucinations, self-injury, sleep disturbance and suicidal ideas. The patient is not nervous/anxious.      Objective:    Vital Signs:  Vitals:    01/24/25 1434   BP: 130/86   Pulse: 101   Resp: 15   Temp: 97.9 °F (36.6 °C)   SpO2: 97%   Weight: 91.5 kg (201 lb 12.8 oz)  "  Height: 160 cm (63\")   PainSc: 0-No pain     Body mass index is 35.75 kg/m².    ECOG  (0) Fully active, able to carry on all predisease performance without restriction    Physical Exam:   Physical Exam  Constitutional:       General: She is not in acute distress.     Appearance: She is ill-appearing. She is not toxic-appearing or diaphoretic.      Comments: Well-built, well oriented and in no distress.  Conversant and in good spirits.  BMI greater than 37 kg/m².   HENT:      Head: Normocephalic and atraumatic.      Right Ear: External ear normal.      Left Ear: External ear normal.      Nose: Nose normal.      Mouth/Throat:      Mouth: Mucous membranes are moist.      Pharynx: Oropharynx is clear. No oropharyngeal exudate or posterior oropharyngeal erythema.   Eyes:      General: No scleral icterus.        Right eye: No discharge.         Left eye: No discharge.      Conjunctiva/sclera: Conjunctivae normal.      Pupils: Pupils are equal, round, and reactive to light.   Cardiovascular:      Rate and Rhythm: Normal rate and regular rhythm.      Pulses: Normal pulses.      Heart sounds: No murmur heard.     No friction rub. No gallop.   Pulmonary:      Effort: No respiratory distress.      Breath sounds: No stridor. No wheezing, rhonchi or rales.   Abdominal:      General: Bowel sounds are normal. There is no distension.      Palpations: Abdomen is soft. There is no mass.      Tenderness: There is no abdominal tenderness. There is no right CVA tenderness, left CVA tenderness, guarding or rebound.      Hernia: No hernia is present.      Comments: Rounded, protuberant, soft and nontender.  Liver and spleen do not appear enlarged.  No tumors are present.   Musculoskeletal:         General: No swelling, tenderness, deformity or signs of injury.      Cervical back: No rigidity.      Right lower leg: No edema.      Left lower leg: No edema.      Comments: Prescription of the lower thoracic and lumbar spine does not result " in pain.  No tenderness.  There is some relief when applying pressure to the left sacroiliac joint.   Lymphadenopathy:      Cervical: No cervical adenopathy.   Skin:     Coloration: Skin is not jaundiced.      Findings: No bruising, lesion or rash.      Comments: The subcutaneous nodule in the scalp is palpable.  Soft and mobile.   Neurological:      General: No focal deficit present.      Mental Status: She is alert and oriented to person, place, and time.      Cranial Nerves: No cranial nerve deficit.      Motor: No weakness.      Gait: Gait normal.   Psychiatric:         Mood and Affect: Mood normal.         Behavior: Behavior normal.         Thought Content: Thought content normal.         Judgment: Judgment normal.     MATT Chew MD performed the physical exam on 1/24/2025 as documented above.    Lab Results   Component Value Date    GLUCOSE 107 (H) 11/22/2024    BUN 8 11/22/2024    CREATININE 0.89 11/22/2024    EGFRIFNONA 84 08/24/2020    BCR 9.0 11/22/2024    K 3.6 11/22/2024    CO2 27.3 11/22/2024    CALCIUM 9.7 11/22/2024    ALBUMIN 4.6 11/22/2024    LABIL2 1.3 07/22/2020    AST 43 (H) 11/22/2024    ALT 46 (H) 11/22/2024     Assessment & Plan     1.  ypT2 N0 M0 estrogen receptor and progesterone receptor positive, HER2 negative moderately differentiated invasive carcinoma of the left breast.  Underwent neoadjuvant chemotherapy with AC, followed by paclitaxel.  Had a partial response to treatment and this was followed by lumpectomy and sentinel lymph node biopsy, radiation therapy and ovarian suppression and hormonal manipulation with anastrozole.  Remains on ovarian suppression and treatment with anastrozole.  Continues to tolerate well.  No change.  2.  Fatigue/memory difficulties: Improved.  Continues to work without new limitations.  3.  Back pain with sciatica pattern that persists.  The bone scan reveals abnormal tracer uptake at the L4 vertebral body.  Will investigate further.  MRI of the  spine has been requested.  Have also requested MRI of the head to investigate the headaches described above.  4.  Abnormal liver enzymes: Persistent.  Ultrasound of the abdomen did not suggest metastatic disease.  No bile duct obstruction or other changes.  5.  Reviewed the images of the bone scan and the report.  Discussed the results with her.  Reviewed the ultrasound.  Reviewed laboratory exams.  6.  She will return to see me in approximately 4 weeks with results.    Good Chew MD on 1/24/2025 at 1551

## 2025-01-24 ENCOUNTER — OFFICE VISIT (OUTPATIENT)
Dept: ONCOLOGY | Facility: CLINIC | Age: 45
End: 2025-01-24
Payer: COMMERCIAL

## 2025-01-24 ENCOUNTER — LAB (OUTPATIENT)
Dept: LAB | Facility: HOSPITAL | Age: 45
End: 2025-01-24
Payer: COMMERCIAL

## 2025-01-24 VITALS
SYSTOLIC BLOOD PRESSURE: 130 MMHG | DIASTOLIC BLOOD PRESSURE: 86 MMHG | HEIGHT: 63 IN | BODY MASS INDEX: 35.75 KG/M2 | WEIGHT: 201.8 LBS | RESPIRATION RATE: 15 BRPM | OXYGEN SATURATION: 97 % | HEART RATE: 101 BPM | TEMPERATURE: 97.9 F

## 2025-01-24 DIAGNOSIS — R51.9 GENERALIZED HEADACHES: ICD-10-CM

## 2025-01-24 DIAGNOSIS — Z17.0 CARCINOMA OF UPPER-INNER QUADRANT OF LEFT BREAST IN FEMALE, ESTROGEN RECEPTOR POSITIVE: Primary | ICD-10-CM

## 2025-01-24 DIAGNOSIS — G89.29 CHRONIC LOW BACK PAIN, UNSPECIFIED BACK PAIN LATERALITY, UNSPECIFIED WHETHER SCIATICA PRESENT: ICD-10-CM

## 2025-01-24 DIAGNOSIS — M54.50 CHRONIC LOW BACK PAIN, UNSPECIFIED BACK PAIN LATERALITY, UNSPECIFIED WHETHER SCIATICA PRESENT: ICD-10-CM

## 2025-01-24 DIAGNOSIS — C50.212 CARCINOMA OF UPPER-INNER QUADRANT OF LEFT BREAST IN FEMALE, ESTROGEN RECEPTOR POSITIVE: Primary | ICD-10-CM

## 2025-01-24 DIAGNOSIS — R94.8 ABNORMAL BONE SCAN OF LUMBAR SPINE: ICD-10-CM

## 2025-01-24 LAB
ALBUMIN SERPL-MCNC: 4.8 G/DL (ref 3.5–5.2)
ALBUMIN/GLOB SERPL: 1.6 G/DL
ALP SERPL-CCNC: 186 U/L (ref 39–117)
ALT SERPL W P-5'-P-CCNC: 34 U/L (ref 1–33)
ANION GAP SERPL CALCULATED.3IONS-SCNC: 10.4 MMOL/L (ref 5–15)
AST SERPL-CCNC: 39 U/L (ref 1–32)
BASOPHILS # BLD AUTO: 0.01 10*3/MM3 (ref 0–0.2)
BASOPHILS NFR BLD AUTO: 0.1 % (ref 0–1.5)
BILIRUB SERPL-MCNC: 0.3 MG/DL (ref 0–1.2)
BUN SERPL-MCNC: 8 MG/DL (ref 6–20)
BUN/CREAT SERPL: 10.4 (ref 7–25)
CALCIUM SPEC-SCNC: 10 MG/DL (ref 8.6–10.5)
CHLORIDE SERPL-SCNC: 99 MMOL/L (ref 98–107)
CO2 SERPL-SCNC: 28.6 MMOL/L (ref 22–29)
CREAT SERPL-MCNC: 0.77 MG/DL (ref 0.57–1)
DEPRECATED RDW RBC AUTO: 39.4 FL (ref 37–54)
EGFRCR SERPLBLD CKD-EPI 2021: 98.9 ML/MIN/1.73
EOSINOPHIL # BLD AUTO: 0.26 10*3/MM3 (ref 0–0.4)
EOSINOPHIL NFR BLD AUTO: 3.6 % (ref 0.3–6.2)
ERYTHROCYTE [DISTWIDTH] IN BLOOD BY AUTOMATED COUNT: 13.1 % (ref 12.3–15.4)
GLOBULIN UR ELPH-MCNC: 3 GM/DL
GLUCOSE SERPL-MCNC: 72 MG/DL (ref 65–99)
HCT VFR BLD AUTO: 39.4 % (ref 34–46.6)
HGB BLD-MCNC: 13.4 G/DL (ref 12–15.9)
HOLD SPECIMEN: NORMAL
LYMPHOCYTES # BLD AUTO: 1.69 10*3/MM3 (ref 0.7–3.1)
LYMPHOCYTES NFR BLD AUTO: 23.3 % (ref 19.6–45.3)
MCH RBC QN AUTO: 28.6 PG (ref 26.6–33)
MCHC RBC AUTO-ENTMCNC: 34 G/DL (ref 31.5–35.7)
MCV RBC AUTO: 84 FL (ref 79–97)
MONOCYTES # BLD AUTO: 0.47 10*3/MM3 (ref 0.1–0.9)
MONOCYTES NFR BLD AUTO: 6.5 % (ref 5–12)
NEUTROPHILS NFR BLD AUTO: 4.82 10*3/MM3 (ref 1.7–7)
NEUTROPHILS NFR BLD AUTO: 66.5 % (ref 42.7–76)
PLATELET # BLD AUTO: 203 10*3/MM3 (ref 140–450)
PMV BLD AUTO: 8.7 FL (ref 6–12)
POTASSIUM SERPL-SCNC: 3.9 MMOL/L (ref 3.5–5.2)
PROT SERPL-MCNC: 7.8 G/DL (ref 6–8.5)
RBC # BLD AUTO: 4.69 10*6/MM3 (ref 3.77–5.28)
SODIUM SERPL-SCNC: 138 MMOL/L (ref 136–145)
WBC NRBC COR # BLD AUTO: 7.25 10*3/MM3 (ref 3.4–10.8)

## 2025-01-24 PROCEDURE — 80053 COMPREHEN METABOLIC PANEL: CPT | Performed by: INTERNAL MEDICINE

## 2025-01-24 PROCEDURE — 99214 OFFICE O/P EST MOD 30 MIN: CPT | Performed by: INTERNAL MEDICINE

## 2025-01-24 PROCEDURE — 85025 COMPLETE CBC W/AUTO DIFF WBC: CPT

## 2025-01-24 PROCEDURE — 36415 COLL VENOUS BLD VENIPUNCTURE: CPT

## 2025-02-08 ENCOUNTER — HOSPITAL ENCOUNTER (OUTPATIENT)
Dept: MRI IMAGING | Facility: HOSPITAL | Age: 45
Discharge: HOME | End: 2025-02-08
Payer: COMMERCIAL

## 2025-02-08 DIAGNOSIS — M54.50 CHRONIC LOW BACK PAIN, UNSPECIFIED BACK PAIN LATERALITY, UNSPECIFIED WHETHER SCIATICA PRESENT: ICD-10-CM

## 2025-02-08 DIAGNOSIS — C50.212 CARCINOMA OF UPPER-INNER QUADRANT OF LEFT BREAST IN FEMALE, ESTROGEN RECEPTOR POSITIVE: ICD-10-CM

## 2025-02-08 DIAGNOSIS — R51.9 GENERALIZED HEADACHES: ICD-10-CM

## 2025-02-08 DIAGNOSIS — Z17.0 CARCINOMA OF UPPER-INNER QUADRANT OF LEFT BREAST IN FEMALE, ESTROGEN RECEPTOR POSITIVE: ICD-10-CM

## 2025-02-08 DIAGNOSIS — G89.29 CHRONIC LOW BACK PAIN, UNSPECIFIED BACK PAIN LATERALITY, UNSPECIFIED WHETHER SCIATICA PRESENT: ICD-10-CM

## 2025-02-08 DIAGNOSIS — R94.8 ABNORMAL BONE SCAN OF LUMBAR SPINE: ICD-10-CM

## 2025-02-08 PROCEDURE — 70553 MRI BRAIN STEM W/O & W/DYE: CPT

## 2025-02-08 PROCEDURE — 72158 MRI LUMBAR SPINE W/O & W/DYE: CPT

## 2025-02-08 PROCEDURE — 25010000002 GADOTERIDOL PER 1 ML: Performed by: INTERNAL MEDICINE

## 2025-02-08 PROCEDURE — A9579 GAD-BASE MR CONTRAST NOS,1ML: HCPCS | Performed by: INTERNAL MEDICINE

## 2025-02-08 RX ADMIN — GADOTERIDOL 19 ML: 279.3 INJECTION, SOLUTION INTRAVENOUS at 08:04

## 2025-02-19 NOTE — PROGRESS NOTES
HEMATOLOGY ONCOLOGY OUTPATIENT FOLLOW-UP       Patient name: Stacy Grewal  : 1980  MRN: [id number]  Primary Care Physician: Brenda Sheikh MD  Referring Physician: Brenda Sheikh MD  Reason For Consult: Breast cancer    History of Present Illness:      2023: Ms. grewal came to this office seeking attention for the first time on this date.She had been under my care in [place], [place].  In 2020 she had noted an area of discoloration above the nipple on the left breast.  Immediately underneath she had a hard nodule.  Over time it did not seem to increase in size and was not associated to any other symptoms.  For this reason eventually she underwent a screening mammogram that reported a 3 cm high density, spiculated tumor at the 11 to 12 o'clock position.  It was associated to pleomorphic microcalcifications and prominent architectural distortion.  An ultrasound confirmed a 3.2 x 2.3 x 1 point centimeter tumor.  A stereotactic biopsy took place on 2020.  He reported a mammary carcinoma of no special type.  It was great to 2 3.  Estrogen receptors were moderately positive in 60% of the cells.  The progesterone receptors were as well strongly positive in 10% of the cells.  HER2 was negative Ki-67 was positive in 20% of the cells.  She had been seen by Dr. Sunni Barber who requested neoadjuvant chemotherapy both because of the location but also because of the size of the tumor.  She received a port.  She started treatment with AC on a dose-dense fashion, with growth factor support.  She attempted scalp cooling that she did not tolerate and she ended up with universal alopecia.  After 4 cycles of this combination she was started on paclitaxel which she received on an every 3-week schedule.  There was evidence of response with reduction in the size of the tumor.  She was taken to the operating room.  On 2020 she underwent an ultrasound-guided  lumpectomy with sentinel lymph node biopsy.  Residual invasive mammary carcinoma of no special type with apocrine features was present.  It measured 3 cm and was high-grade (3, 3, 2).  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  Of the met 2 sentinel lymph nodes submitted, neither had invasive disease.  All margins were negative.  The disease was staged as [id number].  She started ovarian suppression with leuprolide and hormonal manipulation with anastrozole.  At the time of the last visit with me she was tolerating the treatment reasonably well.    She then was seen by Dr. Prakash Escobedo and who suggested that the sertraline that she had been taking was not compatible with her treatments and switch to a different antidepressant.  This resulted in anxiety that was treated with temazepam.  A few days later she went through a difficult divorce and on 1 occasion took several tablets of temazepam.  This resulted in an admission to the hospital where she spent at least 3 days unresponsive.  Eventually she recovered.  She was placed back on the sertraline by her psychiatrist.    On the day of this visit she had several complaints.  She had become forgetful and had to write everything down.  She had a persistent headache and frequent smell of cigarette smoke even when there was none present that had been going on for several months without worsening.  She also complained of back pain with radiation to the left lower extremity along the posterior aspect of the thigh.  She had become nearly incontinent of both stool and urine but she had no melena or hematochezia.  She had continued to gain weight in spite of her efforts but he had not been able to exercise or change her diet pattern.  On exam there were no palpable lymph nodes.  The lungs were clear.  The heart regular.  Abdomen rounded, protuberant and soft.  Liver and spleen not enlarged and a tumor could not be identified.  There was no edema.  A decision was made to  continue with the same antineoplastic therapy.  She was also instructed to continue with what ever antidepressant her psychiatrist was prescribing as there was no formal contraindication of sertraline generally with any of the other SSRIs with the treatment she was receiving.  She was referred to gynecology for a Pap smear (she had not had one in more than 5 years) and for pelvic exam.  She was referred to social work to see if she could find health insurance through the health insurance exchange.    8/7/2023: Feeling somewhat better. The physical therapy has helped some. She has been eating well and without unintended weight loss. No fevers. Active and working full time. Tolerating the medication well. On exam no changes. The laboratory exams were reviewed and discussed with her. To continue with the same treatment. She is to see me in 2 months.     10/30/2023: Has not been feeling particularly well.  Has had a persistent headache that is not unusual for her but has been long-lasting.  She also has noted a small nodule on her scalp to increase in size.  She is eating well and her weight continues to go up.  She has had no chest pains and no more dyspnea than usual.  No abdominal pain.  She has maintained regular bowel activity and she has no dysuria.  No skin rash.  On exam no changes.  She does have a small nodule on the skin of the scalp on the right parietal region, a few centimeters posterior to the frontal region.  There are no associated skin changes and it is hard to palpation.  It does not appear to be a malignant deposit but I am not entirely sure.  The lungs are clear.  The heart is regular.  The abdomen is protuberant, soft and without hepatomegaly or splenomegaly.  There is no edema.  There is no skin rash.  I offered her referral to surgery for sampling of the subcutaneous nodule on the scalp though I am not convinced that it is malignant.  She is leery of that and anxious with procedures.  We will  postpone and see again the next time.  Her liver enzymes have persistently been elevated and I have asked her to allow me to do a CT of the abdomen to evaluate her liver.  I suspect steatohepatitis.  Discussed with her.    12/14/2023: Feeling about the same.  Frequently tired and frequently with headache but able to perform all her duties.  She sleeps poorly but not any different than usual.  She continues to eat reasonably well and has had no nausea, vomiting or unintended weight loss.  She has been free of pain.  On exam she is alert, conversant and in no distress.  She is well-oriented.  The lungs are clear.  The heart is regular.  The abdomen is protuberant but soft and without hepatomegaly or splenomegaly.  The laboratory exams were reviewed and discussed with her.  I reviewed independently the images of her scans and discussed with her.  No suggestion of metastatic disease.  She continues to tolerate the treatment with ovarian suppression and an aromatase inhibitor well.  Her last estradiol, in June of this year was suppressed.  Will continue to monitor.     7/12/2024: Feeling reasonably well and continues to work full time. Eating well and without unintended weight loss. No chest pain or cough. No abdominal pain. Complains of feelings of cold feet. No claudication. On exam alert and conversant. No jaundice. No oral lesions and respirations not labored. Lungs clear and heart regular. Abdomen soft. No edema. Adequate pedal and posterior tibialis pulses. Laboratory exams reviewed. Has subclinical hypothyroidism. Discussed with her.     11/22/2024: Feels reasonably well.  Again complains of pain in the lower back that radiates down and the lower extremity on the left.  This is particularly prominent if she is very active physically.  She works full-time and has no new limitations.  She eats well and has not lost weight.  She has been without increasing dyspnea or cough.  No diarrhea or dysuria.  On exam she is  alert and conversant.  Oriented and in no distress.  No jaundice.  The lungs are clear bilaterally and the heart regular.  There is no palpable adenopathy in the supraclavicular or axillary spaces.  Abdomen is soft.  No palpable tumors.  No edema.  Laboratory exams reviewed.  Discussed with her.  No suggestion of recurrent disease however, her liver enzymes at the time of the last visit were elevated.  I will check them today.  I have also requested a bone scan to make sure that her complaints are not related to a significant problem though on the last scan of the abdomen and pelvis no significant abnormalities were identified.    1/24/2025: Persist with back pain in the same location as before.  Lately, in addition, she has had some headaches.  No associated symptoms but at least once this was related to nausea and vomiting.  She is as active as usual and continues to work without limitations.  She continues to eat well and her weight has been stable.  No chest pains or cough.  No abdominal pain.  No change in bowel activity.  No dysuria.  On exam ill-appearing.  No distress.  No jaundice.  Lungs clear bilaterally and heart regular.  Abdomen protuberant.  I cannot feel any tumors.  Percussion of the vertebral bodies of the lower thoracic and lumbar spine does not result in pain.  Pressure on the left sacroiliac joint results in some relief.  No edema.  Laboratory exams reviewed.  Reviewed the images and the report of the bone scan.  L4 shows an area of increased tracer uptake.  Will investigate further with MRI and will obtain a MRI of the brain to investigate her headaches.  Discussed with her.  She will see me with the results.    2/21/2025: Feels well today.  Has not had much back pain.  Also no headaches.  She reported a respiratory infection that resulted in persistent cough that is present even today but she has no dyspnea.  No chest pains.  No abdominal pain or diarrhea.  On exam alert, conversant and  oriented.  No distress.  No jaundice.  Lungs clear bilaterally.  Heart regular.  Abdomen soft.  No edema.  Laboratory exams reviewed.  Reviewed the images and the report of the scans.  There is no suggestion of metastatic disease.  Continue same treatment.  See me again in 3 months.    Past Medical History:   Diagnosis Date    Anemia     Anxiety     Balance problem     Breast cancer     Left    Depression     Drug therapy     History of chemotherapy     7/22/2020. SE[place] [facility] [place]    History of migraine     History of neuropathy     IN FEET    History of UTI      Past Surgical History:   Procedure Laterality Date    APPENDECTOMY      BREAST BIOPSY Left     MARCH 2020. TREATMENT WITH CHEMO.COMPLETED IN JULY 2020    BREAST LUMPECTOMY WITH SENTINEL NODE BIOPSY Left 8/26/2020    Procedure: Left breast ultrasound-guided lumpectomy, left axillary sentinel lymph node biopsy;  Surgeon: Sunni Barber MD;  Location: [facility];  Service: General;  Laterality: Left;    BREAST SURGERY Bilateral 8/26/2020    Procedure: LEFT BREAST ONCOPLASTIC CLOSURE RIGHT BREAST REDUCTION FOR SYMMETERY;  Surgeon: Angela Davidson MD;  Location: [facility];  Service: Plastics;  Laterality: Bilateral;    VENOUS ACCESS DEVICE (PORT) INSERTION Right 4/7/2020    Procedure: RIGHT port placement.;  Surgeon: Sunni Barber MD;  Location: [facility];  Service: General;  Laterality: Right;       Current Outpatient Medications:     anastrozole (ARIMIDEX) 1 MG tablet, Take 1 tablet by mouth once daily, Disp: 90 tablet, Rfl: 2    atomoxetine (STRATTERA) 40 MG capsule, Take 1 capsule by mouth Daily., Disp: , Rfl:     sertraline (ZOLOFT) 100 MG tablet, Take 1 tablet by mouth 2 (Two) Times a Day., Disp: , Rfl:   No current facility-administered medications for this visit.    Facility-Administered Medications Ordered in Other Visits:     leuprolide (LUPRON) injection 11.25 mg, 11.25 mg, Intramuscular, Once, Naina  MD Good    No Known Allergies    Family History   Problem Relation Age of Onset    Breast cancer Paternal Aunt         GREAT PATERNAL AUNT     Breast cancer Cousin 35        PATERNAL COUSIN     [place] Hyperthermia Neg Hx      Cancer-related family history includes Breast cancer in her paternal aunt; Breast cancer (age of onset: 35) in her cousin.    Social History     Tobacco Use    Smoking status: Never    Smokeless tobacco: Never   Vaping Use    Vaping status: Never Used   Substance Use Topics    Alcohol use: Yes     Comment: Occasional    Drug use: Never     Social History     Social History Narrative    Not on file     ROS:   Review of Systems   Constitutional:  Positive for activity change and fatigue. Negative for appetite change, chills, diaphoresis, fever and unexpected weight change.   HENT:  Negative for congestion, dental problem, drooling, ear discharge, ear pain, facial swelling, hearing loss, mouth sores, nosebleeds, postnasal drip, rhinorrhea, sinus pressure, sinus pain, sneezing, sore throat, tinnitus, trouble swallowing and voice change.    Eyes:  Negative for photophobia, pain, discharge, redness, itching and visual disturbance.   Respiratory:  Negative for apnea, cough, choking, chest tightness, shortness of breath, wheezing and stridor.    Cardiovascular:  Negative for chest pain, palpitations and leg swelling.   Gastrointestinal:  Negative for abdominal distention, abdominal pain, anal bleeding, blood in stool, constipation, diarrhea, nausea, rectal pain and vomiting.   Endocrine: Negative for cold intolerance, heat intolerance, polydipsia and polyuria.   Genitourinary:  Negative for decreased urine volume, difficulty urinating, dysuria, flank pain, frequency, genital sores, hematuria and urgency.   Musculoskeletal:  Positive for back pain. Negative for arthralgias, gait problem, joint swelling, myalgias, neck pain and neck stiffness.   Skin:  Negative for color change, pallor and rash.  "  Neurological:  Positive for headaches. Negative for dizziness, tremors, seizures, syncope, facial asymmetry, speech difficulty, weakness, light-headedness and numbness.   Hematological:  Negative for adenopathy. Does not bruise/bleed easily.   Psychiatric/Behavioral:  Negative for agitation, behavioral problems, confusion, decreased concentration, hallucinations, self-injury, sleep disturbance and suicidal ideas. The patient is not nervous/anxious.      Objective:    Vital Signs:  Vitals:    02/21/25 1328   BP: 125/80   Pulse: 94   Resp: 13   Temp: 97.4 °F (36.3 °C)   SpO2: 98%   Weight: 92.4 kg (203 lb 12.8 oz)   Height: 160 cm (63\")   PainSc: 0-No pain     Body mass index is 36.1 kg/m².    ECOG  (0) Fully active, able to carry on all predisease performance without restriction    Physical Exam:   Physical Exam  Constitutional:       General: She is not in acute distress.     Appearance: She is ill-appearing. She is not toxic-appearing or diaphoretic.      Comments: Well-built, well oriented and in no distress.  Conversant and in good spirits.  BMI greater than 37 kg/m².   HENT:      Head: Normocephalic and atraumatic.      Right Ear: External ear normal.      Left Ear: External ear normal.      Nose: Nose normal.      Mouth/Throat:      Mouth: Mucous membranes are moist.      Pharynx: Oropharynx is clear. No oropharyngeal exudate or posterior oropharyngeal erythema.   Eyes:      General: No scleral icterus.        Right eye: No discharge.         Left eye: No discharge.      Conjunctiva/sclera: Conjunctivae normal.      Pupils: Pupils are equal, round, and reactive to light.   Cardiovascular:      Rate and Rhythm: Normal rate and regular rhythm.      Pulses: Normal pulses.      Heart sounds: No murmur heard.     No friction rub. No gallop.   Pulmonary:      Effort: No respiratory distress.      Breath sounds: No stridor. No wheezing, rhonchi or rales.   Abdominal:      General: Bowel sounds are normal. There is " no distension.      Palpations: Abdomen is soft. There is no mass.      Tenderness: There is no abdominal tenderness. There is no right CVA tenderness, left CVA tenderness, guarding or rebound.      Hernia: No hernia is present.      Comments: Rounded, protuberant, soft and nontender.  Liver and spleen do not appear enlarged.  No tumors are present.   Musculoskeletal:         General: No tenderness, deformity or signs of injury.      Cervical back: No rigidity.      Right lower leg: No edema.      Left lower leg: No edema.   Lymphadenopathy:      Cervical: No cervical adenopathy.   Skin:     Coloration: Skin is not jaundiced or pale.      Findings: No bruising, lesion or rash.   Neurological:      General: No focal deficit present.      Mental Status: She is alert and oriented to person, place, and time.      Cranial Nerves: No cranial nerve deficit.   Psychiatric:         Mood and Affect: Mood normal.         Behavior: Behavior normal.         Thought Content: Thought content normal.         Judgment: Judgment normal.     PARMINDER RODRIGUEZ performed the physical exam on 2/21/2025 as documented above.    Lab Results   Component Value Date    GLUCOSE 72 01/24/2025    BUN 8 01/24/2025    CREATININE 0.77 01/24/2025    EGFRIFNONA 84 08/24/2020    BCR 10.4 01/24/2025    K 3.9 01/24/2025    CO2 28.6 01/24/2025    CALCIUM 10.0 01/24/2025    ALBUMIN 4.8 01/24/2025    LABIL2 1.3 07/22/2020    AST 39 (H) 01/24/2025    ALT 34 (H) 01/24/2025     Assessment & Plan     1.  ypT2 N0 M0 estrogen receptor and progesterone receptor positive, HER2 negative moderately differentiated invasive carcinoma of the left breast.  Underwent neoadjuvant chemotherapy with AC, followed by paclitaxel.  Had a partial response to treatment and this was followed by lumpectomy and sentinel lymph node biopsy, radiation therapy and ovarian suppression and hormonal manipulation with anastrozole.  Remains on ovarian suppression and treatment with anastrozole.  Same  tolerance.  2.  Fatigue/memory difficulties: Improved.  She remains functional.  3.  Back pain with sciatica pattern that persists.  MRI of the spine does not suggest metastatic disease.  MRI of the brain is as well within the normal range.  4.  Abnormal liver enzymes: Persist.  No progression.  Continue to follow.  5.  Reviewed with her the images and the report of the MRI.  Discussed the results with her.  6.  She will return to see me in 3 months.  Continue with the same treatment.    Good Chew MD on 2/21/2025 at 1401.

## 2025-02-21 ENCOUNTER — OFFICE VISIT (OUTPATIENT)
Dept: ONCOLOGY | Facility: CLINIC | Age: 45
End: 2025-02-21
Payer: COMMERCIAL

## 2025-02-21 ENCOUNTER — LAB (OUTPATIENT)
Dept: LAB | Facility: HOSPITAL | Age: 45
End: 2025-02-21
Payer: COMMERCIAL

## 2025-02-21 ENCOUNTER — HOSPITAL ENCOUNTER (OUTPATIENT)
Dept: ONCOLOGY | Facility: HOSPITAL | Age: 45
Discharge: HOME | End: 2025-02-21
Payer: COMMERCIAL

## 2025-02-21 VITALS
SYSTOLIC BLOOD PRESSURE: 125 MMHG | RESPIRATION RATE: 13 BRPM | HEART RATE: 94 BPM | BODY MASS INDEX: 36.11 KG/M2 | HEIGHT: 63 IN | TEMPERATURE: 97.4 F | OXYGEN SATURATION: 98 % | DIASTOLIC BLOOD PRESSURE: 80 MMHG | WEIGHT: 203.8 LBS

## 2025-02-21 DIAGNOSIS — Z17.0 CARCINOMA OF UPPER-INNER QUADRANT OF LEFT BREAST IN FEMALE, ESTROGEN RECEPTOR POSITIVE: Primary | ICD-10-CM

## 2025-02-21 DIAGNOSIS — N95.9 PREMENOPAUSAL PATIENT: Primary | ICD-10-CM

## 2025-02-21 DIAGNOSIS — C50.212 CARCINOMA OF UPPER-INNER QUADRANT OF LEFT BREAST IN FEMALE, ESTROGEN RECEPTOR POSITIVE: ICD-10-CM

## 2025-02-21 DIAGNOSIS — C50.212 CARCINOMA OF UPPER-INNER QUADRANT OF LEFT BREAST IN FEMALE, ESTROGEN RECEPTOR POSITIVE: Primary | ICD-10-CM

## 2025-02-21 DIAGNOSIS — Z17.0 CARCINOMA OF UPPER-INNER QUADRANT OF LEFT BREAST IN FEMALE, ESTROGEN RECEPTOR POSITIVE: ICD-10-CM

## 2025-02-21 LAB
BASOPHILS # BLD AUTO: 0.02 10*3/MM3 (ref 0–0.2)
BASOPHILS NFR BLD AUTO: 0.2 % (ref 0–1.5)
DEPRECATED RDW RBC AUTO: 44.2 FL (ref 37–54)
EOSINOPHIL # BLD AUTO: 0.3 10*3/MM3 (ref 0–0.4)
EOSINOPHIL NFR BLD AUTO: 3.7 % (ref 0.3–6.2)
ERYTHROCYTE [DISTWIDTH] IN BLOOD BY AUTOMATED COUNT: 14.2 % (ref 12.3–15.4)
HCT VFR BLD AUTO: 41 % (ref 34–46.6)
HGB BLD-MCNC: 13.4 G/DL (ref 12–15.9)
HOLD SPECIMEN: NORMAL
HOLD SPECIMEN: NORMAL
LYMPHOCYTES # BLD AUTO: 1.9 10*3/MM3 (ref 0.7–3.1)
LYMPHOCYTES NFR BLD AUTO: 23.2 % (ref 19.6–45.3)
MCH RBC QN AUTO: 28.6 PG (ref 26.6–33)
MCHC RBC AUTO-ENTMCNC: 32.7 G/DL (ref 31.5–35.7)
MCV RBC AUTO: 87.4 FL (ref 79–97)
MONOCYTES # BLD AUTO: 0.39 10*3/MM3 (ref 0.1–0.9)
MONOCYTES NFR BLD AUTO: 4.8 % (ref 5–12)
NEUTROPHILS NFR BLD AUTO: 5.59 10*3/MM3 (ref 1.7–7)
NEUTROPHILS NFR BLD AUTO: 68.1 % (ref 42.7–76)
PLATELET # BLD AUTO: 207 10*3/MM3 (ref 140–450)
PMV BLD AUTO: 9.3 FL (ref 6–12)
RBC # BLD AUTO: 4.69 10*6/MM3 (ref 3.77–5.28)
WBC NRBC COR # BLD AUTO: 8.2 10*3/MM3 (ref 3.4–10.8)

## 2025-02-21 PROCEDURE — 96402 CHEMO HORMON ANTINEOPL SQ/IM: CPT

## 2025-02-21 PROCEDURE — 25010000002 LEUPROLIDE ACETATE (3 MONTH) PER 3.75 MG: Performed by: INTERNAL MEDICINE

## 2025-02-21 PROCEDURE — 99213 OFFICE O/P EST LOW 20 MIN: CPT | Performed by: INTERNAL MEDICINE

## 2025-02-21 PROCEDURE — 85025 COMPLETE CBC W/AUTO DIFF WBC: CPT | Performed by: INTERNAL MEDICINE

## 2025-02-21 PROCEDURE — 36415 COLL VENOUS BLD VENIPUNCTURE: CPT

## 2025-02-21 RX ADMIN — LEUPROLIDE ACETATE 11.25 MG: KIT at 14:16

## 2025-02-21 NOTE — PROGRESS NOTES
Pt came from seeing dr. Chew for lupron injection. Injection given and tolerated well. Pt discharged.

## 2025-05-13 NOTE — PROGRESS NOTES
HEMATOLOGY ONCOLOGY OUTPATIENT FOLLOW-UP       Patient name: Stacy Grewal  : 1980  MRN: [id number]  Primary Care Physician: Brenda Sheikh MD  Referring Physician: No ref. provider found  Reason For Consult: Breast cancer    History of Present Illness:      2023: Ms. grewal came to this office seeking attention for the first time on this date.She had been under my care in [place], [place].  In 2020 she had noted an area of discoloration above the nipple on the left breast.  Immediately underneath she had a hard nodule.  Over time it did not seem to increase in size and was not associated to any other symptoms.  For this reason eventually she underwent a screening mammogram that reported a 3 cm high density, spiculated tumor at the 11 to 12 o'clock position.  It was associated to pleomorphic microcalcifications and prominent architectural distortion.  An ultrasound confirmed a 3.2 x 2.3 x 1 point centimeter tumor.  A stereotactic biopsy took place on 2020.  He reported a mammary carcinoma of no special type.  It was great to 2 3.  Estrogen receptors were moderately positive in 60% of the cells.  The progesterone receptors were as well strongly positive in 10% of the cells.  HER2 was negative Ki-67 was positive in 20% of the cells.  She had been seen by Dr. Sunni Barber who requested neoadjuvant chemotherapy both because of the location but also because of the size of the tumor.  She received a port.  She started treatment with AC on a dose-dense fashion, with growth factor support.  She attempted scalp cooling that she did not tolerate and she ended up with universal alopecia.  After 4 cycles of this combination she was started on paclitaxel which she received on an every 3-week schedule.  There was evidence of response with reduction in the size of the tumor.  She was taken to the operating room.  On 2020 she underwent an ultrasound-guided  lumpectomy with sentinel lymph node biopsy.  Residual invasive mammary carcinoma of no special type with apocrine features was present.  It measured 3 cm and was high-grade (3, 3, 2).  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  Of the met 2 sentinel lymph nodes submitted, neither had invasive disease.  All margins were negative.  The disease was staged as [id number].  She started ovarian suppression with leuprolide and hormonal manipulation with anastrozole.  At the time of the last visit with me she was tolerating the treatment reasonably well.    She then was seen by Dr. Prakash Escobedo and who suggested that the sertraline that she had been taking was not compatible with her treatments and switch to a different antidepressant.  This resulted in anxiety that was treated with temazepam.  A few days later she went through a difficult divorce and on 1 occasion took several tablets of temazepam.  This resulted in an admission to the hospital where she spent at least 3 days unresponsive.  Eventually she recovered.  She was placed back on the sertraline by her psychiatrist.    On the day of this visit she had several complaints.  She had become forgetful and had to write everything down.  She had a persistent headache and frequent smell of cigarette smoke even when there was none present that had been going on for several months without worsening.  She also complained of back pain with radiation to the left lower extremity along the posterior aspect of the thigh.  She had become nearly incontinent of both stool and urine but she had no melena or hematochezia.  She had continued to gain weight in spite of her efforts but he had not been able to exercise or change her diet pattern.  On exam there were no palpable lymph nodes.  The lungs were clear.  The heart regular.  Abdomen rounded, protuberant and soft.  Liver and spleen not enlarged and a tumor could not be identified.  There was no edema.  A decision was made to  continue with the same antineoplastic therapy.  She was also instructed to continue with what ever antidepressant her psychiatrist was prescribing as there was no formal contraindication of sertraline generally with any of the other SSRIs with the treatment she was receiving.  She was referred to gynecology for a Pap smear (she had not had one in more than 5 years) and for pelvic exam.  She was referred to social work to see if she could find health insurance through the health insurance exchange.    8/7/2023: Feeling somewhat better. The physical therapy has helped some. She has been eating well and without unintended weight loss. No fevers. Active and working full time. Tolerating the medication well. On exam no changes. The laboratory exams were reviewed and discussed with her. To continue with the same treatment. She is to see me in 2 months.     10/30/2023: Has not been feeling particularly well.  Has had a persistent headache that is not unusual for her but has been long-lasting.  She also has noted a small nodule on her scalp to increase in size.  She is eating well and her weight continues to go up.  She has had no chest pains and no more dyspnea than usual.  No abdominal pain.  She has maintained regular bowel activity and she has no dysuria.  No skin rash.  On exam no changes.  She does have a small nodule on the skin of the scalp on the right parietal region, a few centimeters posterior to the frontal region.  There are no associated skin changes and it is hard to palpation.  It does not appear to be a malignant deposit but I am not entirely sure.  The lungs are clear.  The heart is regular.  The abdomen is protuberant, soft and without hepatomegaly or splenomegaly.  There is no edema.  There is no skin rash.  I offered her referral to surgery for sampling of the subcutaneous nodule on the scalp though I am not convinced that it is malignant.  She is leery of that and anxious with procedures.  We will  postpone and see again the next time.  Her liver enzymes have persistently been elevated and I have asked her to allow me to do a CT of the abdomen to evaluate her liver.  I suspect steatohepatitis.  Discussed with her.    12/14/2023: Feeling about the same.  Frequently tired and frequently with headache but able to perform all her duties.  She sleeps poorly but not any different than usual.  She continues to eat reasonably well and has had no nausea, vomiting or unintended weight loss.  She has been free of pain.  On exam she is alert, conversant and in no distress.  She is well-oriented.  The lungs are clear.  The heart is regular.  The abdomen is protuberant but soft and without hepatomegaly or splenomegaly.  The laboratory exams were reviewed and discussed with her.  I reviewed independently the images of her scans and discussed with her.  No suggestion of metastatic disease.  She continues to tolerate the treatment with ovarian suppression and an aromatase inhibitor well.  Her last estradiol, in June of this year was suppressed.  Will continue to monitor.     7/12/2024: Feeling reasonably well and continues to work full time. Eating well and without unintended weight loss. No chest pain or cough. No abdominal pain. Complains of feelings of cold feet. No claudication. On exam alert and conversant. No jaundice. No oral lesions and respirations not labored. Lungs clear and heart regular. Abdomen soft. No edema. Adequate pedal and posterior tibialis pulses. Laboratory exams reviewed. Has subclinical hypothyroidism. Discussed with her.     11/22/2024: Feels reasonably well.  Again complains of pain in the lower back that radiates down and the lower extremity on the left.  This is particularly prominent if she is very active physically.  She works full-time and has no new limitations.  She eats well and has not lost weight.  She has been without increasing dyspnea or cough.  No diarrhea or dysuria.  On exam she is  alert and conversant.  Oriented and in no distress.  No jaundice.  The lungs are clear bilaterally and the heart regular.  There is no palpable adenopathy in the supraclavicular or axillary spaces.  Abdomen is soft.  No palpable tumors.  No edema.  Laboratory exams reviewed.  Discussed with her.  No suggestion of recurrent disease however, her liver enzymes at the time of the last visit were elevated.  I will check them today.  I have also requested a bone scan to make sure that her complaints are not related to a significant problem though on the last scan of the abdomen and pelvis no significant abnormalities were identified.    1/24/2025: Persist with back pain in the same location as before.  Lately, in addition, she has had some headaches.  No associated symptoms but at least once this was related to nausea and vomiting.  She is as active as usual and continues to work without limitations.  She continues to eat well and her weight has been stable.  No chest pains or cough.  No abdominal pain.  No change in bowel activity.  No dysuria.  On exam ill-appearing.  No distress.  No jaundice.  Lungs clear bilaterally and heart regular.  Abdomen protuberant.  I cannot feel any tumors.  Percussion of the vertebral bodies of the lower thoracic and lumbar spine does not result in pain.  Pressure on the left sacroiliac joint results in some relief.  No edema.  Laboratory exams reviewed.  Reviewed the images and the report of the bone scan.  L4 shows an area of increased tracer uptake.  Will investigate further with MRI and will obtain a MRI of the brain to investigate her headaches.  Discussed with her.  She will see me with the results.    2/21/2025: Feels well today.  Has not had much back pain.  Also no headaches.  She reported a respiratory infection that resulted in persistent cough that is present even today but she has no dyspnea.  No chest pains.  No abdominal pain or diarrhea.  On exam alert, conversant and  oriented.  No distress.  No jaundice.  Lungs clear bilaterally.  Heart regular.  Abdomen soft.  No edema.  Laboratory exams reviewed.  Reviewed the images and the report of the scans.  There is no suggestion of metastatic disease.  Continue same treatment.  See me again in 3 months.    5/16/2025: Continues to tolerate treatment well.  She is about to make 5 years from the initiation of ovarian suppression.  She is generally feeling well.  She is active and energetic.  She is recovering from a prolonged respiratory infection.  She has had no nausea or vomiting.  No unintended weight loss.  Denies chest pains and has not been coughing.  No abdominal pain or diarrhea.  No dysuria.  No edema.  Exam alert and conversant.  Oriented.  No distress.  No jaundice.  Lungs clear bilaterally and heart regular.  Abdomen soft.  No edema.  Laboratory exams reviewed and discussed with her.  Continue same treatment for now.  See me in approximately 4 months.    Past Medical History:   Diagnosis Date    Anemia     Anxiety     Balance problem     Breast cancer     Left    Depression     Drug therapy     History of chemotherapy     7/22/2020. SEES DR ATKINS IN [place]    History of migraine     History of neuropathy     IN FEET    History of UTI      Past Surgical History:   Procedure Laterality Date    APPENDECTOMY      BREAST BIOPSY Left     MARCH 2020. TREATMENT WITH CHEMO.COMPLETED IN JULY 2020    BREAST LUMPECTOMY WITH SENTINEL NODE BIOPSY Left 8/26/2020    Procedure: Left breast ultrasound-guided lumpectomy, left axillary sentinel lymph node biopsy;  Surgeon: Sunni Barber MD;  Location: [facility];  Service: General;  Laterality: Left;    BREAST SURGERY Bilateral 8/26/2020    Procedure: LEFT BREAST ONCOPLASTIC CLOSURE RIGHT BREAST REDUCTION FOR SYMMETERY;  Surgeon: Angela Davidson MD;  Location: [facility];  Service: Plastics;  Laterality: Bilateral;    VENOUS ACCESS DEVICE (PORT) INSERTION Right 4/7/2020     Procedure: RIGHT port placement.;  Surgeon: Sunni Barber MD;  Location: [facility];  Service: General;  Laterality: Right;       Current Outpatient Medications:     anastrozole (ARIMIDEX) 1 MG tablet, Take 1 tablet by mouth once daily, Disp: 90 tablet, Rfl: 2    atomoxetine (STRATTERA) 40 MG capsule, Take 1 capsule by mouth Daily., Disp: , Rfl:     sertraline (ZOLOFT) 100 MG tablet, Take 1 tablet by mouth 2 (Two) Times a Day., Disp: , Rfl:   No current facility-administered medications for this visit.    Facility-Administered Medications Ordered in Other Visits:     leuprolide (LUPRON) injection 11.25 mg, 11.25 mg, Intramuscular, Once, Good Chew MD    No Known Allergies    Family History   Problem Relation Age of Onset    Breast cancer Paternal Aunt         GREAT PATERNAL AUNT     Breast cancer Cousin 35        PATERNAL COUSIN     Malig Hyperthermia Neg Hx      Cancer-related family history includes Breast cancer in her paternal aunt; Breast cancer (age of onset: 35) in her cousin.    Social History     Tobacco Use    Smoking status: Never    Smokeless tobacco: Never   Vaping Use    Vaping status: Never Used   Substance Use Topics    Alcohol use: Yes     Comment: Occasional    Drug use: Never     Social History     Social History Narrative    Not on file     ROS:   Review of Systems   Constitutional:  Positive for activity change and fatigue. Negative for appetite change, chills, diaphoresis, fever and unexpected weight change.   HENT:  Negative for congestion, dental problem, drooling, ear discharge, ear pain, facial swelling, hearing loss, mouth sores, nosebleeds, postnasal drip, rhinorrhea, sinus pressure, sinus pain, sneezing, sore throat, tinnitus, trouble swallowing and voice change.    Eyes:  Negative for photophobia, pain, discharge, redness, itching and visual disturbance.   Respiratory:  Negative for apnea, cough, choking, chest tightness, shortness of breath, wheezing and stridor.   "  Cardiovascular:  Negative for chest pain, palpitations and leg swelling.   Gastrointestinal:  Negative for abdominal distention, abdominal pain, anal bleeding, blood in stool, constipation, diarrhea, nausea, rectal pain and vomiting.   Endocrine: Negative for cold intolerance, heat intolerance, polydipsia and polyuria.   Genitourinary:  Negative for decreased urine volume, difficulty urinating, dysuria, flank pain, frequency, genital sores, hematuria and urgency.   Musculoskeletal:  Positive for back pain. Negative for arthralgias, gait problem, joint swelling, myalgias, neck pain and neck stiffness.   Skin:  Negative for color change, pallor and rash.   Neurological:  Positive for headaches. Negative for dizziness, tremors, seizures, syncope, facial asymmetry, speech difficulty, weakness, light-headedness and numbness.   Hematological:  Negative for adenopathy. Does not bruise/bleed easily.   Psychiatric/Behavioral:  Negative for agitation, behavioral problems, confusion, decreased concentration, hallucinations, self-injury, sleep disturbance and suicidal ideas. The patient is not nervous/anxious.      Objective:    Vital Signs:  Vitals:    05/16/25 1320   BP: 129/83   Pulse: 99   Resp: 14   Temp: 98.4 °F (36.9 °C)   SpO2: 98%   Weight: 91 kg (200 lb 9.6 oz)   Height: 160 cm (63\")   PainSc: 0-No pain     Body mass index is 35.53 kg/m².    ECOG  (0) Fully active, able to carry on all predisease performance without restriction    Physical Exam:   Physical Exam  Constitutional:       General: She is not in acute distress.     Appearance: She is ill-appearing. She is not toxic-appearing or diaphoretic.      Comments: Well-built, well oriented and in no distress.  Conversant and in good spirits.  BMI greater than 37 kg/m².   HENT:      Head: Normocephalic and atraumatic.      Right Ear: External ear normal.      Left Ear: External ear normal.      Nose: Nose normal.      Mouth/Throat:      Mouth: Mucous membranes are " moist.      Pharynx: Oropharynx is clear. No oropharyngeal exudate or posterior oropharyngeal erythema.   Eyes:      General: No scleral icterus.        Right eye: No discharge.         Left eye: No discharge.      Conjunctiva/sclera: Conjunctivae normal.      Pupils: Pupils are equal, round, and reactive to light.   Cardiovascular:      Rate and Rhythm: Normal rate and regular rhythm.      Pulses: Normal pulses.      Heart sounds: No murmur heard.     No friction rub. No gallop.   Pulmonary:      Effort: No respiratory distress.      Breath sounds: No stridor. No wheezing, rhonchi or rales.   Abdominal:      General: Bowel sounds are normal. There is no distension.      Palpations: Abdomen is soft. There is no mass.      Tenderness: There is no abdominal tenderness. There is no right CVA tenderness, left CVA tenderness, guarding or rebound.      Hernia: No hernia is present.      Comments: Rounded, protuberant, soft and nontender.  Liver and spleen do not appear enlarged.  No tumors are present.   Musculoskeletal:         General: No tenderness, deformity or signs of injury.      Cervical back: No rigidity.      Right lower leg: No edema.      Left lower leg: No edema.   Lymphadenopathy:      Cervical: No cervical adenopathy.   Skin:     Coloration: Skin is not jaundiced or pale.      Findings: No bruising, lesion or rash.   Neurological:      General: No focal deficit present.      Mental Status: She is alert and oriented to person, place, and time.      Cranial Nerves: No cranial nerve deficit.   Psychiatric:         Mood and Affect: Mood normal.         Behavior: Behavior normal.         Thought Content: Thought content normal.         Judgment: Judgment normal.     PARMINDER RODRIGUEZ performed the physical exam on 5/16/2025 as documented above.    Lab Results   Component Value Date    GLUCOSE 72 01/24/2025    BUN 8 01/24/2025    CREATININE 0.77 01/24/2025    EGFRIFNONA 84 08/24/2020    BCR 10.4 01/24/2025    K 3.9  01/24/2025    CO2 28.6 01/24/2025    CALCIUM 10.0 01/24/2025    ALBUMIN 4.8 01/24/2025    LABIL2 1.3 07/22/2020    AST 39 (H) 01/24/2025    ALT 34 (H) 01/24/2025     Assessment & Plan     1.  ypT2 N0 M0 estrogen receptor and progesterone receptor positive, HER2 negative moderately differentiated invasive carcinoma of the left breast.  Underwent neoadjuvant chemotherapy with AC, followed by paclitaxel.  Had a partial response to treatment and this was followed by lumpectomy and sentinel lymph node biopsy, radiation therapy and in late 2020 started ovarian suppression and hormonal manipulation with anastrozole.  Continues to tolerate adequately.  Will continue to complete 5 years.  2.  Fatigue/memory difficulties: Continues to work full-time.  3.  Back pain persists but has been less intense.  4.  Abnormal liver enzymes: Persist.  No progression.  Continue to follow.  Chemistry today.  5.  Reviewed all the recent laboratory exams and discussed with her.  6.  She will see me again in approximately 3 months.    Good Chew MD on 5/16/2025 at 1355.

## 2025-05-16 ENCOUNTER — LAB (OUTPATIENT)
Dept: LAB | Facility: HOSPITAL | Age: 45
End: 2025-05-16
Payer: COMMERCIAL

## 2025-05-16 ENCOUNTER — HOSPITAL ENCOUNTER (OUTPATIENT)
Dept: ONCOLOGY | Facility: HOSPITAL | Age: 45
Discharge: HOME | End: 2025-05-16
Payer: COMMERCIAL

## 2025-05-16 ENCOUNTER — OFFICE VISIT (OUTPATIENT)
Dept: ONCOLOGY | Facility: CLINIC | Age: 45
End: 2025-05-16
Payer: COMMERCIAL

## 2025-05-16 VITALS
RESPIRATION RATE: 14 BRPM | OXYGEN SATURATION: 98 % | SYSTOLIC BLOOD PRESSURE: 129 MMHG | HEART RATE: 99 BPM | DIASTOLIC BLOOD PRESSURE: 83 MMHG | WEIGHT: 200.6 LBS | TEMPERATURE: 98.4 F | HEIGHT: 63 IN | BODY MASS INDEX: 35.54 KG/M2

## 2025-05-16 DIAGNOSIS — E53.8 B12 DEFICIENCY: ICD-10-CM

## 2025-05-16 DIAGNOSIS — C50.212 CARCINOMA OF UPPER-INNER QUADRANT OF LEFT BREAST IN FEMALE, ESTROGEN RECEPTOR POSITIVE: Primary | ICD-10-CM

## 2025-05-16 DIAGNOSIS — E05.00 TOXIC DIFFUSE GOITER WITH PRETIBIAL MYXEDEMA: ICD-10-CM

## 2025-05-16 DIAGNOSIS — E03.8 SUBCLINICAL HYPOTHYROIDISM: ICD-10-CM

## 2025-05-16 DIAGNOSIS — N95.9 PREMENOPAUSAL PATIENT: ICD-10-CM

## 2025-05-16 DIAGNOSIS — R74.8 ACID PHOSPHATASE ELEVATED: ICD-10-CM

## 2025-05-16 DIAGNOSIS — R74.8 ELEVATED ALKALINE PHOSPHATASE LEVEL: ICD-10-CM

## 2025-05-16 DIAGNOSIS — Z17.0 CARCINOMA OF UPPER-INNER QUADRANT OF LEFT BREAST IN FEMALE, ESTROGEN RECEPTOR POSITIVE: ICD-10-CM

## 2025-05-16 DIAGNOSIS — Z17.0 CARCINOMA OF UPPER-INNER QUADRANT OF LEFT BREAST IN FEMALE, ESTROGEN RECEPTOR POSITIVE: Primary | ICD-10-CM

## 2025-05-16 DIAGNOSIS — E03.8 TOXIC DIFFUSE GOITER WITH PRETIBIAL MYXEDEMA: ICD-10-CM

## 2025-05-16 DIAGNOSIS — C50.212 CARCINOMA OF UPPER-INNER QUADRANT OF LEFT BREAST IN FEMALE, ESTROGEN RECEPTOR POSITIVE: ICD-10-CM

## 2025-05-16 DIAGNOSIS — E53.8 BIOTIN-(PROPIONYL-COA-CARBOXYLASE) LIGASE DEFICIENCY: ICD-10-CM

## 2025-05-16 DIAGNOSIS — N95.9 PREMENOPAUSAL PATIENT: Primary | ICD-10-CM

## 2025-05-16 LAB
25(OH)D3 SERPL-MCNC: 40.4 NG/ML (ref 30–100)
ALBUMIN SERPL-MCNC: 4.6 G/DL (ref 3.5–5.2)
ALBUMIN/GLOB SERPL: 1.7 G/DL
ALP SERPL-CCNC: 140 U/L (ref 39–117)
ALT SERPL W P-5'-P-CCNC: 31 U/L (ref 1–33)
ANION GAP SERPL CALCULATED.3IONS-SCNC: 14 MMOL/L (ref 5–15)
AST SERPL-CCNC: 34 U/L (ref 1–32)
BASOPHILS # BLD AUTO: 0.02 10*3/MM3 (ref 0–0.2)
BASOPHILS NFR BLD AUTO: 0.3 % (ref 0–1.5)
BILIRUB SERPL-MCNC: 0.3 MG/DL (ref 0–1.2)
BUN SERPL-MCNC: 6 MG/DL (ref 6–20)
BUN/CREAT SERPL: 7.5 (ref 7–25)
CALCIUM SPEC-SCNC: 9.9 MG/DL (ref 8.6–10.5)
CHLORIDE SERPL-SCNC: 100 MMOL/L (ref 98–107)
CO2 SERPL-SCNC: 27 MMOL/L (ref 22–29)
CREAT SERPL-MCNC: 0.8 MG/DL (ref 0.57–1)
DEPRECATED RDW RBC AUTO: 41.9 FL (ref 37–54)
EGFRCR SERPLBLD CKD-EPI 2021: 93.9 ML/MIN/1.73
EOSINOPHIL # BLD AUTO: 0.15 10*3/MM3 (ref 0–0.4)
EOSINOPHIL NFR BLD AUTO: 1.9 % (ref 0.3–6.2)
ERYTHROCYTE [DISTWIDTH] IN BLOOD BY AUTOMATED COUNT: 13.6 % (ref 12.3–15.4)
GGT SERPL-CCNC: 55 U/L (ref 5–36)
GLOBULIN UR ELPH-MCNC: 2.7 GM/DL
GLUCOSE SERPL-MCNC: 74 MG/DL (ref 65–99)
HCT VFR BLD AUTO: 40 % (ref 34–46.6)
HGB BLD-MCNC: 13.5 G/DL (ref 12–15.9)
HOLD SPECIMEN: NORMAL
LYMPHOCYTES # BLD AUTO: 2.6 10*3/MM3 (ref 0.7–3.1)
LYMPHOCYTES NFR BLD AUTO: 33.1 % (ref 19.6–45.3)
MCH RBC QN AUTO: 29 PG (ref 26.6–33)
MCHC RBC AUTO-ENTMCNC: 33.8 G/DL (ref 31.5–35.7)
MCV RBC AUTO: 86 FL (ref 79–97)
MONOCYTES # BLD AUTO: 0.53 10*3/MM3 (ref 0.1–0.9)
MONOCYTES NFR BLD AUTO: 6.7 % (ref 5–12)
NEUTROPHILS NFR BLD AUTO: 4.56 10*3/MM3 (ref 1.7–7)
NEUTROPHILS NFR BLD AUTO: 58 % (ref 42.7–76)
PLATELET # BLD AUTO: 205 10*3/MM3 (ref 140–450)
PMV BLD AUTO: 9.2 FL (ref 6–12)
POTASSIUM SERPL-SCNC: 3.9 MMOL/L (ref 3.5–5.2)
PROT SERPL-MCNC: 7.3 G/DL (ref 6–8.5)
RBC # BLD AUTO: 4.65 10*6/MM3 (ref 3.77–5.28)
SODIUM SERPL-SCNC: 141 MMOL/L (ref 136–145)
T4 FREE SERPL-MCNC: 0.95 NG/DL (ref 0.92–1.68)
TSH SERPL DL<=0.05 MIU/L-ACNC: 5.2 UIU/ML (ref 0.27–4.2)
VIT B12 BLD-MCNC: 349 PG/ML (ref 211–946)
WBC NRBC COR # BLD AUTO: 7.86 10*3/MM3 (ref 3.4–10.8)

## 2025-05-16 PROCEDURE — 36415 COLL VENOUS BLD VENIPUNCTURE: CPT

## 2025-05-16 PROCEDURE — 82306 VITAMIN D 25 HYDROXY: CPT | Performed by: FAMILY MEDICINE

## 2025-05-16 PROCEDURE — 99213 OFFICE O/P EST LOW 20 MIN: CPT | Performed by: INTERNAL MEDICINE

## 2025-05-16 PROCEDURE — 84443 ASSAY THYROID STIM HORMONE: CPT | Performed by: FAMILY MEDICINE

## 2025-05-16 PROCEDURE — 96402 CHEMO HORMON ANTINEOPL SQ/IM: CPT

## 2025-05-16 PROCEDURE — 25010000002 LEUPROLIDE ACETATE (3 MONTH) PER 3.75 MG: Performed by: INTERNAL MEDICINE

## 2025-05-16 PROCEDURE — 82977 ASSAY OF GGT: CPT | Performed by: FAMILY MEDICINE

## 2025-05-16 PROCEDURE — 82607 VITAMIN B-12: CPT | Performed by: FAMILY MEDICINE

## 2025-05-16 PROCEDURE — 80053 COMPREHEN METABOLIC PANEL: CPT | Performed by: INTERNAL MEDICINE

## 2025-05-16 PROCEDURE — 84439 ASSAY OF FREE THYROXINE: CPT | Performed by: FAMILY MEDICINE

## 2025-05-16 PROCEDURE — 85025 COMPLETE CBC W/AUTO DIFF WBC: CPT

## 2025-05-16 RX ADMIN — LEUPROLIDE ACETATE 11.25 MG: KIT at 14:09

## 2025-05-16 NOTE — PROGRESS NOTES
Pt came from seeing dr. Chew for lupron injection. Injection given and tolerated well. Clinic printed AVS for patient

## (undated) DEVICE — PK UNIV COMPL 40

## (undated) DEVICE — SUT MNCRYL 4/0 PS2 18 IN

## (undated) DEVICE — CVR PROB GEN PURP W ISOSILK 6X48

## (undated) DEVICE — 3M™ STERI-STRIP™ REINFORCED ADHESIVE SKIN CLOSURES, R1547, 1/2 IN X 4 IN (12 MM X 100 MM), 6 STRIPS/ENVELOPE: Brand: 3M™ STERI-STRIP™

## (undated) DEVICE — SKIN PREP TRAY W/CHG: Brand: MEDLINE INDUSTRIES, INC.

## (undated) DEVICE — SUT SILK 3/0 TIES 18IN A184H

## (undated) DEVICE — DRP C/ARM 41X74IN

## (undated) DEVICE — Device

## (undated) DEVICE — BIOPATCH™ ANTIMICROBIAL DRESSING WITH CHLORHEXIDINE GLUCONATE IS A HYDROPHILLIC POLYURETHANE ABSORPTIVE FOAM WITH CHLORHEXIDINE GLUCONATE (CHG) WHICH INHIBITS BACTERIAL GROWTH UNDER THE DRESSING. THE DRESSING IS INTENDED TO BE USED TO ABSORB EXUDATE, COVER A WOUND CAUSED BY VASCULAR AND NONVASCULAR PERCUTANEOUS MEDICAL DEVICES DURING SURGERY, AS WELL AS REDUCE LOCAL INFECTION AND COLONIZATION OF MICROORGANISMS.: Brand: BIOPATCH

## (undated) DEVICE — SUT MNCRYL PLS ANTIB UD 4/0 PS2 18IN

## (undated) DEVICE — DRSNG WND BORDR/ADHS NONADHR/GZ LF 4X4IN STRL

## (undated) DEVICE — STPLR SKIN VISISTAT WD 35CT

## (undated) DEVICE — EXTRCT STPL SKIN STRL BX/12

## (undated) DEVICE — PK CHST BRST 40

## (undated) DEVICE — TOWEL,OR,DSP,ST,BLUE,STD,4/PK,20PK/CS: Brand: MEDLINE

## (undated) DEVICE — TRAP FLD MINIVAC MEGADYNE 100ML

## (undated) DEVICE — GLV SURG BIOGEL M LTX PF 6 1/2

## (undated) DEVICE — TUBING, SUCTION, 1/4" X 20', STRAIGHT: Brand: MEDLINE INDUSTRIES, INC.

## (undated) DEVICE — INTENDED FOR TISSUE SEPARATION, AND OTHER PROCEDURES THAT REQUIRE A SHARP SURGICAL BLADE TO PUNCTURE OR CUT.: Brand: BARD-PARKER ® CARBON RIB-BACK BLADES

## (undated) DEVICE — PK MINOR PROCEDURE 46

## (undated) DEVICE — PROXIMATE RH ROTATING HEAD SKIN STAPLERS (35 WIDE) CONTAINS 35 STAINLESS STEEL STAPLES: Brand: PROXIMATE

## (undated) DEVICE — ANTIBACTERIAL UNDYED BRAIDED (POLYGLACTIN 910), SYNTHETIC ABSORBABLE SUTURE: Brand: COATED VICRYL

## (undated) DEVICE — SUT MNCRYL 3/0 PS2 18IN MCP497G

## (undated) DEVICE — BNDG ELAS ELITE V/CLOSE 6IN 5YD LF STRL

## (undated) DEVICE — JACKSON-PRATT 100CC BULB RESERVOIR: Brand: CARDINAL HEALTH

## (undated) DEVICE — MARKR SKIN W/RULR AND LBL

## (undated) DEVICE — PAD,ABDOMINAL,8"X10",ST,LF: Brand: MEDLINE

## (undated) DEVICE — SOL NACL 0.9PCT 100ML SGL

## (undated) DEVICE — TOTAL TRAY, 16FR 10ML SIL FOLEY, URN: Brand: MEDLINE

## (undated) DEVICE — CVR TRANSD CIV FLX TPR 11.9 TO 3.8X61CM

## (undated) DEVICE — DECANT BG O JET

## (undated) DEVICE — CONTAINER,SPECIMEN,OR STERILE,4OZ: Brand: MEDLINE

## (undated) DEVICE — SUT ETHLN 4/0 PS2 PLSTC 1667G

## (undated) DEVICE — IRRIGATOR BULB ASEPTO 60CC STRL

## (undated) DEVICE — GLV SURG BIOGEL LTX PF 7

## (undated) DEVICE — SUT VIC 3/0 TIES 18IN J110T

## (undated) DEVICE — DRSNG SURESITE WNDW 4X4.5

## (undated) DEVICE — NDL FLTR 19G 1 1/2 LF

## (undated) DEVICE — VIOLET BRAIDED (POLYGLACTIN 910), SYNTHETIC ABSORBABLE SUTURE: Brand: COATED VICRYL

## (undated) DEVICE — ELECTRD BLD EDGE/INSUL1P 2.4X5.1MM STRL

## (undated) DEVICE — GLV SURG SIGNATURE ESSENTIAL PF LTX SZ6.5

## (undated) DEVICE — PENCL E/S ULTRAVAC TELESCP NOSE HOLSTR 10FT

## (undated) DEVICE — SUT PROLN 2/0 CT2 30IN 8411H